# Patient Record
Sex: FEMALE | Race: WHITE
[De-identification: names, ages, dates, MRNs, and addresses within clinical notes are randomized per-mention and may not be internally consistent; named-entity substitution may affect disease eponyms.]

---

## 2020-01-14 ENCOUNTER — HOSPITAL ENCOUNTER (OUTPATIENT)
Dept: HOSPITAL 62 - OD | Age: 65
End: 2020-01-14
Attending: FAMILY MEDICINE
Payer: COMMERCIAL

## 2020-01-14 DIAGNOSIS — M79.673: Primary | ICD-10-CM

## 2020-01-14 DIAGNOSIS — Z79.899: ICD-10-CM

## 2020-01-14 LAB
ANION GAP SERPL CALC-SCNC: 9 MMOL/L (ref 5–19)
BUN SERPL-MCNC: 27 MG/DL (ref 7–20)
CALCIUM: 10.1 MG/DL (ref 8.4–10.2)
CHLORIDE SERPL-SCNC: 97 MMOL/L (ref 98–107)
CO2 SERPL-SCNC: 29 MMOL/L (ref 22–30)
GLUCOSE SERPL-MCNC: 86 MG/DL (ref 75–110)
POTASSIUM SERPL-SCNC: 4.5 MMOL/L (ref 3.6–5)

## 2020-01-14 PROCEDURE — 36415 COLL VENOUS BLD VENIPUNCTURE: CPT

## 2020-01-14 PROCEDURE — 80048 BASIC METABOLIC PNL TOTAL CA: CPT

## 2020-01-14 PROCEDURE — 84550 ASSAY OF BLOOD/URIC ACID: CPT

## 2020-05-19 ENCOUNTER — HOSPITAL ENCOUNTER (OUTPATIENT)
Dept: HOSPITAL 62 - OD | Age: 65
End: 2020-05-19
Attending: FAMILY MEDICINE
Payer: COMMERCIAL

## 2020-05-19 DIAGNOSIS — J02.9: Primary | ICD-10-CM

## 2020-05-19 DIAGNOSIS — Z79.899: ICD-10-CM

## 2020-05-19 PROCEDURE — 87880 STREP A ASSAY W/OPTIC: CPT

## 2020-05-19 PROCEDURE — 87070 CULTURE OTHR SPECIMN AEROBIC: CPT

## 2020-06-19 ENCOUNTER — HOSPITAL ENCOUNTER (OUTPATIENT)
Dept: HOSPITAL 62 - WI | Age: 65
End: 2020-06-19
Attending: FAMILY MEDICINE
Payer: COMMERCIAL

## 2020-06-19 DIAGNOSIS — Z12.31: Primary | ICD-10-CM

## 2020-06-19 PROCEDURE — 77067 SCR MAMMO BI INCL CAD: CPT

## 2020-06-19 NOTE — WOMENS IMAGING REPORT
EXAM DESCRIPTION:  BILAT SCREENING MAMMO W/CAD



IMAGES COMPLETED DATE/TIME:  6/19/2020 10:47 am



REASON FOR STUDY:  Z12.31 SCREENING MAMMO Z12.31  ENCNTR SCREEN MAMMOGRAM FOR MALIGNANT NEOPLASM OF B
RE



COMPARISON:  None.



EXAM PARAMETERS:  Standard craniocaudal and mediolateral oblique views of each breast recorded using 
digital acquisition.

Read with the assistance of CAD.

.Formerly [facility]  Version 9.2



LIMITATIONS:  None.



FINDINGS:  No suspicious masses, suspicious calcifications or architectural distortion. No areas of c
oncern.



IMPRESSION:  NEGATIVE MAMMOGRAM.  BIRADS 1



BREAST DENSITY:  d. The breasts are extremely dense, which lowers the sensitivity of mammography.



BIRAD:  ASSESSMENT:  1 NEGATIVE



RECOMMENDATION:  ROUTINE SCREENING



COMMENT:  The patient has been notified of the results by letter per MQSA requirements. Additional no
tification policies are in place for contacting patient with suspicious or incomplete findings.

Quality ID #225: The American College of Radiology recommends an annual screening mammogram for women
 aged 40 years or over. This facility utilizes a reminder system to ensure that all patients receive 
reminder letters, and/or direct phone calls for appointments. This includes reminders for routine scr
eening mammograms, diagnostic mammograms, or other Breast Imaging Interventions when appropriate.  Th
is patient will be placed in the appropriate reminder system.



TECHNICAL DOCUMENTATION:  FINDING NUMBER: (1)

ASSESSMENT: (1)

JOB ID:  [id number]

 2011 [employer]- All Rights Reserved



Reading location - IP/workstation name: [email or web address]

## 2020-07-27 ENCOUNTER — HOSPITAL ENCOUNTER (INPATIENT)
Dept: HOSPITAL 62 - ER | Age: 65
LOS: 8 days | Discharge: HOME | DRG: 178 | End: 2020-08-04
Attending: INTERNAL MEDICINE | Admitting: INTERNAL MEDICINE
Payer: COMMERCIAL

## 2020-07-27 DIAGNOSIS — E78.5: ICD-10-CM

## 2020-07-27 DIAGNOSIS — R05: ICD-10-CM

## 2020-07-27 DIAGNOSIS — E10.22: ICD-10-CM

## 2020-07-27 DIAGNOSIS — R74.0: ICD-10-CM

## 2020-07-27 DIAGNOSIS — N18.9: ICD-10-CM

## 2020-07-27 DIAGNOSIS — I48.0: ICD-10-CM

## 2020-07-27 DIAGNOSIS — Z87.891: ICD-10-CM

## 2020-07-27 DIAGNOSIS — Z79.82: ICD-10-CM

## 2020-07-27 DIAGNOSIS — Z82.49: ICD-10-CM

## 2020-07-27 DIAGNOSIS — Z79.4: ICD-10-CM

## 2020-07-27 DIAGNOSIS — R79.89: ICD-10-CM

## 2020-07-27 DIAGNOSIS — R50.9: ICD-10-CM

## 2020-07-27 DIAGNOSIS — Z83.3: ICD-10-CM

## 2020-07-27 DIAGNOSIS — Z88.2: ICD-10-CM

## 2020-07-27 DIAGNOSIS — U07.1: Primary | ICD-10-CM

## 2020-07-27 DIAGNOSIS — R06.02: ICD-10-CM

## 2020-07-27 DIAGNOSIS — Z90.49: ICD-10-CM

## 2020-07-27 DIAGNOSIS — F32.9: ICD-10-CM

## 2020-07-27 DIAGNOSIS — Z79.51: ICD-10-CM

## 2020-07-27 DIAGNOSIS — E87.1: ICD-10-CM

## 2020-07-27 DIAGNOSIS — R79.1: ICD-10-CM

## 2020-07-27 DIAGNOSIS — J44.9: ICD-10-CM

## 2020-07-27 DIAGNOSIS — Z79.890: ICD-10-CM

## 2020-07-27 DIAGNOSIS — Z95.5: ICD-10-CM

## 2020-07-27 DIAGNOSIS — I25.10: ICD-10-CM

## 2020-07-27 DIAGNOSIS — I12.9: ICD-10-CM

## 2020-07-27 DIAGNOSIS — E89.0: ICD-10-CM

## 2020-07-27 LAB
ABSOLUTE LYMPHOCYTES# (MANUAL): 0.5 10^3/UL (ref 0.5–4.7)
ABSOLUTE MONOCYTES # (MANUAL): 0.3 10^3/UL (ref 0.1–1.4)
ADD MANUAL DIFF: NO
ALBUMIN SERPL-MCNC: 4.4 G/DL (ref 3.5–5)
ALP SERPL-CCNC: 69 U/L (ref 38–126)
ANION GAP SERPL CALC-SCNC: 7 MMOL/L (ref 5–19)
ANISOCYTOSIS BLD QL SMEAR: SLIGHT
AST SERPL-CCNC: 84 U/L (ref 14–36)
BASOPHILS # BLD AUTO: 0 10^3/UL (ref 0–0.2)
BASOPHILS NFR BLD AUTO: 0.5 % (ref 0–2)
BASOPHILS NFR BLD MANUAL: 0 % (ref 0–2)
BILIRUB DIRECT SERPL-MCNC: 0 MG/DL (ref 0–0.4)
BILIRUB SERPL-MCNC: 0.6 MG/DL (ref 0.2–1.3)
BUN SERPL-MCNC: 14 MG/DL (ref 7–20)
BURR CELLS BLD QL SMEAR: SLIGHT
CALCIUM: 8.9 MG/DL (ref 8.4–10.2)
CHLORIDE SERPL-SCNC: 90 MMOL/L (ref 98–107)
CO2 SERPL-SCNC: 28 MMOL/L (ref 22–30)
CRP SERPL-MCNC: 87.1 MG/L (ref ?–10)
EOSINOPHIL # BLD AUTO: 0 10^3/UL (ref 0–0.6)
EOSINOPHIL NFR BLD AUTO: 0.9 % (ref 0–6)
EOSINOPHIL NFR BLD MANUAL: 0 % (ref 0–6)
ERYTHROCYTE [DISTWIDTH] IN BLOOD BY AUTOMATED COUNT: 14.5 % (ref 11.5–14)
ERYTHROCYTE [DISTWIDTH] IN BLOOD BY AUTOMATED COUNT: 14.9 % (ref 11.5–14)
FERRITIN SERPL-MCNC: 377 NG/ML (ref 11.1–264)
GLUCOSE SERPL-MCNC: 72 MG/DL (ref 75–110)
HCT VFR BLD CALC: 32.2 % (ref 36–47)
HCT VFR BLD CALC: 33.3 % (ref 36–47)
HGB BLD-MCNC: 11 G/DL (ref 12–15.5)
HGB BLD-MCNC: 11.2 G/DL (ref 12–15.5)
LYMPHOCYTES # BLD AUTO: 0.5 10^3/UL (ref 0.5–4.7)
LYMPHOCYTES NFR BLD AUTO: 9.4 % (ref 13–45)
MCH RBC QN AUTO: 28 PG (ref 27–33.4)
MCH RBC QN AUTO: 28.4 PG (ref 27–33.4)
MCHC RBC AUTO-ENTMCNC: 33.6 G/DL (ref 32–36)
MCHC RBC AUTO-ENTMCNC: 34.1 G/DL (ref 32–36)
MCV RBC AUTO: 83 FL (ref 80–97)
MCV RBC AUTO: 83 FL (ref 80–97)
MONOCYTES # BLD AUTO: 0.3 10^3/UL (ref 0.1–1.4)
MONOCYTES % (MANUAL): 5 % (ref 3–13)
MONOCYTES NFR BLD AUTO: 5 % (ref 3–13)
NEUTROPHILS # BLD AUTO: 4.6 10^3/UL (ref 1.7–8.2)
NEUTS SEG NFR BLD AUTO: 84.2 % (ref 42–78)
PLATELET # BLD: 141 10^3/UL (ref 150–450)
PLATELET # BLD: 146 10^3/UL (ref 150–450)
PLATELET COMMENT: (no result)
POTASSIUM SERPL-SCNC: 4.2 MMOL/L (ref 3.6–5)
PROT SERPL-MCNC: 7.9 G/DL (ref 6.3–8.2)
RBC # BLD AUTO: 3.88 10^6/UL (ref 3.72–5.28)
RBC # BLD AUTO: 3.99 10^6/UL (ref 3.72–5.28)
SEGMENTED NEUTROPHILS % (MAN): 86 % (ref 42–78)
TOTAL CELLS COUNTED % (AUTO): 100 %
TOTAL CELLS COUNTED BLD: 100
VARIANT LYMPHS NFR BLD MANUAL: 9 % (ref 13–45)
WBC # BLD AUTO: 5.4 10^3/UL (ref 4–10.5)
WBC # BLD AUTO: 5.8 10^3/UL (ref 4–10.5)
WBC TOXIC VACUOLES BLD QL SMEAR: PRESENT

## 2020-07-27 PROCEDURE — 87205 SMEAR GRAM STAIN: CPT

## 2020-07-27 PROCEDURE — 82962 GLUCOSE BLOOD TEST: CPT

## 2020-07-27 PROCEDURE — 96365 THER/PROPH/DIAG IV INF INIT: CPT

## 2020-07-27 PROCEDURE — 93306 TTE W/DOPPLER COMPLETE: CPT

## 2020-07-27 PROCEDURE — 84300 ASSAY OF URINE SODIUM: CPT

## 2020-07-27 PROCEDURE — 93010 ELECTROCARDIOGRAM REPORT: CPT

## 2020-07-27 PROCEDURE — 93312 ECHO TRANSESOPHAGEAL: CPT

## 2020-07-27 PROCEDURE — 94667 MNPJ CHEST WALL 1ST: CPT

## 2020-07-27 PROCEDURE — 80048 BASIC METABOLIC PNL TOTAL CA: CPT

## 2020-07-27 PROCEDURE — 83735 ASSAY OF MAGNESIUM: CPT

## 2020-07-27 PROCEDURE — 85027 COMPLETE CBC AUTOMATED: CPT

## 2020-07-27 PROCEDURE — 85025 COMPLETE CBC W/AUTO DIFF WBC: CPT

## 2020-07-27 PROCEDURE — 99285 EMERGENCY DEPT VISIT HI MDM: CPT

## 2020-07-27 PROCEDURE — 81001 URINALYSIS AUTO W/SCOPE: CPT

## 2020-07-27 PROCEDURE — 87077 CULTURE AEROBIC IDENTIFY: CPT

## 2020-07-27 PROCEDURE — 84100 ASSAY OF PHOSPHORUS: CPT

## 2020-07-27 PROCEDURE — 71260 CT THORAX DX C+: CPT

## 2020-07-27 PROCEDURE — 94799 UNLISTED PULMONARY SVC/PX: CPT

## 2020-07-27 PROCEDURE — 83036 HEMOGLOBIN GLYCOSYLATED A1C: CPT

## 2020-07-27 PROCEDURE — 87186 SC STD MICRODIL/AGAR DIL: CPT

## 2020-07-27 PROCEDURE — 93005 ELECTROCARDIOGRAM TRACING: CPT

## 2020-07-27 PROCEDURE — 93325 DOPPLER ECHO COLOR FLOW MAPG: CPT

## 2020-07-27 PROCEDURE — 71275 CT ANGIOGRAPHY CHEST: CPT

## 2020-07-27 PROCEDURE — 80061 LIPID PANEL: CPT

## 2020-07-27 PROCEDURE — 36415 COLL VENOUS BLD VENIPUNCTURE: CPT

## 2020-07-27 PROCEDURE — 87635 SARS-COV-2 COVID-19 AMP PRB: CPT

## 2020-07-27 PROCEDURE — 85379 FIBRIN DEGRADATION QUANT: CPT

## 2020-07-27 PROCEDURE — 71045 X-RAY EXAM CHEST 1 VIEW: CPT

## 2020-07-27 PROCEDURE — 87040 BLOOD CULTURE FOR BACTERIA: CPT

## 2020-07-27 PROCEDURE — C9803 HOPD COVID-19 SPEC COLLECT: HCPCS

## 2020-07-27 PROCEDURE — 86140 C-REACTIVE PROTEIN: CPT

## 2020-07-27 PROCEDURE — 84443 ASSAY THYROID STIM HORMONE: CPT

## 2020-07-27 PROCEDURE — 82728 ASSAY OF FERRITIN: CPT

## 2020-07-27 PROCEDURE — 87070 CULTURE OTHR SPECIMN AEROBIC: CPT

## 2020-07-27 PROCEDURE — 87150 DNA/RNA AMPLIFIED PROBE: CPT

## 2020-07-27 PROCEDURE — 83615 LACTATE (LD) (LDH) ENZYME: CPT

## 2020-07-27 PROCEDURE — 84481 FREE ASSAY (FT-3): CPT

## 2020-07-27 PROCEDURE — 84439 ASSAY OF FREE THYROXINE: CPT

## 2020-07-27 RX ADMIN — INSULIN HUMAN SCH: 100 INJECTION, SOLUTION PARENTERAL at 22:22

## 2020-07-27 RX ADMIN — Medication SCH ML: at 22:41

## 2020-07-27 RX ADMIN — MELATONIN SCH MG: 3 TAB ORAL at 22:16

## 2020-07-27 RX ADMIN — ENOXAPARIN SODIUM SCH MG: 80 INJECTION SUBCUTANEOUS at 22:28

## 2020-07-27 RX ADMIN — DEXAMETHASONE SODIUM PHOSPHATE SCH MG: 4 INJECTION, SOLUTION INTRAMUSCULAR; INTRAVENOUS at 22:15

## 2020-07-27 NOTE — RADIOLOGY REPORT (SQ)
EXAM DESCRIPTION:  CHEST SINGLE VIEW



IMAGES COMPLETED DATE/TIME:  7/27/2020 3:56 pm



REASON FOR STUDY:  SOB



COMPARISON:  11/25/2014



NUMBER OF VIEWS:  One view.



TECHNIQUE:  Single frontal radiographic view of the chest acquired.



LIMITATIONS:  None.



FINDINGS:  LUNGS AND PLEURA: No opacities, masses or pneumothorax. No pleural effusion. Attenuated bl
ood vessels and flattened kam-diaphragms.

MEDIASTINUM AND HILAR STRUCTURES: No masses.  Contour normal.

HEART AND VASCULAR STRUCTURES: Heart normal in size.  Normal vasculature.

BONES: No acute findings.

HARDWARE: None in the chest.

OTHER: No other significant finding.



IMPRESSION:  COPD.  NO ACUTE RADIOGRAPHIC FINDING IN THE CHEST.



TECHNICAL DOCUMENTATION:  JOB ID:  [id number]

 2011 [employer]- All Rights Reserved



Reading location - IP/workstation name: [email or web address]

## 2020-07-27 NOTE — RADIOLOGY REPORT (SQ)
EXAM DESCRIPTION: 



CTA chest with contrast



CLINICAL HISTORY: 



64 years Female, sob/cough



COMPARISON: 



None.



TECHNIQUE: 



Axial images of the chest were performed utilizing intravenous

contrast, with sagittal and coronal MIP images and sagittal and

coronal reformatted images.  



This exam was performed according to our departmental

dose-optimization program which includes use of Automated

Exposure Control, adjustment of the mA and/or kV according to

patient size and/or use of iterative reconstruction technique.





FINDINGS: 



There is mild emphysema.



No evidence of pulmonary embolus.



No evidence of aortic dissection.



No evidence of pulmonary infiltrate or pleural effusion.



No evidence of mediastinal or hilar adenopathy.





IMPRESSION: 



Mild emphysema.

## 2020-07-27 NOTE — ER DOCUMENT REPORT
ED General





- General


Chief Complaint: Shortness Of Breath


Stated Complaint: SHORTNESS OF BREATH


Time Seen by Provider: 07/27/20 14:59


Primary Care Provider: 


HALLEY HOLT MD [Primary Care Provider] - Follow up as needed


Mode of Arrival: Ambulatory


Information source: Patient


TRAVEL OUTSIDE OF THE [place] IN LAST 30 DAYS: No





- HPI


Notes: 





Patient comes in states she has had cough congestion for several days.  She 

states she is had fever just 1 time this morning.  It was subjective.  She has 

had some minor chest discomfort over the last 2 to 3 days.  It is intermittent 

and crampy.  It radiates across her chest.  It is worse with cough or movement 

and better with rest.  She has had some mild shortness of breath.  She has had 

mainly a dry cough.  She denies any known covert virus exposures.  She states 

she did have a negative covered test in an outlying facility several days ago.  

No vomiting or diarrhea.  She states she is a former smoker and does have a 

history of COPD.  She states she has not received any significant relief from 

using her inhalers at home.





- Related Data


Allergies/Adverse Reactions: 


                                        





Sulfa (Sulfonamide Antibiotics) Allergy (Verified 07/27/20 15:22)


   











Past Medical History





- General


Information source: Patient





- Social History


Smoking Status: Former Smoker


Frequency of alcohol use: Rare


Drug Abuse: None


Family History: Reviewed & Not Pertinent


Patient has homicidal ideation: No





Review of Systems





- Review of Systems


Constitutional: Chills, Fever, Malaise, Weakness


Cardiovascular: Chest pain.  denies: Palpitations


Respiratory: Cough, Short of breath


-: Yes All other systems reviewed and negative





Physical Exam





- Vital signs


Vitals: 


                                        











Temp Pulse Resp BP Pulse Ox


 


 98.8 F   63   16   119/67   99 


 


 07/27/20 14:06  07/27/20 14:06  07/27/20 14:06  07/27/20 14:06  07/27/20 14:06











Interpretation: Normal





- General


General appearance: Appears well, Alert





- HEENT


Head: Normocephalic, Atraumatic


Eyes: Normal


Pupils: PERRL





- Respiratory


Respiratory status: No respiratory distress


Chest status: Nontender


Breath sounds: Rhonchi - Bilateral


Chest palpation: Normal





- Cardiovascular


Rhythm: Regular


Heart sounds: Normal auscultation


Murmur: No





- Abdominal


Inspection: Normal


Distension: No distension


Bowel sounds: Normal


Tenderness: Nontender


Organomegaly: No organomegaly





- Back


Back: Normal, Nontender





- Extremities


General upper extremity: Normal inspection, Nontender, Normal color, Normal ROM,

Normal temperature


General lower extremity: Normal inspection, Nontender, Normal color, Normal ROM,

Normal temperature, Normal weight bearing.  No: Emily's sign





- Neurological


Neuro grossly intact: Yes


Cognition: Normal


Orientation: AAOx4


Conor Coma Scale Eye Opening: Spontaneous


Crucible Coma Scale Verbal: Oriented


Conor Coma Scale Motor: Obeys Commands


Crucible Coma Scale Total: 15


Speech: Normal


Motor strength normal: LUE, RUE, LLE, RLE


Sensory: Normal





- Psychological


Associated symptoms: Normal affect, Normal mood





- Skin


Skin Temperature: Warm


Skin Moisture: Dry


Skin Color: Normal





Course





- Re-evaluation


Re-evalutation: 





07/27/20 18:10


Patient presented with shortness of breath and worsening cough and subjective 

fevers.  Initial vital signs were not significantly remarkable laboratories were

only remarkable for a low sodium however patient was tell me that her sodium is 

always low.  When I did a set of repeat vitals for discharge patient's fever is 

now 103.  Patient is slightly tachypneic approximately 22 to 24 breaths/min.  

She is not tachycardic but she is on a beta-blocker.  Although she tested 

negative for COVID 2 days ago her symptoms seem somewhat suspicious of COVID.  

She has no known exposures.  In addition I am going to give her some fluids to 

help with her hyponatremia.  On review the last sodium here was in January of 

this year and it was normal.  A CT scan is also pending.  Patient's pneumonia 

scores, curb 65 and port are both borderline for admission.  Given the entire 

clinical picture of suspected COVID, increasing fever, saturations going from 

98% on room air to 94% on room air now and slightly increasing tachypnea as well

as with hyponatremia it seems prudent to have patient placed in the hospital for

further evaluation and treatment.





- Vital Signs


Vital signs: 


                                        











Temp Pulse Resp BP Pulse Ox


 


 98.8 F   63   16   119/67   99 


 


 07/27/20 14:06  07/27/20 14:06  07/27/20 14:06  07/27/20 14:06  07/27/20 14:06














- Laboratory


Result Diagrams: 


                                 07/27/20 16:25





                                 07/27/20 16:25


Laboratory results interpreted by me: 


                                        











  07/27/20 07/27/20





  16:25 16:25


 


Hgb   11.2 L


 


Hct   33.3 L


 


RDW   14.5 H


 


Plt Count   141 L


 


Sodium  125.3 L 


 


Chloride  90 L 


 


Glucose  72 L 


 


AST  84 H 


 


ALT  45 H 














- Diagnostic Test


Radiology reviewed: Image reviewed, Reports reviewed





Discharge





- Discharge


Clinical Impression: 


 Suspected COVID-19 virus infection, Hyponatremia





Fever


Qualifiers:


 Fever type: unspecified Qualified Code(s): R50.9 - Fever, unspecified





Condition: Serious


Disposition: ADMITTED AS INPATIENT


Admitting Provider: Millie (Hospitalist)


Unit Admitted: Medical Floor


Referrals: 


HALLEY HOLT MD [Primary Care Provider] - Follow up as needed

## 2020-07-27 NOTE — PDOC H&P
History of Present Illness


Admission Date/PCP: 


  07/27/20 19:30





  HALLEY HOLT MD





Patient complains of: Shortness of breath


History of Present Illness: 


NADEEN BARRIOS is a 64 year old female with a history of coronary artery 

disease, COPD, hyperlipidemia, depression, diabetes mellitus and hyponatremia 

presents with several weeks of increasing shortness of breath.  She states that 

she has been having a cough.  Lately it is productive of white sputum.  Until 

today she has been afebrile.  She spiked a fever 103.1 F.  In the emergency 

department her white blood cell count is normal at 5.8 but her differential is 

pending.  Her d-dimer is elevated at 2.84 but her ferritin, LDH and C-reactive 

protein are all pending.  Her serum sodium is 125.  Her glucose is only 72.  

With the increasing shortness of breath and fever as well as increased d-dimer 

the patient has a CT angiogram of the chest pending.  She states that she tested

negative for Covid-19 at a walk-in clinic several days ago.  She did state the 

result was negative however it has such a low sensitivity she has been tested 

again.  Her chest x-ray is fairly unremarkable but this can be part of an 

initial presentation of Covid-19.  She will be admitted to the hospitalist 

service.  Covid-19 serology is pending.  She will be placed on full-strength 

anticoagulation with her d-dimer above 1.0.  LDH, ferritin and C-reactive 

protein are pending.  She will be on a fluid restriction as her sodium is only 

125.








Past Medical History


Cardiac Medical History: Reports: Coronary Artery Disease - 1 stent, 

Hyperlipidema, Hypertension


Pulmonary Medical History: Reports: Chronic Obstructive Pulmonary Disease (COPD)


EENT Medical History: Reports: Cataracts


Neurological Medical History: 


   Denies: Ischemic CVA, Multiple Sclerosis


Endocrine Medical History: Reports: Diabetes Mellitus Type 1 - Per the patient.,

Hypothyroidism


Renal/ Medical History: Reports: Chronic Kidney Disease, Other - Patient 

reports chronic kidney disease despite normal renal function


Malignancy Medical History: 


   Denies: Breast Cancer, Colorectal Cancer, Liver Cancer, Lung Cancer


GI Medical History: 


   Denies: Cirrhosis, Gastroesophageal Reflux Disease, Hiatal Hernia


Musculoskeltal Medical History: 


   Denies: Fibromyalgia


Skin Medical History: 


   Denies: Eczema, Psoriasis


Psychiatric Medical History: Reports: Depression


Traumatic Medical History: Reports: None


Hematology: Reports: Anemia - Possible myelodysplasia





Past Surgical History


Past Surgical History: Reports: Appendectomy, Cardiac Catheterization, Coronary 

Stent, Thyroidectomy, Tubal Ligation





Social History


Information Source: Patient


Lives with: Spouse/Significant other


Smoking Status: Former Smoker


Electronic Cigarette use?: No


Frequency of Alcohol Use: Rare


Hx Recreational Drug Use: No


Hx Prescription Drug Abuse: No





- Advance Directive


Resuscitation Status: Full Code


Surrogate healthcare decision maker:: 











Family History


Family History: CAD, DM, Hypertension


Parental Family History Reviewed: Yes


Children Family History Reviewed: Yes


Sibling(s) Family History Reviewed.: Yes





Medication/Allergy


Allergies/Adverse Reactions: 


                                        





Sulfa (Sulfonamide Antibiotics) Allergy (Verified 07/27/20 15:22)


   











Review of Systems


All systems: reviewed and no additional remarkable complaints except as stated


Constitutional: PRESENT: fever(s)


Respiratory: PRESENT: cough, dyspnea, sputum


Psychiatric: PRESENT: depression


Allergic/Immunologic: PRESENT: seasonal rhinorrhea





Physical Exam


Vital Signs: 


                                        











Temp Pulse Resp BP Pulse Ox


 


 103.1 F H  82   22 H  132/96 H  94 


 


 07/27/20 17:58  07/27/20 17:58  07/27/20 17:58  07/27/20 17:58  07/27/20 17:58








                                 Intake & Output











 07/26/20 07/27/20 07/28/20





 06:59 06:59 06:59


 


Intake Total   50


 


Balance   50


 


Weight   79.832 kg











General appearance: PRESENT: cooperative, mild distress, well-developed.  

ABSENT: disheveled


Head exam: PRESENT: atraumatic, normocephalic


Eye exam: PRESENT: conjunctiva pink, EOMI.  ABSENT: scleral icterus


Ear exam: PRESENT: normal external ear exam.  ABSENT: bleeding, drainage


Mouth exam: PRESENT: moist, tongue midline


Neck exam: PRESENT: other - Incision scar from thyroidectomy.  ABSENT: carotid 

bruit, JVD, lymphadenopathy


Respiratory exam: PRESENT: clear to auscultation steven, symmetrical, tachypnea.  

ABSENT: accessory muscle use, chest wall tenderness, rales, rhonchi, wheezes


Cardiovascular exam: PRESENT: RRR, +S1, +S2.  ABSENT: bradycardia, diastolic 

murmur, irregular rhythm, systolic murmur, tachycardia


Pulses: PRESENT: normal radial pulses, normal dorsalis pedis pul


GI/Abdominal exam: PRESENT: normal bowel sounds, soft.  ABSENT: distended, 

guarding, tenderness


Rectal exam: PRESENT: deferred


Gentrourinary exam: ABSENT: indwelling catheter


Extremities exam: PRESENT: full ROM.  ABSENT: joint swelling, pedal edema


Musculoskeletal exam: PRESENT: ambulatory, normal inspection.  ABSENT: 

deformity, dislocation


Neurological exam: PRESENT: alert, awake, oriented to person, oriented to place,

oriented to time, oriented to situation, CN II-XII grossly intact.  ABSENT: 

altered, motor sensory deficit


Psychiatric exam: PRESENT: appropriate affect.  ABSENT: agitated, anxious


Focused psych exam: ABSENT: delusional, paranoid, restlessness


Skin exam: PRESENT: dry, normal color, warm.  ABSENT: rash, urticaria





Results


Laboratory Results: 


                                        





                                 07/27/20 16:25 





                                 07/27/20 16:25 





                                        











  07/27/20 07/27/20 07/27/20





  15:01 15:01 16:25


 


WBC  Cancelled  


 


RBC  Cancelled  


 


Hgb  Cancelled  


 


Hct  Cancelled  


 


MCV  Cancelled  


 


MCH  Cancelled  


 


MCHC  Cancelled  


 


RDW  Cancelled  


 


Plt Count  Cancelled  


 


Seg Neutrophils %  Cancelled  


 


Sodium   Cancelled  125.3 L


 


Potassium   Cancelled  4.2


 


Chloride   Cancelled  90 L


 


Carbon Dioxide   Cancelled  28


 


Anion Gap   Cancelled  7


 


BUN   Cancelled  14


 


Creatinine   Cancelled  0.86


 


Est GFR ( Amer)   Cancelled  > 60


 


Est GFR (Non-Af Amer)   Cancelled 


 


Glucose   Cancelled  72 L


 


Calcium   Cancelled  8.9


 


Total Bilirubin   Cancelled  0.6


 


AST   Cancelled  84 H


 


Alkaline Phosphatase   Cancelled  69


 


Total Protein   Cancelled  7.9


 


Albumin   Cancelled  4.4














  07/27/20





  16:25


 


WBC  5.8


 


RBC  3.99


 


Hgb  11.2 L


 


Hct  33.3 L


 


MCV  83


 


MCH  28.0


 


MCHC  33.6


 


RDW  14.5 H


 


Plt Count  141 L


 


Seg Neutrophils % 


 


Sodium 


 


Potassium 


 


Chloride 


 


Carbon Dioxide 


 


Anion Gap 


 


BUN 


 


Creatinine 


 


Est GFR ( Amer) 


 


Est GFR (Non-Af Amer) 


 


Glucose 


 


Calcium 


 


Total Bilirubin 


 


AST 


 


Alkaline Phosphatase 


 


Total Protein 


 


Albumin 











Impressions: 


                                        





Chest X-Ray  07/27/20 15:20


IMPRESSION:  COPD.  NO ACUTE RADIOGRAPHIC FINDING IN THE CHEST.


 














Assessment and Plan





- Diagnosis


(1) Suspected COVID-19 virus infection


Is this a current diagnosis for this admission?: Yes   


Plan: 


There are several reasons to suspect Covid-19 including her fever, shortness of 

breath with cough, elevated d-dimer and elevated AST and ALT.  CT angiogram of 

the chest is still pending.  C-reactive protein, ferritin and LDH are also 

pending.  We will treat her as a suspected positive patient with antibiotics, 

Decadron and supplements.








(2) Fever


Qualifiers: 


   Fever type: unspecified   Qualified Code(s): R50.9 - Fever, unspecified   


Is this a current diagnosis for this admission?: Yes   


Plan: 


Fever along with some of her symptoms and laboratory studies certainly create a 

high index of suspicion for Covid-19.  Acetaminophen for elevated temperature 

and antibiotics as well as other recommended COVID treatments until the serology

returns.  We will utilize an albuterol inhaler as opposed to nebulizers.  Oxygen

by nasal cannula if required.








(3) Elevated liver function tests


Is this a current diagnosis for this admission?: Yes   


Plan: 


She reports rare to occasional glasses of wine.  No other indication for 

elevated transaminases.  Transaminitis is 1 of the abnormalities seen in COVID. 

Continue to monitor.








(4) Elevated d-dimer


Is this a current diagnosis for this admission?: Yes   


Plan: 


Await results of the CT angiogram of the chest and abdomen.  Because there is a 

suspicion of Covid-19 and her d-dimer is greater than 1.0 she will be placed on 

full anticoagulant therapy with Lovenox 1 mg/kg every 12 hours.








(5) COPD (chronic obstructive pulmonary disease) with chronic bronchitis


Is this a current diagnosis for this admission?: Yes   


Plan: 


The patient reports COPD.  She states that she has not had an exacerbation in a 

long time.  She does have an albuterol rescue inhaler at home but has been not 

on any chronic daily inhaler therapy.  She does not use oxygen at home.








(6) Coronary artery disease


Qualifiers: 


   Coronary Disease-Associated Artery/Lesion type: native artery   Native vs. 

transplanted heart: native heart   Associated angina: without angina   Qualified

Code(s): I25.10 - Atherosclerotic heart disease of native coronary artery 

without angina pectoris   


Is this a current diagnosis for this admission?: Yes   


Plan: 


The patient states that she had a stent placed approximately 10 years ago or 

more.  She was following with cardiology but then decided to change 

practitioners.  She states she would be seeing Dr. Uribe at [facility] 

to establish with new cardiology.  We will continue aspirin, statin and her 

other cardiac medications once reconciled.  She will be on a cardiac diet.








(7) Hyponatremia


Is this a current diagnosis for this admission?: Yes   


Plan: 


The patient states that she has chronic kidney disease.  Her renal function is 

normal at this time.  I will place her on a fluid restriction and monitor the 

sodium.








(8) Hypertension


Qualifiers: 


   Hypertension type: essential hypertension   Qualified Code(s): I10 - 

Essential (primary) hypertension   


Is this a current diagnosis for this admission?: Yes   


Plan: 


The patient cannot remember all of her medications nor the doses.  He thinks she

is on lisinopril and bisoprolol.  Continue antihypertensive medications once 

they are reconciled.








(9) Hypothyroidism, postsurgical


Is this a current diagnosis for this admission?: Yes   


Plan: 


The patient states that there were lesions on her thyroid that were "premal

ignant".  She underwent thyroidectomy.  Continue levothyroxine.








(10) Depression


Qualifiers: 


   Depression Type: unspecified   Qualified Code(s): F32.9 - Major depressive 

disorder, single episode, unspecified   


Is this a current diagnosis for this admission?: Yes   


Plan: 


Continue antidepressant.  I believe it was citalopram.  The patient cannot 

remember.  Await medication reconciliation.








(11) Diabetes mellitus


Qualifiers: 


   Diabetes mellitus type: type 1   Diabetes mellitus complication status: 

without complication   Qualified Code(s): E10.9 - Type 1 diabetes mellitus 

without complications   


Is this a current diagnosis for this admission?: Yes   


Plan: 


The patient reports type 1 diabetes mellitus however she mentions no insulin on 

her medication list.  She could not remember all of her medications.  Await 

medication reconciliation from pharmacy.  She will be on a controlled 

carbohydrate diet with Accu-Cheks before meals and bedtime as well as a regular 

insulin sliding scale.








- Time


Time Spent with patient: 35 or more minutes


Medications reviewed and adjusted accordingly: Yes


Anticipated Discharge Disposition: Home, Self Care


Anticipated Discharge: Other - Unknown at this time.  Pending results of Prim Laundryi

gations.





- Inpatient Certification


Based on my medical assessment, after consideration of the patient's 

comorbidities, presenting symptoms, or acuity I expect that the services needed 

warrant INPATIENT care.: Yes


I certify that my determination is in accordance with my understanding of 

Medicare's requirements for reasonable and necessary INPATIENT services [42 CFR 

412.3e].: Yes


Medical Necessity: Failure to Improve With Outpatient Therapy, Significant 

Comorbidiites Make Outpatient Treatment Too Risky, Need Close Monitoring Due to 

Risk of Patient Decompensation, Need For Continuous Telemetry Monitoring, Need 

for IV Antibiotics


Post Hospital Care: D/C Planner Documentation

## 2020-07-28 LAB
ADD MANUAL DIFF: NO
ANION GAP SERPL CALC-SCNC: 10 MMOL/L (ref 5–19)
APPEARANCE UR: CLEAR
APTT PPP: YELLOW S
BASOPHILS # BLD AUTO: 0 10^3/UL (ref 0–0.2)
BASOPHILS NFR BLD AUTO: 0.4 % (ref 0–2)
BILIRUB UR QL STRIP: NEGATIVE
BUN SERPL-MCNC: 14 MG/DL (ref 7–20)
CALCIUM: 8 MG/DL (ref 8.4–10.2)
CHLORIDE SERPL-SCNC: 91 MMOL/L (ref 98–107)
CHOLEST SERPL-MCNC: 122.99 MG/DL (ref 0–200)
CO2 SERPL-SCNC: 22 MMOL/L (ref 22–30)
CRP SERPL-MCNC: 150.4 MG/L (ref ?–10)
EOSINOPHIL # BLD AUTO: 0 10^3/UL (ref 0–0.6)
EOSINOPHIL NFR BLD AUTO: 0.2 % (ref 0–6)
ERYTHROCYTE [DISTWIDTH] IN BLOOD BY AUTOMATED COUNT: 14.6 % (ref 11.5–14)
FREE T4 (FREE THYROXINE): 1.45 NG/DL (ref 0.78–2.19)
GLUCOSE SERPL-MCNC: 328 MG/DL (ref 75–110)
GLUCOSE UR STRIP-MCNC: >=500 MG/DL
HCT VFR BLD CALC: 27.4 % (ref 36–47)
HGB BLD-MCNC: 9.7 G/DL (ref 12–15.5)
KETONES UR STRIP-MCNC: NEGATIVE MG/DL
LDLC SERPL DIRECT ASSAY-MCNC: 46 MG/DL (ref ?–100)
LYMPHOCYTES # BLD AUTO: 0.4 10^3/UL (ref 0.5–4.7)
LYMPHOCYTES NFR BLD AUTO: 8.5 % (ref 13–45)
MCH RBC QN AUTO: 28.9 PG (ref 27–33.4)
MCHC RBC AUTO-ENTMCNC: 35.3 G/DL (ref 32–36)
MCV RBC AUTO: 82 FL (ref 80–97)
MONOCYTES # BLD AUTO: 0.2 10^3/UL (ref 0.1–1.4)
MONOCYTES NFR BLD AUTO: 4.9 % (ref 3–13)
NEUTROPHILS # BLD AUTO: 3.9 10^3/UL (ref 1.7–8.2)
NEUTS SEG NFR BLD AUTO: 86 % (ref 42–78)
PH UR STRIP: 6 [PH] (ref 5–9)
PHOSPHATE SERPL-MCNC: 3.2 MG/DL (ref 2.5–4.5)
PLATELET # BLD: 112 10^3/UL (ref 150–450)
POTASSIUM SERPL-SCNC: 4 MMOL/L (ref 3.6–5)
PROT UR STRIP-MCNC: 100 MG/DL
RBC # BLD AUTO: 3.35 10^6/UL (ref 3.72–5.28)
SP GR UR STRIP: 1.02
T3FREE SERPL-MCNC: 1.81 PG/ML (ref 2.77–5.27)
TOTAL CELLS COUNTED % (AUTO): 100 %
TRIGL SERPL-MCNC: 105 MG/DL (ref ?–150)
UROBILINOGEN UR-MCNC: NEGATIVE MG/DL (ref ?–2)
VLDLC SERPL CALC-MCNC: 21 MG/DL (ref 10–31)
WBC # BLD AUTO: 4.5 10^3/UL (ref 4–10.5)

## 2020-07-28 RX ADMIN — Medication SCH TAB: at 22:26

## 2020-07-28 RX ADMIN — Medication SCH ML: at 13:46

## 2020-07-28 RX ADMIN — DEXAMETHASONE SODIUM PHOSPHATE SCH MG: 4 INJECTION, SOLUTION INTRAMUSCULAR; INTRAVENOUS at 13:46

## 2020-07-28 RX ADMIN — OXYCODONE HYDROCHLORIDE AND ACETAMINOPHEN SCH MG: 500 TABLET ORAL at 09:14

## 2020-07-28 RX ADMIN — ATORVASTATIN CALCIUM SCH MG: 20 TABLET, FILM COATED ORAL at 22:26

## 2020-07-28 RX ADMIN — VITAMIN D, TAB 1000IU (100/BT) SCH UNIT: 25 TAB at 09:16

## 2020-07-28 RX ADMIN — Medication SCH: at 22:36

## 2020-07-28 RX ADMIN — DEXAMETHASONE SODIUM PHOSPHATE SCH MG: 4 INJECTION, SOLUTION INTRAMUSCULAR; INTRAVENOUS at 22:26

## 2020-07-28 RX ADMIN — INSULIN HUMAN SCH UNIT: 100 INJECTION, SOLUTION PARENTERAL at 09:12

## 2020-07-28 RX ADMIN — OXYCODONE HYDROCHLORIDE AND ACETAMINOPHEN SCH MG: 500 TABLET ORAL at 17:08

## 2020-07-28 RX ADMIN — INSULIN HUMAN SCH: 100 INJECTION, SOLUTION PARENTERAL at 11:37

## 2020-07-28 RX ADMIN — AZITHROMYCIN SCH MG: 250 TABLET, FILM COATED ORAL at 17:08

## 2020-07-28 RX ADMIN — ENOXAPARIN SODIUM SCH: 80 INJECTION SUBCUTANEOUS at 22:26

## 2020-07-28 RX ADMIN — INSULIN LISPRO SCH UNIT: 100 INJECTION, SOLUTION INTRAVENOUS; SUBCUTANEOUS at 17:07

## 2020-07-28 RX ADMIN — INSULIN LISPRO SCH: 100 INJECTION, SOLUTION INTRAVENOUS; SUBCUTANEOUS at 22:21

## 2020-07-28 RX ADMIN — CEFTRIAXONE SCH MLS/HR: 1 INJECTION, SOLUTION INTRAVENOUS at 10:08

## 2020-07-28 RX ADMIN — MELATONIN SCH MG: 3 TAB ORAL at 22:26

## 2020-07-28 RX ADMIN — DEXAMETHASONE SODIUM PHOSPHATE SCH MG: 4 INJECTION, SOLUTION INTRAMUSCULAR; INTRAVENOUS at 06:10

## 2020-07-28 RX ADMIN — Medication SCH MG: at 09:14

## 2020-07-28 RX ADMIN — Medication SCH ML: at 06:16

## 2020-07-28 RX ADMIN — ENOXAPARIN SODIUM SCH: 80 INJECTION SUBCUTANEOUS at 09:06

## 2020-07-28 NOTE — PDOC PROGRESS REPORT
Subjective


Progress Note for:: 07/28/20


Subjective:: 


Patient was seen on afternoon rounds.  She is found sitting up to the recliner, 

comfortably, on room air.  She is speaking full sentences without increased work

of breathing.  She reports that her dyspnea is resolved.  She does continue to 

have a productive cough and generalized fatigue.


Otherwise, she reports that she is feeling well and has no new questions or con

cerns.


She denies fever, chills, chest pain, palpitations, Abdominal pain, nausea, 

vomiting, and diarrhea.


No concerns per nursing.


Reason For Visit: 


COPD EXACERBATION,CORONARY ARTERY DISEASE,HYPERTEN








Physical Exam


Vital Signs: 


                                        











Temp Pulse Resp BP Pulse Ox


 


 98.1 F   70   20   137/42 H  98 


 


 07/28/20 16:02  07/28/20 16:02  07/28/20 16:02  07/28/20 16:02  07/28/20 16:02








                                 Intake & Output











 07/27/20 07/28/20 07/29/20





 06:59 06:59 06:59


 


Intake Total  1050 530


 


Output Total   200


 


Balance  1050 330


 


Weight  79.832 kg 











General appearance: PRESENT: no acute distress, cooperative, well-developed, 

well-nourished - overweight


Head exam: PRESENT: atraumatic, normocephalic


Eye exam: PRESENT: conjunctiva pink, EOMI, PERRLA.  ABSENT: scleral icterus


Mouth exam: PRESENT: moist, tongue midline


Respiratory exam: PRESENT: clear to auscultation steven, rhonchi, symmetrical, 

unlabored.  ABSENT: rales, wheezes


Cardiovascular exam: PRESENT: RRR.  ABSENT: diastolic murmur, rubs, systolic 

murmur


Vascular exam: PRESENT: normal capillary refill


Extremities exam: PRESENT: full ROM.  ABSENT: calf tenderness, clubbing, pedal 

edema


Musculoskeletal exam: PRESENT: ambulatory


Neurological exam: PRESENT: alert, awake, oriented to person, oriented to place,

oriented to time, oriented to situation, CN II-XII grossly intact.  ABSENT: 

motor sensory deficit


Psychiatric exam: PRESENT: appropriate affect, normal mood.  ABSENT: homicidal 

ideation, suicidal ideation


Skin exam: PRESENT: dry, intact, warm.  ABSENT: cyanosis, rash





Results


Laboratory Results: 


                                        





                                 07/28/20 05:06 





                                 07/28/20 05:06 





                                        











  07/27/20 07/27/20 07/28/20





  15:01 16:25 05:06


 


WBC  5.4  5.8  4.5


 


RBC  3.88  3.99  3.35 L


 


Hgb  11.0 L  11.2 L  9.7 L


 


Hct  32.2 L  33.3 L  27.4 L


 


MCV  83  83  82


 


MCH  28.4  28.0  28.9


 


MCHC  34.1  33.6  35.3


 


RDW  14.9 H  14.5 H  14.6 H


 


Plt Count  146 L  141 L  112 L


 


Seg Neutrophils %  84.2 H   86.0 H


 


Sodium   


 


Potassium   


 


Chloride   


 


Carbon Dioxide   


 


Anion Gap   


 


BUN   


 


Creatinine   


 


Est GFR (African Amer)   


 


Glucose   


 


Calcium   


 


Phosphorus   


 


Magnesium   


 


C-Reactive Protein   


 


Triglycerides   


 


Cholesterol   


 


LDL Cholesterol Direct   


 


VLDL Cholesterol   


 


HDL Cholesterol   


 


TSH   


 


Free T4   


 


Free T3 pg/mL   














  07/28/20 07/28/20 07/28/20





  05:06 05:06 05:06


 


WBC   


 


RBC   


 


Hgb   


 


Hct   


 


MCV   


 


MCH   


 


MCHC   


 


RDW   


 


Plt Count   


 


Seg Neutrophils %   


 


Sodium  122.5 L  


 


Potassium  4.0  


 


Chloride  91 L  


 


Carbon Dioxide  22  


 


Anion Gap  10  


 


BUN  14  


 


Creatinine  0.74  


 


Est GFR ( Amer)  > 60  


 


Glucose  328 H  


 


Calcium  8.0 L  


 


Phosphorus  3.2  


 


Magnesium  1.5 L  


 


C-Reactive Protein  150.4 H  


 


Triglycerides  105  


 


Cholesterol  122.99  


 


LDL Cholesterol Direct  46  


 


VLDL Cholesterol  21.0  


 


HDL Cholesterol  52  


 


TSH   0.13 L 


 


Free T4    1.45


 


Free T3 pg/mL    1.81 L











Impressions: 


                                        





Chest X-Ray  07/27/20 15:20


IMPRESSION:  COPD.  NO ACUTE RADIOGRAPHIC FINDING IN THE CHEST.


 








Chest/Abdomen CTA  07/27/20 18:00


IMPRESSION: 


 


Mild emphysema.


 














Assessment and Plan





- Diagnosis


(1) Suspected COVID-19 virus infection


Is this a current diagnosis for this admission?: Yes   


Plan: 


Rapid COVID-19 test yesterday is negative.


Continue to have a high suspicion for COVID-19 virus infection  due to the 

patient's fever and symptoms of dyspnea/cough without clear source of COPD 

exacerbation or pneumonia to attribute the symptoms to.


She also has numerous laboratory results supportive of COVID-19; elevated d-

dimer, transaminases, CRP, LDH, ferritin.


CTA negative for pulmonary embolus and pneumonia.


COPD exacerbation is not suspected (no wheezing on exam) and certainly would not

be because of the above-mentioned laboratory changes.





Patient is admitted to medical floor and continued cardiac telemetry.


Due to elevated d-dimer, she is placed on full dose Lovenox.


She is empirically placed on IV azithromycin and ceftriaxone.


Start Decadron 4 mg IV twice daily.


Continue vitamin C, vitamin D, zinc, and melatonin supplementation.


Provide supplemental oxygen as needed to maintain saturations greater than 89%.


Encourage pulmonary toilet.


Contact and droplet precautions.


Send out testing pending.  Discussed this with both nursing supervisor and 

infectious control today; patient meets clinical criteria for COVID-19 despite 

her prior negative testing.








(2) COPD (chronic obstructive pulmonary disease) with chronic bronchitis


Is this a current diagnosis for this admission?: Yes   


Plan: 


The patient reports COPD.  She states that she has not had an exacerbation in a 

long time.  She does have an albuterol rescue inhaler at home but has been not 

on any chronic daily inhaler therapy.  She does not use oxygen at home.





Currently on azithromycin and dexamethasone secondary to suspected COVID.


No wheezing noted on exam today.


Encourage pulmonary toilet with incentive spirometer, flutter valve.











(3) Coronary artery disease


Qualifiers: 


   Coronary Disease-Associated Artery/Lesion type: native artery   Native vs. 

transplanted heart: native heart   Associated angina: without angina   Qualified

Code(s): I25.10 - Atherosclerotic heart disease of native coronary artery 

without angina pectoris   


Is this a current diagnosis for this admission?: Yes   


Plan: 


The patient states that she had a stent placed approximately 10 years ago or 

more.  She was following with cardiology but then decided to change 

practitioners.  She states she would be seeing Dr. Uribe at [facility] 

to establish with new cardiology. 





Resume aspirin and statin therapy.


Resume a home antihypertensive regiment of amlodipine, lisinopril/HCTZ, 

bisprolol, and hydralazine.


Cardiac diet.








(4) Depression


Qualifiers: 


   Depression Type: unspecified   Qualified Code(s): F32.9 - Major depressive 

disorder, single episode, unspecified   


Is this a current diagnosis for this admission?: Yes   


Plan: 


Home dose Celexa.


Monitor QTC while given concurrently with azithromycin.








(5) Diabetes mellitus


Qualifiers: 


   Diabetes mellitus type: type 1   Diabetes mellitus complication status: 

without complication   Qualified Code(s): E10.9 - Type 1 diabetes mellitus 

without complications   


Is this a current diagnosis for this admission?: Yes   


Plan: 


A1c 6.4%.


Resume home dose NPH.


Accu-Cheks before meals and at bedtime with Humalog per sliding scale coverage.


Hypoglycemia protocol.


Cardiac/consistent carb diet.








(6) Elevated d-dimer


Is this a current diagnosis for this admission?: Yes   


Plan: 


CTA chest negative for pulmonary embolus.





Because there is a suspicion of Covid-19 and her d-dimer is greater than 1.0 she

will be placed on full anticoagulant therapy with Lovenox 1 mg/kg every 12 ho

urs.


Follow-up d-dimer.








(7) Elevated liver function tests


Is this a current diagnosis for this admission?: Yes   


Plan: 


Resolved.


She reports rare to occasional glasses of wine.  No other indication for 

elevated transaminases.  





Transaminitis is 1 of the abnormalities seen in COVID.  


Continue to monitor.








(8) Fever


Qualifiers: 


   Fever type: unspecified   Qualified Code(s): R50.9 - Fever, unspecified   


Is this a current diagnosis for this admission?: Yes   


Plan: 


Fever along with some of her symptoms and laboratory studies certainly create a 

high index of suspicion for Covid-19. 


T-max 103.1/24 hours.





Acetaminophen for elevated temperature and antibiotics as well as other 

recommended COVID treatments until the serology returns


Blood cultures (1/4 bottles) shows gram-positive cocci.


Continue IV azithromycin and ceftriaxone.


Repeat blood cultures in the morning.








(9) Hypertension


Qualifiers: 


   Hypertension type: essential hypertension   Qualified Code(s): I10 - 

Essential (primary) hypertension   


Is this a current diagnosis for this admission?: Yes   


Plan: 


Resume a home antihypertensive regiment of amlodipine, lisinopril/HCTZ, 

bisprolol, and hydralazine.


Cardiac diet.








(10) Hyponatremia


Is this a current diagnosis for this admission?: Yes   


Plan: 


Chronic per patient, however, labs from January 2020 show sodium 134.8; 

therefore, her hyponatremia today is significantly lower than what is presumed 

to be her baseline.


Sodium trending down.





Liberalize dietary sodium.


Fluid restrict 1.2 L/daily


Follow up chemistry.








(11) Hypothyroidism, postsurgical


Is this a current diagnosis for this admission?: Yes   


Plan: 


Continue home dose levothyroxine.








- Time


Time Spent with patient: 35 or more minutes


Medications reviewed and adjusted accordingly: Yes


Anticipated Discharge Disposition: Home, Self Care


Anticipated Discharge: within 48 hours

## 2020-07-29 LAB
ANION GAP SERPL CALC-SCNC: 9 MMOL/L (ref 5–19)
BUN SERPL-MCNC: 29 MG/DL (ref 7–20)
CALCIUM: 9 MG/DL (ref 8.4–10.2)
CHLORIDE SERPL-SCNC: 93 MMOL/L (ref 98–107)
CO2 SERPL-SCNC: 23 MMOL/L (ref 22–30)
CRP SERPL-MCNC: 132.7 MG/L (ref ?–10)
ERYTHROCYTE [DISTWIDTH] IN BLOOD BY AUTOMATED COUNT: 14.7 % (ref 11.5–14)
GLUCOSE SERPL-MCNC: 325 MG/DL (ref 75–110)
HCT VFR BLD CALC: 26 % (ref 36–47)
HGB BLD-MCNC: 9.2 G/DL (ref 12–15.5)
MCH RBC QN AUTO: 29 PG (ref 27–33.4)
MCHC RBC AUTO-ENTMCNC: 35.3 G/DL (ref 32–36)
MCV RBC AUTO: 82 FL (ref 80–97)
PLATELET # BLD: 126 10^3/UL (ref 150–450)
POTASSIUM SERPL-SCNC: 4.7 MMOL/L (ref 3.6–5)
RBC # BLD AUTO: 3.16 10^6/UL (ref 3.72–5.28)
WBC # BLD AUTO: 5.7 10^3/UL (ref 4–10.5)

## 2020-07-29 RX ADMIN — INSULIN LISPRO SCH UNIT: 100 INJECTION, SOLUTION INTRAVENOUS; SUBCUTANEOUS at 21:42

## 2020-07-29 RX ADMIN — AMLODIPINE BESYLATE SCH MG: 10 TABLET ORAL at 10:15

## 2020-07-29 RX ADMIN — INSULIN HUMAN SCH UNIT: 100 INJECTION, SUSPENSION SUBCUTANEOUS at 21:41

## 2020-07-29 RX ADMIN — HYDROCHLOROTHIAZIDE SCH MG: 12.5 CAPSULE ORAL at 10:17

## 2020-07-29 RX ADMIN — HYDROCHLOROTHIAZIDE SCH: 12.5 CAPSULE ORAL at 10:33

## 2020-07-29 RX ADMIN — HYDRALAZINE HYDROCHLORIDE SCH MG: 25 TABLET, FILM COATED ORAL at 21:39

## 2020-07-29 RX ADMIN — OXYCODONE HYDROCHLORIDE AND ACETAMINOPHEN SCH MG: 500 TABLET ORAL at 10:17

## 2020-07-29 RX ADMIN — ATORVASTATIN CALCIUM SCH MG: 20 TABLET, FILM COATED ORAL at 21:39

## 2020-07-29 RX ADMIN — LEVOTHYROXINE SODIUM SCH MG: 75 TABLET ORAL at 08:33

## 2020-07-29 RX ADMIN — LISINOPRIL SCH MG: 10 TABLET ORAL at 10:15

## 2020-07-29 RX ADMIN — INSULIN LISPRO SCH: 100 INJECTION, SOLUTION INTRAVENOUS; SUBCUTANEOUS at 13:34

## 2020-07-29 RX ADMIN — DEXAMETHASONE SODIUM PHOSPHATE SCH MG: 4 INJECTION, SOLUTION INTRAMUSCULAR; INTRAVENOUS at 13:35

## 2020-07-29 RX ADMIN — PANTOPRAZOLE SODIUM SCH MG: 40 TABLET, DELAYED RELEASE ORAL at 17:26

## 2020-07-29 RX ADMIN — ENOXAPARIN SODIUM SCH MG: 80 INJECTION SUBCUTANEOUS at 10:18

## 2020-07-29 RX ADMIN — OXYCODONE HYDROCHLORIDE AND ACETAMINOPHEN SCH MG: 500 TABLET ORAL at 17:27

## 2020-07-29 RX ADMIN — ENOXAPARIN SODIUM SCH: 80 INJECTION SUBCUTANEOUS at 10:32

## 2020-07-29 RX ADMIN — DEXAMETHASONE SODIUM PHOSPHATE SCH MG: 4 INJECTION, SOLUTION INTRAMUSCULAR; INTRAVENOUS at 05:47

## 2020-07-29 RX ADMIN — Medication SCH: at 21:43

## 2020-07-29 RX ADMIN — FOLIC ACID SCH MG: 1 TABLET ORAL at 10:17

## 2020-07-29 RX ADMIN — ENOXAPARIN SODIUM SCH MG: 80 INJECTION SUBCUTANEOUS at 21:40

## 2020-07-29 RX ADMIN — DEXAMETHASONE SODIUM PHOSPHATE SCH MG: 4 INJECTION, SOLUTION INTRAMUSCULAR; INTRAVENOUS at 21:43

## 2020-07-29 RX ADMIN — CEFTRIAXONE SCH MLS/HR: 1 INJECTION, SOLUTION INTRAVENOUS at 10:19

## 2020-07-29 RX ADMIN — VITAMIN D, TAB 1000IU (100/BT) SCH UNIT: 25 TAB at 10:16

## 2020-07-29 RX ADMIN — Medication SCH ML: at 13:42

## 2020-07-29 RX ADMIN — INSULIN HUMAN SCH UNIT: 100 INJECTION, SUSPENSION SUBCUTANEOUS at 10:20

## 2020-07-29 RX ADMIN — CITALOPRAM HYDROBROMIDE SCH MG: 20 TABLET ORAL at 10:18

## 2020-07-29 RX ADMIN — INSULIN LISPRO SCH UNIT: 100 INJECTION, SOLUTION INTRAVENOUS; SUBCUTANEOUS at 17:29

## 2020-07-29 RX ADMIN — Medication SCH TAB: at 21:39

## 2020-07-29 RX ADMIN — MULTIVITAMIN TABLET SCH TAB: TABLET at 10:17

## 2020-07-29 RX ADMIN — Medication SCH MG: at 10:16

## 2020-07-29 RX ADMIN — ASPIRIN SCH MG: 81 TABLET, COATED ORAL at 10:15

## 2020-07-29 RX ADMIN — Medication SCH ML: at 05:47

## 2020-07-29 RX ADMIN — HYDRALAZINE HYDROCHLORIDE SCH MG: 25 TABLET, FILM COATED ORAL at 10:17

## 2020-07-29 RX ADMIN — AZITHROMYCIN SCH MG: 250 TABLET, FILM COATED ORAL at 17:27

## 2020-07-29 RX ADMIN — MELATONIN SCH MG: 3 TAB ORAL at 21:39

## 2020-07-29 RX ADMIN — ATENOLOL SCH: 50 TABLET ORAL at 10:32

## 2020-07-29 RX ADMIN — INSULIN LISPRO SCH UNIT: 100 INJECTION, SOLUTION INTRAVENOUS; SUBCUTANEOUS at 08:47

## 2020-07-29 NOTE — EKG REPORT
SEVERITY:- ABNORMAL ECG -

ATRIAL FIBRILLATION

LOW VOLTAGE IN FRONTAL LEADS

BORDERLINE R WAVE PROGRESSION, ANTERIOR LEADS

BORDERLINE PROLONGED QT INTERVAL

:

Confirmed by: Mimi Wright MD 29-Jul-2020 08:38:51

## 2020-07-29 NOTE — PDOC PROGRESS REPORT
Subjective


Progress Note for:: 07/29/20


Subjective:: 


Patient was seen on afternoon rounds.  She is found ambulating in her room, 

comfortably, on room air.  She is speaking full sentences without increased work

of breathing.  She reports that her dyspnea is resolved.  She does continue to 

have a productive cough and generalized fatigue.  Notes that her dyspnea and 

wheezing is worse at night; frequently wakes her up.  Denies a prior diagnosis 

of reflux.


Otherwise, she reports that she is feeling well and has no new questions or 

concerns.


Frustrated by steroids; feels that this caused her to be quite emotional.  She 

is also frustrated that this has caused her glucose to be elevated.  Would like 

to discharge home soon as possible.


She denies fever, chills, chest pain, palpitations, Abdominal pain, nausea, 

vomiting, and diarrhea.


No concerns per nursing.


Reason For Visit: 


COPD EXACERBATION,CORONARY ARTERY DISEASE,HYPERTEN








Physical Exam


Vital Signs: 


                                        











Temp Pulse Resp BP Pulse Ox


 


 97.3 F   77   24 H  119/82   97 


 


 07/29/20 12:13  07/29/20 12:13  07/29/20 12:13  07/29/20 12:13  07/29/20 12:13








                                 Intake & Output











 07/28/20 07/29/20 07/30/20





 06:59 06:59 06:59


 


Intake Total 1050 530 240


 


Output Total  200 


 


Balance 1050 330 240


 


Weight 79.832 kg 91.4 kg 











General appearance: PRESENT: no acute distress, cooperative, well-developed, 

well-nourished - Overweight


Head exam: PRESENT: atraumatic, normocephalic


Eye exam: PRESENT: conjunctiva pink, EOMI, PERRLA.  ABSENT: scleral icterus


Mouth exam: PRESENT: moist, tongue midline


Respiratory exam: PRESENT: clear to auscultation steven, symmetrical, unlabored.  

ABSENT: rales, rhonchi, wheezes


Cardiovascular exam: PRESENT: RRR.  ABSENT: diastolic murmur, rubs, systolic 

murmur


Vascular exam: PRESENT: normal capillary refill


Extremities exam: PRESENT: full ROM.  ABSENT: calf tenderness, clubbing, pedal 

edema


Musculoskeletal exam: PRESENT: ambulatory


Neurological exam: PRESENT: alert, awake, oriented to person, oriented to place,

oriented to time, oriented to situation, CN II-XII grossly intact.  ABSENT: 

motor sensory deficit


Psychiatric exam: PRESENT: appropriate affect.  ABSENT: homicidal ideation, 

normal mood - Labile emotions, suicidal ideation


Skin exam: PRESENT: dry, intact, warm.  ABSENT: cyanosis, rash





Results


Laboratory Results: 


                                        





                                 07/29/20 05:14 





                                 07/29/20 05:14 





                                        











  07/28/20 07/29/20 07/29/20





  20:55 05:14 05:14


 


WBC    5.7


 


RBC    3.16 L


 


Hgb    9.2 L


 


Hct    26.0 L


 


MCV    82


 


MCH    29.0


 


MCHC    35.3


 


RDW    14.7 H


 


Plt Count    126 L


 


Sodium   125.1 L 


 


Potassium   4.7 


 


Chloride   93 L 


 


Carbon Dioxide   23 


 


Anion Gap   9 


 


BUN   29 H 


 


Creatinine   1.00 


 


Est GFR ( Amer)   > 60 


 


Glucose   325 H 


 


Calcium   9.0 


 


Ferritin   581.00 H 


 


C-Reactive Protein   132.7 H 


 


Urine Color  YELLOW  


 


Urine Appearance  CLEAR  


 


Urine pH  6.0  


 


Ur Specific Gravity  1.024  


 


Urine Protein  100 H  


 


Urine Glucose (UA)  >=500 H  


 


Urine Ketones  NEGATIVE  


 


Urine Blood  NEGATIVE  


 


Urine RBC (Auto)  1  








                                        





07/27/20 18:25   Blood   Blood Culture (PCR) - Final


                            Staphylococcus Species


07/28/20 20:55   Sputum   Gram Stain - Final


07/28/20 20:55   Sputum   Sputum Culture - Final








Impressions: 


                                        





Chest X-Ray  07/27/20 15:20


IMPRESSION:  COPD.  NO ACUTE RADIOGRAPHIC FINDING IN THE CHEST.


 








Chest/Abdomen CTA  07/27/20 18:00


IMPRESSION: 


 


Mild emphysema.


 














Assessment and Plan





- Diagnosis


(1) Suspected COVID-19 virus infection


Is this a current diagnosis for this admission?: Yes   


Plan: 


Rapid COVID-19 test yesterday is negative.


Continue to have a high suspicion for COVID-19 virus infection  due to the 

patient's fever and symptoms of dyspnea/cough without clear source of COPD 

exacerbation or pneumonia to attribute the symptoms to.


She also has numerous laboratory results supportive of COVID-19; elevated d-

dimer, transaminases, CRP, LDH, ferritin.


CTA negative for pulmonary embolus and pneumonia.


COPD exacerbation is not suspected (no wheezing on exam) and certainly would not

be because of the above-mentioned laboratory changes.





D-dimer improved (1.47)


CRP equivocal (132.7


Ferritin increased (581)


LDH increased (289)





Patient is admitted to medical floor and continued cardiac telemetry.


Due to elevated d-dimer, she is placed on full dose Lovenox.  Nursing tells me 

she has declined multiple doses.





She was empirically placed on IV azithromycin and ceftriaxone.  


Will discontinue ceftriaxone; received 3 doses.


Transition to oral azithromycin.





Decreased to Decadron 2 mg IV twice daily.


Continue vitamin C, vitamin D, zinc, and melatonin supplementation.


Provide supplemental oxygen as needed to maintain saturations greater than 89%.


Encourage pulmonary toilet.


Contact and droplet precautions.


Send out testing pending.  Discussed this with both nursing supervisor and 

infectious control today; patient meets clinical criteria for COVID-19 despite 

her prior negative testing.








(2) COPD (chronic obstructive pulmonary disease) with chronic bronchitis


Is this a current diagnosis for this admission?: Yes   


Plan: 


The patient reports COPD.  She states that she has not had an exacerbation in a 

long time.  She does have an albuterol rescue inhaler at home but has been not 

on any chronic daily inhaler therapy.  She does not use oxygen at home.





Currently on azithromycin and dexamethasone secondary to suspected COVID.


No wheezing noted on exam today.


Encourage pulmonary toilet with incentive spirometer, flutter valve.








(3) Coronary artery disease


Qualifiers: 


   Coronary Disease-Associated Artery/Lesion type: native artery   Native vs. 

transplanted heart: native heart   Associated angina: without angina   Qualified

Code(s): I25.10 - Atherosclerotic heart disease of native coronary artery 

without angina pectoris   


Is this a current diagnosis for this admission?: Yes   


Plan: 


The patient states that she had a stent placed approximately 10 years ago or 

more.  She was following with cardiology but then decided to change 

practitioners.  She states she would be seeing Dr. Uribe at [facility] 

to establish with new cardiology. 





Resume aspirin and statin therapy.


Resume a home antihypertensive regiment of amlodipine, lisinopril/HCTZ, 

bisprolol, and hydralazine.


Cardiac diet.








(4) Depression


Qualifiers: 


   Depression Type: unspecified   Qualified Code(s): F32.9 - Major depressive 

disorder, single episode, unspecified   


Is this a current diagnosis for this admission?: Yes   


Plan: 


Home dose Celexa.


Monitor QTC while given concurrently with azithromycin.


Vistaril as needed anxiety








(5) Diabetes mellitus


Qualifiers: 


   Diabetes mellitus type: type 1   Diabetes mellitus complication status: 

without complication   Qualified Code(s): E10.9 - Type 1 diabetes mellitus 

without complications   


Is this a current diagnosis for this admission?: Yes   


Plan: 


A1c 6.4%.


Resume home dose NPH.


Accu-Cheks before meals and at bedtime with Humalog per sliding scale coverage.


Hypoglycemia protocol.


Cardiac/consistent carb diet.








(6) Elevated d-dimer


Is this a current diagnosis for this admission?: Yes   


Plan: 


CTA chest negative for pulmonary embolus.


Improving; d-dimer down to 1.47





Because there is a suspicion of Covid-19 and her d-dimer is greater than 1.0 she

will be placed on full anticoagulant therapy with Lovenox 1 mg/kg every 12 

hours.








(7) Elevated liver function tests


Is this a current diagnosis for this admission?: Yes   


Plan: 


Resolved.


She reports rare to occasional glasses of wine.  No other indication for 

elevated transaminases.  





Transaminitis is 1 of the abnormalities seen in COVID.  


Continue to monitor.








(8) Fever


Qualifiers: 


   Fever type: unspecified   Qualified Code(s): R50.9 - Fever, unspecified   


Is this a current diagnosis for this admission?: Yes   


Plan: 


Fever along with some of her symptoms and laboratory studies certainly create a 

high index of suspicion for Covid-19. 


T-max 103.1/48 hours.  Afebrile x24 hrs.





Acetaminophen for elevated temperature and antibiotics as well as other 

recommended COVID treatments until the serology returns


Blood cultures (1/4 bottles) shows coag negative staph; contaminant.


Antibiotics as above.








(9) Hypertension


Qualifiers: 


   Hypertension type: essential hypertension   Qualified Code(s): I10 - 

Essential (primary) hypertension   


Is this a current diagnosis for this admission?: Yes   


Plan: 


Resume a home antihypertensive regiment of lisinopril/HCTZ, bisprolol, and 

hydralazine.


Increased amlodipine to 10 mg daily.


Cardiac diet.








(10) Hyponatremia


Is this a current diagnosis for this admission?: Yes   


Plan: 


Chronic per patient, however, labs from January 2020 show sodium 134.8; therefor

e, her hyponatremia today is significantly lower than what is presumed to be her

baseline.


Sodium trending overall stable; 125.3-> 122.5-> 125.1





Liberalize dietary sodium.


Fluid restrict 1.2 L/daily


Follow up chemistry.








(11) Hypothyroidism, postsurgical


Is this a current diagnosis for this admission?: Yes   


Plan: 


Continue home dose levothyroxine.








- Time


Time Spent with patient: 35 or more minutes


Medications reviewed and adjusted accordingly: Yes


Anticipated Discharge Disposition: Home, Self Care


Anticipated Discharge Timeframe: within 48 hours

## 2020-07-30 LAB
ANION GAP SERPL CALC-SCNC: 6 MMOL/L (ref 5–19)
BUN SERPL-MCNC: 26 MG/DL (ref 7–20)
CALCIUM: 9.3 MG/DL (ref 8.4–10.2)
CHLORIDE SERPL-SCNC: 96 MMOL/L (ref 98–107)
CO2 SERPL-SCNC: 25 MMOL/L (ref 22–30)
ERYTHROCYTE [DISTWIDTH] IN BLOOD BY AUTOMATED COUNT: 14.6 % (ref 11.5–14)
GLUCOSE SERPL-MCNC: 245 MG/DL (ref 75–110)
HCT VFR BLD CALC: 27.2 % (ref 36–47)
HGB BLD-MCNC: 9.5 G/DL (ref 12–15.5)
MCH RBC QN AUTO: 28.7 PG (ref 27–33.4)
MCHC RBC AUTO-ENTMCNC: 35.1 G/DL (ref 32–36)
MCV RBC AUTO: 82 FL (ref 80–97)
PLATELET # BLD: 152 10^3/UL (ref 150–450)
POTASSIUM SERPL-SCNC: 4.5 MMOL/L (ref 3.6–5)
RBC # BLD AUTO: 3.32 10^6/UL (ref 3.72–5.28)
WBC # BLD AUTO: 7.1 10^3/UL (ref 4–10.5)

## 2020-07-30 PROCEDURE — B24BZZZ ULTRASONOGRAPHY OF HEART WITH AORTA: ICD-10-PCS

## 2020-07-30 RX ADMIN — INSULIN HUMAN SCH UNIT: 100 INJECTION, SUSPENSION SUBCUTANEOUS at 22:13

## 2020-07-30 RX ADMIN — LEVOTHYROXINE SODIUM SCH MG: 75 TABLET ORAL at 06:16

## 2020-07-30 RX ADMIN — ENOXAPARIN SODIUM SCH MG: 80 INJECTION SUBCUTANEOUS at 09:44

## 2020-07-30 RX ADMIN — HYDRALAZINE HYDROCHLORIDE SCH MG: 25 TABLET, FILM COATED ORAL at 22:11

## 2020-07-30 RX ADMIN — MELATONIN SCH MG: 3 TAB ORAL at 22:11

## 2020-07-30 RX ADMIN — PANTOPRAZOLE SODIUM SCH MG: 40 TABLET, DELAYED RELEASE ORAL at 17:33

## 2020-07-30 RX ADMIN — CITALOPRAM HYDROBROMIDE SCH MG: 20 TABLET ORAL at 09:41

## 2020-07-30 RX ADMIN — METOPROLOL TARTRATE SCH MG: 25 TABLET, FILM COATED ORAL at 17:33

## 2020-07-30 RX ADMIN — PANTOPRAZOLE SODIUM SCH MG: 40 TABLET, DELAYED RELEASE ORAL at 06:16

## 2020-07-30 RX ADMIN — METOPROLOL TARTRATE SCH MG: 25 TABLET, FILM COATED ORAL at 12:27

## 2020-07-30 RX ADMIN — OXYCODONE HYDROCHLORIDE AND ACETAMINOPHEN SCH MG: 500 TABLET ORAL at 09:41

## 2020-07-30 RX ADMIN — DEXAMETHASONE SODIUM PHOSPHATE SCH MG: 4 INJECTION, SOLUTION INTRAMUSCULAR; INTRAVENOUS at 09:44

## 2020-07-30 RX ADMIN — Medication SCH TAB: at 22:11

## 2020-07-30 RX ADMIN — FOLIC ACID SCH MG: 1 TABLET ORAL at 09:41

## 2020-07-30 RX ADMIN — INSULIN LISPRO SCH UNIT: 100 INJECTION, SOLUTION INTRAVENOUS; SUBCUTANEOUS at 17:34

## 2020-07-30 RX ADMIN — AZITHROMYCIN SCH MG: 250 TABLET, FILM COATED ORAL at 17:33

## 2020-07-30 RX ADMIN — Medication SCH: at 14:15

## 2020-07-30 RX ADMIN — ASPIRIN SCH MG: 81 TABLET, COATED ORAL at 09:41

## 2020-07-30 RX ADMIN — Medication SCH MG: at 09:41

## 2020-07-30 RX ADMIN — ATORVASTATIN CALCIUM SCH MG: 20 TABLET, FILM COATED ORAL at 22:12

## 2020-07-30 RX ADMIN — ATENOLOL SCH MG: 50 TABLET ORAL at 09:42

## 2020-07-30 RX ADMIN — Medication SCH: at 22:13

## 2020-07-30 RX ADMIN — OXYCODONE HYDROCHLORIDE AND ACETAMINOPHEN SCH MG: 500 TABLET ORAL at 17:33

## 2020-07-30 RX ADMIN — Medication SCH: at 06:16

## 2020-07-30 RX ADMIN — INSULIN LISPRO SCH UNIT: 100 INJECTION, SOLUTION INTRAVENOUS; SUBCUTANEOUS at 12:32

## 2020-07-30 RX ADMIN — AMLODIPINE BESYLATE SCH MG: 10 TABLET ORAL at 09:43

## 2020-07-30 RX ADMIN — VITAMIN D, TAB 1000IU (100/BT) SCH UNIT: 25 TAB at 09:40

## 2020-07-30 RX ADMIN — MULTIVITAMIN TABLET SCH TAB: TABLET at 09:41

## 2020-07-30 RX ADMIN — LISINOPRIL SCH MG: 10 TABLET ORAL at 09:43

## 2020-07-30 RX ADMIN — HYDRALAZINE HYDROCHLORIDE SCH MG: 25 TABLET, FILM COATED ORAL at 09:43

## 2020-07-30 RX ADMIN — ENOXAPARIN SODIUM SCH MG: 80 INJECTION SUBCUTANEOUS at 22:12

## 2020-07-30 RX ADMIN — INSULIN LISPRO SCH UNIT: 100 INJECTION, SOLUTION INTRAVENOUS; SUBCUTANEOUS at 22:12

## 2020-07-30 RX ADMIN — INSULIN HUMAN SCH UNIT: 100 INJECTION, SUSPENSION SUBCUTANEOUS at 08:44

## 2020-07-30 RX ADMIN — HYDROCHLOROTHIAZIDE SCH: 12.5 CAPSULE ORAL at 09:42

## 2020-07-30 RX ADMIN — INSULIN LISPRO SCH UNIT: 100 INJECTION, SOLUTION INTRAVENOUS; SUBCUTANEOUS at 08:43

## 2020-07-30 NOTE — PDOC PROGRESS REPORT
Subjective


Progress Note for:: 07/30/20


Subjective:: 


Patient was seen on afternoon rounds.  She is found ambulating in her room, 

comfortably, on room air.  More calm; less anxious today.  She reports that her 

dyspnea is resolved.  Cough is much improved.  Slept well last night; feels that

the IS and Flutter valve are helping greatly.


She denies fever, chills, chest pain, palpitations, abdominal pain, nausea, 

vomiting, and diarrhea.


Nursing reports recurrent a. fib with RVR today.


Reason For Visit: 


COPD EXACERBATION,CORONARY ARTERY DISEASE,HYPERTEN








Physical Exam


Vital Signs: 


                                        











Temp Pulse Resp BP Pulse Ox


 


 97.9 F   128 H  22 H  122/78   99 


 


 07/30/20 07:37  07/30/20 14:00  07/30/20 07:37  07/30/20 09:39  07/30/20 08:46








                                 Intake & Output











 07/29/20 07/30/20 07/31/20





 06:59 06:59 06:59


 


Intake Total 530 1470 240


 


Output Total 200  


 


Balance 330 1470 240


 


Weight 91.4 kg  











General appearance: PRESENT: no acute distress, cooperative, well-developed, 

well-nourished, other - overweight


Head exam: PRESENT: atraumatic, normocephalic


Eye exam: PRESENT: conjunctiva pink, EOMI, PERRLA.  ABSENT: scleral icterus


Mouth exam: PRESENT: moist, tongue midline


Respiratory exam: PRESENT: clear to auscultation steven, symmetrical, unlabored.  

ABSENT: rales, rhonchi, wheezes


Cardiovascular exam: PRESENT: irregular rhythm, tachycardia.  ABSENT: diastolic 

murmur, rubs, systolic murmur


Vascular exam: PRESENT: normal capillary refill


Extremities exam: PRESENT: full ROM.  ABSENT: calf tenderness, clubbing, pedal 

edema


Musculoskeletal exam: PRESENT: ambulatory


Neurological exam: PRESENT: alert, awake, oriented to person, oriented to place,

oriented to time, oriented to situation, CN II-XII grossly intact.  ABSENT: 

motor sensory deficit


Psychiatric exam: PRESENT: appropriate affect, normal mood.  ABSENT: homicidal 

ideation, suicidal ideation


Skin exam: PRESENT: dry, intact, warm.  ABSENT: cyanosis, rash





Results


Laboratory Results: 


                                        





                                 07/30/20 04:25 





                                 07/30/20 04:25 





                                        











  07/30/20 07/30/20





  04:25 04:25


 


WBC  7.1 


 


RBC  3.32 L 


 


Hgb  9.5 L 


 


Hct  27.2 L 


 


MCV  82 


 


MCH  28.7 


 


MCHC  35.1 


 


RDW  14.6 H 


 


Plt Count  152 


 


Sodium   127.3 L


 


Potassium   4.5


 


Chloride   96 L


 


Carbon Dioxide   25


 


Anion Gap   6


 


BUN   26 H


 


Creatinine   0.91


 


Est GFR (African Amer)   > 60


 


Glucose   245 H


 


Calcium   9.3








                                        





07/27/20 18:25   Blood   Blood Culture (PCR) - Final


                            Staphylococcus Species


07/27/20 18:25   Blood   Blood Culture - Final


                            Staphylococcus Hominis








Impressions: 


                                        





Chest X-Ray  07/27/20 15:20


IMPRESSION:  COPD.  NO ACUTE RADIOGRAPHIC FINDING IN THE CHEST.


 








Chest/Abdomen CTA  07/27/20 18:00


IMPRESSION: 


 


Mild emphysema.


 














Assessment and Plan





- Diagnosis


(1) Atrial fibrillation with RVR


Is this a current diagnosis for this admission?: Yes   


Plan: 


Atrial fibrillation with RVR.


Occurred earlier this admission; resolved following diltiazem 20 mg IV push.


Reoccurred today.





Cardiology was consulted; discussed with Dr. Haro.


He advises to discontinue atenolol.


Start Toprol 25 mg every 6 hours.


Obtain stat echocardiogram.


Continue anticoagulation; plan for discharge on Eliquis.








(2) COVID-19 determined by clinical diagnostic criteria


Is this a current diagnosis for this admission?: Yes   


Plan: 


Continue to have a high suspicion for COVID-19 virus infection 


Rapid and send out COVID-19 tests are negative.


However, she has numerous laboratory results supportive of COVID-19 (elevated d-

dimer, transaminases, CRP, LDH, ferritin) that can not be explained otherwise.


CTA negative for pulmonary embolus and pneumonia.


COPD exacerbation is not suspected (no wheezing on exam) and certainly would not

be the cause of the above-mentioned laboratory changes.





D-dimer improved (1.47)


CRP equivocal (132.7


Ferritin increased (581)


LDH increased (289)





Patient is admitted to medical floor and continued cardiac telemetry.


Due to elevated d-dimer, she is placed on full dose Lovenox.  


Received 3 days of Rocephin


Transition to oral azithromycin; last dose tomorrow.


Initially placed on Decadron; will transition to p.o. prednisone.


Continue vitamin C, vitamin D, zinc, and melatonin supplementation.


Encourage pulmonary toilet.


Contact and droplet precautions.








(3) COPD (chronic obstructive pulmonary disease) with chronic bronchitis


Is this a current diagnosis for this admission?: Yes   


Plan: 


Stable and without exacerbation at this time.


The patient reports COPD.  She states that she has not had an exacerbation in a 

long time.  She does have an albuterol rescue inhaler at home but has been not 

on any chronic daily inhaler therapy.  She does not use oxygen at home.








(4) Coronary artery disease


Qualifiers: 


   Coronary Disease-Associated Artery/Lesion type: native artery   Native vs. 

transplanted heart: native heart   Associated angina: without angina   Qualified

Code(s): I25.10 - Atherosclerotic heart disease of native coronary artery 

without angina pectoris   


Is this a current diagnosis for this admission?: Yes   


Plan: 


The patient states that she had a stent placed approximately 10 years ago or 

more.  She was following with cardiology but then decided to change 

practitioners.  She states she would be seeing Dr. Uribe at [facility] 

to establish with new cardiology. 





Resume aspirin and statin therapy.


Resume a home antihypertensive regiment of amlodipine, lisinopril/HCTZ, 

bisprolol, and hydralazine.


Cardiac diet.








(5) Depression


Qualifiers: 


   Depression Type: unspecified   Qualified Code(s): F32.9 - Major depressive 

disorder, single episode, unspecified   


Is this a current diagnosis for this admission?: Yes   


Plan: 


Home dose Celexa.


Monitor QTC while given concurrently with azithromycin.


Vistaril as needed anxiety








(6) Diabetes mellitus


Qualifiers: 


   Diabetes mellitus type: type 1   Diabetes mellitus complication status: 

without complication   Qualified Code(s): E10.9 - Type 1 diabetes mellitus 

without complications   


Is this a current diagnosis for this admission?: Yes   


Plan: 


A1c 6.4%.


Resume home dose NPH.


Accu-Cheks before meals and at bedtime with Humalog per sliding scale coverage.


Hypoglycemia protocol.


Cardiac/consistent carb diet.








(7) Elevated d-dimer


Is this a current diagnosis for this admission?: Yes   


Plan: 


CTA chest negative for pulmonary embolus.


Improving; d-dimer down to 1.47





Because there is a suspicion of Covid-19 and her d-dimer is greater than 1.0 she

will be placed on full anticoagulant therapy with Lovenox 1 mg/kg every 12 

hours.








(8) Elevated liver function tests


Is this a current diagnosis for this admission?: Yes   


Plan: 


Resolved.


She reports rare to occasional glasses of wine.  No other indication for 

elevated transaminases.  





Transaminitis is 1 of the abnormalities seen in COVID.  


Continue to monitor.








(9) Fever


Qualifiers: 


   Fever type: unspecified   Qualified Code(s): R50.9 - Fever, unspecified   


Is this a current diagnosis for this admission?: Yes   


Plan: 


Fever along with some of her symptoms and laboratory studies certainly create a 

high index of suspicion for Covid-19. 


Afebrile x48 hrs.





Acetaminophen for elevated temperature and antibiotics as well as other 

recommended COVID treatments until the serology returns


Blood cultures (1/4 bottles) shows coag negative staph; contaminant.


Antibiotics as above.








(10) Hypertension


Qualifiers: 


   Hypertension type: essential hypertension   Qualified Code(s): I10 - [place]

tial (primary) hypertension   


Is this a current diagnosis for this admission?: Yes   


Plan: 


Resume a home antihypertensive regiment of lisinopril/HCTZ, bisprolol, and 

hydralazine.


Increased amlodipine to 10 mg daily.


Cardiac diet.








(11) Hyponatremia


Is this a current diagnosis for this admission?: Yes   


Plan: 


Chronic per patient, however, labs from January 2020 show sodium 134.8; 

therefore, her hyponatremia today is significantly lower than what is presumed 

to be her baseline.


Sodium trending overall stable; 125.3-> 122.5-> 125.1





Liberalize dietary sodium.


Fluid restrict 1.2 L/daily


Follow up chemistry.








(12) Hypothyroidism, postsurgical


Is this a current diagnosis for this admission?: Yes   


Plan: 


Continue home dose levothyroxine.








(13) Suspected COVID-19 virus infection


Is this a current diagnosis for this admission?: Yes   


Plan: 


As above.








- Time


Time Spent with patient: 25-34 minutes


Medications reviewed and adjusted accordingly: Yes


Anticipated Discharge Disposition: Home, Self Care


Anticipated Discharge Timeframe: pending cardiology evaluation/recommendations

## 2020-07-31 LAB
ANION GAP SERPL CALC-SCNC: 10 MMOL/L (ref 5–19)
BUN SERPL-MCNC: 23 MG/DL (ref 7–20)
CALCIUM: 9.1 MG/DL (ref 8.4–10.2)
CHLORIDE SERPL-SCNC: 96 MMOL/L (ref 98–107)
CO2 SERPL-SCNC: 25 MMOL/L (ref 22–30)
ERYTHROCYTE [DISTWIDTH] IN BLOOD BY AUTOMATED COUNT: 15 % (ref 11.5–14)
GLUCOSE SERPL-MCNC: 110 MG/DL (ref 75–110)
HCT VFR BLD CALC: 32.6 % (ref 36–47)
HGB BLD-MCNC: 11.2 G/DL (ref 12–15.5)
MCH RBC QN AUTO: 28.5 PG (ref 27–33.4)
MCHC RBC AUTO-ENTMCNC: 34.5 G/DL (ref 32–36)
MCV RBC AUTO: 83 FL (ref 80–97)
PLATELET # BLD: 202 10^3/UL (ref 150–450)
POTASSIUM SERPL-SCNC: 4.3 MMOL/L (ref 3.6–5)
RBC # BLD AUTO: 3.94 10^6/UL (ref 3.72–5.28)
WBC # BLD AUTO: 8.2 10^3/UL (ref 4–10.5)

## 2020-07-31 RX ADMIN — METOPROLOL TARTRATE SCH: 50 TABLET, FILM COATED ORAL at 18:58

## 2020-07-31 RX ADMIN — METOPROLOL TARTRATE SCH MG: 25 TABLET, FILM COATED ORAL at 05:45

## 2020-07-31 RX ADMIN — LISINOPRIL SCH: 10 TABLET ORAL at 10:01

## 2020-07-31 RX ADMIN — ATORVASTATIN CALCIUM SCH MG: 20 TABLET, FILM COATED ORAL at 22:28

## 2020-07-31 RX ADMIN — PREDNISONE SCH MG: 20 TABLET ORAL at 09:57

## 2020-07-31 RX ADMIN — MELATONIN SCH MG: 3 TAB ORAL at 22:28

## 2020-07-31 RX ADMIN — ENOXAPARIN SODIUM SCH MG: 80 INJECTION SUBCUTANEOUS at 22:26

## 2020-07-31 RX ADMIN — OXYCODONE HYDROCHLORIDE AND ACETAMINOPHEN SCH MG: 500 TABLET ORAL at 17:03

## 2020-07-31 RX ADMIN — INSULIN HUMAN SCH UNIT: 100 INJECTION, SUSPENSION SUBCUTANEOUS at 22:27

## 2020-07-31 RX ADMIN — ASPIRIN SCH MG: 81 TABLET, COATED ORAL at 09:57

## 2020-07-31 RX ADMIN — Medication SCH MG: at 09:57

## 2020-07-31 RX ADMIN — Medication SCH TAB: at 22:28

## 2020-07-31 RX ADMIN — INSULIN HUMAN SCH UNIT: 100 INJECTION, SUSPENSION SUBCUTANEOUS at 09:58

## 2020-07-31 RX ADMIN — OXYCODONE HYDROCHLORIDE AND ACETAMINOPHEN SCH MG: 500 TABLET ORAL at 09:57

## 2020-07-31 RX ADMIN — INSULIN LISPRO SCH: 100 INJECTION, SOLUTION INTRAVENOUS; SUBCUTANEOUS at 12:36

## 2020-07-31 RX ADMIN — METOPROLOL TARTRATE SCH MG: 25 TABLET, FILM COATED ORAL at 12:32

## 2020-07-31 RX ADMIN — MULTIVITAMIN TABLET SCH TAB: TABLET at 09:57

## 2020-07-31 RX ADMIN — VITAMIN D, TAB 1000IU (100/BT) SCH UNIT: 25 TAB at 09:57

## 2020-07-31 RX ADMIN — METOPROLOL TARTRATE SCH MG: 25 TABLET, FILM COATED ORAL at 17:03

## 2020-07-31 RX ADMIN — PANTOPRAZOLE SODIUM SCH MG: 40 TABLET, DELAYED RELEASE ORAL at 05:45

## 2020-07-31 RX ADMIN — HYDROCHLOROTHIAZIDE SCH: 12.5 CAPSULE ORAL at 10:00

## 2020-07-31 RX ADMIN — PANTOPRAZOLE SODIUM SCH MG: 40 TABLET, DELAYED RELEASE ORAL at 16:54

## 2020-07-31 RX ADMIN — INSULIN LISPRO SCH: 100 INJECTION, SOLUTION INTRAVENOUS; SUBCUTANEOUS at 09:32

## 2020-07-31 RX ADMIN — Medication SCH: at 13:32

## 2020-07-31 RX ADMIN — METOPROLOL TARTRATE SCH MG: 50 TABLET, FILM COATED ORAL at 23:17

## 2020-07-31 RX ADMIN — CITALOPRAM HYDROBROMIDE SCH MG: 20 TABLET ORAL at 09:57

## 2020-07-31 RX ADMIN — HYDRALAZINE HYDROCHLORIDE SCH: 25 TABLET, FILM COATED ORAL at 10:00

## 2020-07-31 RX ADMIN — LEVOTHYROXINE SODIUM SCH MG: 75 TABLET ORAL at 05:45

## 2020-07-31 RX ADMIN — Medication SCH: at 05:58

## 2020-07-31 RX ADMIN — FOLIC ACID SCH MG: 1 TABLET ORAL at 09:57

## 2020-07-31 RX ADMIN — ENOXAPARIN SODIUM SCH MG: 80 INJECTION SUBCUTANEOUS at 09:58

## 2020-07-31 RX ADMIN — INSULIN LISPRO SCH UNIT: 100 INJECTION, SOLUTION INTRAVENOUS; SUBCUTANEOUS at 22:26

## 2020-07-31 RX ADMIN — AMLODIPINE BESYLATE SCH: 10 TABLET ORAL at 10:00

## 2020-07-31 RX ADMIN — INSULIN LISPRO SCH UNIT: 100 INJECTION, SOLUTION INTRAVENOUS; SUBCUTANEOUS at 16:54

## 2020-07-31 RX ADMIN — Medication SCH: at 22:29

## 2020-07-31 RX ADMIN — METOPROLOL TARTRATE SCH MG: 25 TABLET, FILM COATED ORAL at 00:48

## 2020-07-31 NOTE — XCELERA REPORT
[address] [address]

                               Tel: [phone]

                               Fax: [phone]



                      Transthoracic Echocardiogram Report

_______________________________________________________________________________



Name: NADEEN BARRIOS

MRN: [id number]                            Age: 64 yrs

Gender: Female                             : 1955

Patient Status: Inpatient                  Patient Location: [address] [place] [address]

Account #: [id number]

Study Date: 2020 02:49 PM

Accession #: [id number]

_______________________________________________________________________________



Height: 62 in        Weight: 201 lb        BSA: 1.9 m2

_______________________________________________________________________________

Procedure: A complete two-dimensional transthoracic echocardiogram was

performed (2D, M-mode, spectral and color flow Doppler). The study was

technically adequate with some images being suboptimal in quality.

Reason For Study: a. annie, CHF





Ordering Physician: WILIAM WILLIS

Performed By: Sandra Jenkins



_______________________________________________________________________________



Interpretation Summary

The left ventricle is grossly normal size.

LV systolic function is difficult to assess due to rapid atrial fibrillation

however it is grossly preserved.

LV diastolic function could not be adequately assessed due to atrial

fibrilation.

The left ventricular wall motion is normal.

There is no thrombus.

LV diastolic function could not be adequately assessed due to atrial

fibrilation.

PFO is suspected, recommend saline contrast study in the outpatient setting.

Mild MR, mild to moderate TR, trace PI.

No prior studies for comparison.



MMode/2D Measurements & Calculations

RVDd: 3.5 cm   LVIDd: 4.2 cm   FS: 30.0 %             Ao root diam: 2.4 cm

IVSd: 0.83 cm  LVIDs: 2.9 cm   EDV(Teich): 79.1 ml

                                                      Ao root area: 4.6 cm2

               LVPWd: 0.84 cm  ESV(Teich): 33.5 ml    LA dimension: 3.2 cm

                               EF(Teich): 57.6 %



Doppler Measurements & Calculations

MV E max sydnee:      MV P1/2t max sydnee:     Ao V2 max:         LV V1 max P.9 cm/sec       127.8 cm/sec          150.5 cm/sec       4.4 mmHg

                   MV P1/2t: 49.7 msec   Ao max P.1 mmHgLV V1 max:

                   MVA(P1/2t): 4.4 cm2                      105.1 cm/sec

                   MV dec slope:



                   753.5 cm/sec2

                   MV dec time: 0.17 sec

        _______________________________________________________________

PA V2 max:         TR max sydnee:           MV P1/2t-pr_phl:

85.4 cm/sec        285.0 cm/sec          49.7 msec

PA max P.9 mmHgTR max P.5 mmHg





Left Ventricle

The left ventricle is grossly normal size. LV systolic function is difficult

to assess due to rapid atrial fibrillation however it is grossly preserved. LV

diastolic function could not be adequately assessed due to atrial fibrilation.

The left ventricular wall motion is normal. There is no thrombus.



Right Ventricle

The right ventricle is normal in size and function. There is normal right

ventricular wall thickness. The right ventricular systolic function is normal.



Atria

The right atrium is normal. The left atrial size is normal. A patent foramen

ovale is suspected.



Mitral Valve

The mitral valve is normal in structure and function. There is no mitral valve

stenosis. There is a mild amount of mitral regurgitation.



Aortic Valve

The aortic valve is normal in structure and functions normally. There is no

aortic valve stenosis. No aortic regurgitation is present.





Tricuspid Valve

The tricuspid is normal in structure and function. There is a mild to moderate

amount of tricuspid regurgitation.



Pulmonic Valve

The pulmonic valve is normal in structure and function. There is a trace or

physiologic amount of pulmonic regurgitation.



Great Vessels

The inferior vena cava appeared normal and decreased < 50% with respiration

(RAP 10-15 mmHg).



Effusions

Normal pericardial fluid noted. There is no pleural effusion.





_______________________________________________________________________________

_______________________________________________________________________________



Electronically signed by:      Dawood Haro      on 2020 09:32 AM



CC: WILIAM WILLIS Antonio

## 2020-07-31 NOTE — RADIOLOGY REPORT (SQ)
EXAM DESCRIPTION: 



CT CHEST WITH IV CONTRAST



COMPLETED DATE/TME:  07/31/2020 00:00



CLINICAL HISTORY: 



64 years, Female, dyspnea



COMPARISON:

Prior CT chest dated 7/27/2020



TECHNIQUE:

Contrast enhanced CT of the chest was acquired. Images were

obtained after the administration of 73 mL of Omnipaque 350

intravenous contrast.  Images stored on PACS.

 

All CT scanners at this facility use dose modulation, iterative

reconstruction, and/or weight based dosing when appropriate to

reduce radiation dose to as low as reasonably achievable (ALARA).





[facility]: Dose Right [facility]: CareDose   [facility]: Dose Right    [facility]:

Teradose 4D    [facility]: Smart Technologies



LIMITATIONS:

None.



FINDINGS:



Central airways are patent. Lung windows show biapical scarring.



Visualized is a 3 mm solid nodule within the left upper lobe on

image 13 of series 4. An additional 4 mm subpleural nodule is

noted within the right lower lobe on image 31 of series 4.



Lungs are otherwise clear.



Mediastinal windows show no significant hilar or mediastinal

lymph node enlargement. Minimal calcifications are noted about

the coronary vessels and thoracic aorta. Visualized pulmonary

vasculature appears to opacify with contrast normally.



Limited evaluation of the upper abdomen reveals calcifications

about the abdominal aorta. Splenic varices are evident along with

a suspected splenorenal shunt.



Bone windows show a subacute fracture involving the left anterior

fifth rib. No destructive osseous lesions.





IMPRESSION:



No acute abnormality within the chest.



Subacute fracture involving the left anterior fifth rib.



3.0 mm solid pulmonary nodule within the upper lobe. If patient

is low risk for malignancy, no routine follow-up imaging is

recommended; if patient is high risk for malignancy, a

non-contrast Chest CT at 12 months is optional. If performed and

the nodule is stable at 12 months, no further follow-up is

recommended.

These guidelines do not apply to immunocompromised patients and

patients with cancer. Follow up in patients with significant

comorbidities as clinically warranted. For lung cancer screening,

adhere to Lung-RADS guidelines. Reference: Radiology. 2017;

284(1):228-43.



Splenic varices with splenorenal shunt.

 

TECHNICAL DOCUMENTATION:



Quality ID # [id number]: Final reports with documentation of one or more

dose reduction techniques (e.g., Automated exposure control,

adjustment of the mA and/or kV according to patient size, use of

iterative reconstruction technique)



copyright 2011 [employer]- All Rights Reserved

## 2020-07-31 NOTE — RADIOLOGY REPORT (SQ)
EXAM DESCRIPTION:  CHEST SINGLE VIEW



IMAGES COMPLETED DATE/TIME:  7/31/2020 7:50 am



REASON FOR STUDY:  SOB



COMPARISON:  7/27/2020



EXAM PARAMETERS:  NUMBER OF VIEWS: One view.

TECHNIQUE: Single frontal radiographic view of the chest acquired.

RADIATION DOSE: NA

LIMITATIONS: None.



FINDINGS:  LUNGS AND PLEURA: No opacities, masses or pneumothorax. No pleural effusion.  Mild hyperex
pansion.  Recent CT demonstrated mild emphysematous change.

MEDIASTINUM AND HILAR STRUCTURES: No masses.  Contour normal.

HEART AND VASCULAR STRUCTURES: Stable in appearance.  No failure.

BONES: Degenerative changes in the 1st anterior rib.

HARDWARE: None in the chest.

OTHER: No other significant finding.



IMPRESSION:  No interval change in the chest.



TECHNICAL DOCUMENTATION:  JOB ID:  [id number]

 2011 [employer]- All Rights Reserved



Reading location - IP/workstation name: [email or web address]

## 2020-07-31 NOTE — PDOC CONSULTATION
Consultation


Consult Date: 07/31/20


Attending physician:: WILIAM WILLIS


Provider Consulted: MADAY HILARIO


Consult reason:: PAF





History of Present Illness


Admission Date/PCP: 


  07/27/20 19:30





  HALLEY HOLT MD





History of Present Illness: 


NADEEN BARRIOS is a 64 year old female with a history of coronary artery 

disease status post PCI to the proximal RCA with a 4.0 mm x 18 mm Elite stent in

May 2002, palpitations, PACs in the past, COPD, hyperlipidemia, hypertension, 

depression, diabetes mellitus and hyponatremia who was admitted to [facility] on 

07/27/2020 with several weeks of increasing shortness of breath and cough as 

well as fevers and who is consulted to our service for rapid atrial 

fibrillation.  The hospitalist service has been treating her for COVID-19 as she

clinically is having all symptoms and signs of COVID infection despite negative 

testing.  The patient had been responding appropriately to treatment for her 

pulmonary condition however began to have episodes of paroxysmal atrial 

fibrillation recently and, as of yesterday, had remained in rapid atrial 

fibrillation.  She was begun on Lopressor 25 p.o. every 6 with some improvement 

in her pulse.  This morning she is found in bed, resting comfortably although 

she complains of shortness of breath and racing heart.  She feels hot to the 

touch.





Physical exam on 07/31/2020:


GENERAL:  Pleasant and conversational.  Oriented x3 with normal mood.  Not in 

acute distress however she is speaking in short sentences and appears to be 

short of breath.


HEENT:  Normocephalic, atraumatic.  Pupils equal.   Sclerae anicteric.  

Oropharynx moist. 


NECK:  No JVD.  No carotid bruits. 


LUNGS:  Clear to auscultation bilaterally, decreased breath sounds bilaterally. 

Normal respiratory effort without the use of accessory muscles or intercostal 

retractions.  


CARDIOVASCULAR:  Regular rate and rhythm, normal S1 and S2 without murmurs, 

rubs, or gallops.  PMI not displaced.


ABDOMEN: No masses or tenderness to palpation.  No bruit.  No splenomegaly or 

hepatomegaly. No abdominal aorta bruit noted.


EXTREMITIES: 1-2+ pitting edema bilaterally, no cyanosis, no clubbing.  +2 

pulses femoral and pedal pulses bilaterally.  


SKIN: No lesions or rashes.


MUSCULOSKELETAL:  No chest tenderness to palpation. 


NEUROLOGIC: Nonfocal.  No gross sensory or motor deficits bilateral upper or 

lower extremities.








Past Medical History


Cardiac Medical History: Reports: Coronary Artery Disease - 1 stent, 

Hyperlipidema, Hypertension


Pulmonary Medical History: Reports: Chronic Obstructive Pulmonary Disease (COPD)


EENT Medical History: Reports: Cataracts


Neurological Medical History: 


   Denies: Ischemic CVA, Multiple Sclerosis


Endocrine Medical History: Reports: Diabetes Mellitus Type 1 - Per the patient.,

Hypothyroidism


Renal/ Medical History: Reports: Chronic Kidney Disease, Other - Patient 

reports chronic kidney disease despite normal renal function


Malignancy Medical History: 


   Denies: Breast Cancer, Colorectal Cancer, Liver Cancer, Lung Cancer


GI Medical History: 


   Denies: Cirrhosis, Gastroesophageal Reflux Disease, Hiatal Hernia


Musculoskeltal Medical History: 


   Denies: Fibromyalgia


Skin Medical History: 


   Denies: Eczema, Psoriasis


Psychiatric Medical History: Reports: Depression


Traumatic Medical History: Reports: None


Hematology: Reports: Anemia - Possible myelodysplasia





Past Surgical History


Past Surgical History: Reports: Appendectomy, Cardiac Catheterization, Coronary 

Stent, Thyroidectomy, Tubal Ligation





Social History


Lives with: Spouse/Significant other


Smoking Status: Former Smoker


Electronic Cigarette use?: No


Frequency of Alcohol Use: Rare


Hx Recreational Drug Use: No


Hx Prescription Drug Abuse: No





- Advance Directive


Resuscitation Status: Full Code





Family History


Family History: CAD, DM, Hypertension


Parental Family History Reviewed: Yes


Children Family History Reviewed: Yes


Sibling(s) Family History Reviewed.: Yes





Medication/Allergy


Home Medications: 








Amlodipine Besylate [Norvasc 5 mg Tablet] 5 mg PO DAILY 07/27/20 


Atorvastatin Calcium [Lipitor 20 mg Tablet] 20 mg PO QHS 07/27/20 


Citalopram Hydrobromide [Celexa] 10 mg PO DAILY 07/27/20 


Folic Acid [Folvite 1 mg Tablet] 1 mg PO DAILY 07/27/20 


Hydralazine HCl [Apresoline 25 mg Tablet] 25 mg PO BID 07/27/20 


Insulin Lispro [Humalog Kwikpen U-100] 15 unit SQ TID 07/27/20 


Insulin NPH Human Isophane [Novolin N Flexpen] 20 unit SQ QPM 07/27/20 


Insulin NPH Human Isophane [Novolin N Flexpen] 25 unit SQ QAM 07/27/20 


Levothyroxine Sodium 88 mcg PO SUSA@1000 07/27/20 


Albuterol Sulfate [Proair HFA Inhalation Aerosol 8.5 gm MDI] 2 puff IH Q6HP PRN 

07/28/20 


Aspirin [Aspir-Low] 81 mg PO DAILY 07/28/20 


Bisoprolol Fumarate 10 mg PO DAILY 07/28/20 


Calcium Carbonate/Vitamin D3 [Calcium 600-D3 20Mcg(800 Unit)] 1 tab PO QHS 

07/28/20 


Cetirizine HCl [Zyrtec] 10 mg PO HSP PRN 07/28/20 


Levothyroxine Sodium [Levo-T] 75 mcg PO MOTUWETHFR@1000 07/28/20 


Lisinopril/Hydrochlorothiazide [Lisinopril-Hctz 20-12.5 mg Tab] 1 each PO DAILY 

07/28/20 


Multivit with Calcium,Iron,Min [Multiple Vitamins For Women] 1 each PO DAILY 

07/28/20 


Acetaminophen [Tylenol 325 mg Tablet] 650 mg PO Q4HP PRN  tablet 07/30/20 


Albuterol Sulfate [Proair HFA Inhalation Aerosol 8.5 gm MDI] 2 puff IH Q4HP PRN 

#1 hfa.aer.ad 07/30/20 


Azithromycin [Zithromax 250 mg Tablet] 250 mg PO QPM #2 tablet 07/30/20 


Pantoprazole Sodium [Protonix 40 mg Dr Tablet] 40 mg PO QAM #30 tablet.dr 

07/30/20 


Prednisone [Deltasone 20 mg Tablet] 60 mg PO DAILY #9 tablet 07/30/20 








Allergies/Adverse Reactions: 


                                        





Sulfa (Sulfonamide Antibiotics) Allergy (Verified 07/27/20 15:22)


   











Physical Exam


Vital Signs: 


                                        











Temp Pulse Resp BP Pulse Ox


 


 98.1 F   89   22 H  121/45 L  96 


 


 07/31/20 04:06  07/31/20 04:06  07/31/20 04:06  07/31/20 04:06  07/31/20 04:06








                                 Intake & Output











 07/30/20 07/31/20 08/01/20





 06:59 06:59 06:59


 


Intake Total 1470 2001 


 


Balance 1470 2001 


 


Weight  74.2 kg 














Results


Laboratory Results: 


                                        





                                 07/31/20 05:47 





                                 07/31/20 05:47 





                                        











  07/31/20 07/31/20





  05:47 05:47


 


WBC   8.2


 


RBC   3.94


 


Hgb   11.2 L


 


Hct   32.6 L


 


MCV   83


 


MCH   28.5


 


MCHC   34.5


 


RDW   15.0 H


 


Plt Count   202


 


Sodium  130.8 L 


 


Potassium  4.3 


 


Chloride  96 L 


 


Carbon Dioxide  25 


 


Anion Gap  10 


 


BUN  23 H 


 


Creatinine  0.90 


 


Est GFR (African Amer)  > 60 


 


Glucose  110 


 


Calcium  9.1 








                                        





07/27/20 18:25   Blood   Blood Culture (PCR) - Final


                            Staphylococcus Species


07/27/20 18:25   Blood   Blood Culture - Final


                            Staphylococcus Hominis








Impressions: 


                                        





Chest/Abdomen CTA  07/27/20 18:00


IMPRESSION: 


 


Mild emphysema.


 








                                        





                                 07/31/20 05:47 





                                 07/31/20 05:47 





                                        











MCV  83 fl (80-97)   07/31/20  05:47    


 


MCH  28.5 pg (27.0-33.4)   07/31/20  05:47    


 


MCHC  34.5 g/dL (32.0-36.0)   07/31/20  05:47    


 


RDW  15.0 % (11.5-14.0)  H  07/31/20  05:47    


 


Seg Neutrophils %  86.0 % (42-78)  H  07/28/20  05:06    


 


Chloride  96 mmol/L ()  L  07/31/20  05:47    


 


Carbon Dioxide  25 mmol/L (22-30)   07/31/20  05:47    


 


Anion Gap  10  (5-19)   07/31/20  05:47    


 


Est GFR ( Amer)  > 60  (>60)   07/31/20  05:47    


 


Est GFR (Non-Af Amer)  Cancelled   07/27/20  15:01    


 


Glucose  110 mg/dL ()   07/31/20  05:47    


 


Calcium  9.1 mg/dL (8.4-10.2)   07/31/20  05:47    


 


Phosphorus  3.2 mg/dL (2.5-4.5)   07/28/20  05:06    


 


Magnesium  1.5 mg/dL (1.6-2.3)  L  07/28/20  05:06    


 


Ferritin  581.00 ng/mL (11.1-264.0)  H  07/29/20  05:14    


 


Total Bilirubin  0.6 mg/dL (0.2-1.3)   07/27/20  16:25    


 


AST  84 U/L (14-36)  H  07/27/20  16:25    


 


Alkaline Phosphatase  69 U/L ()   07/27/20  16:25    


 


C-Reactive Protein  132.7 mg/L (<10.0)  H  07/29/20  05:14    


 


Total Protein  7.9 g/dL (6.3-8.2)   07/27/20  16:25    


 


Albumin  4.4 g/dL (3.5-5.0)   07/27/20  16:25    


 


Triglycerides  105 mg/dL (<150)   07/28/20  05:06    


 


Cholesterol  122.99 mg/dL (0-200)   07/28/20  05:06    


 


LDL Cholesterol Direct  46 mg/dL (<100)   07/28/20  05:06    


 


VLDL Cholesterol  21.0 mg/dL (10-31)   07/28/20  05:06    


 


HDL Cholesterol  52 mg/dL (>40)   07/28/20  05:06    


 


TSH  0.13 uIU/mL (0.47-4.68)  L  07/28/20  05:06    


 


Free T4  1.45 ng/dL (0.78-2.19)   07/28/20  05:06    


 


Free T3 pg/mL  1.81 pg/mL (2.77-5.27)  L  07/28/20  05:06    


 


Urine Color  YELLOW   07/28/20  20:55    


 


Urine Appearance  CLEAR   07/28/20  20:55    


 


Urine pH  6.0  (5.0-9.0)   07/28/20  20:55    


 


Ur Specific Gravity  1.024   07/28/20  20:55    


 


Urine Protein  100 mg/dL (NEGATIVE)  H  07/28/20  20:55    


 


Urine Glucose (UA)  >=500 mg/dL (NEGATIVE)  H  07/28/20  20:55    


 


Urine Ketones  NEGATIVE mg/dL (NEGATIVE)   07/28/20  20:55    


 


Urine Blood  NEGATIVE  (NEGATIVE)   07/28/20  20:55    


 


Urine RBC (Auto)  1 /HPF  07/28/20  20:55    








                                        





07/27/20 18:25   Blood   Blood Culture (PCR) - Final


                            Staphylococcus Species


07/27/20 18:25   Blood   Blood Culture - Final


                            Staphylococcus Hominis





                             Current Medication List











Generic Name Dose Route Start Last Admin





  Trade Name Freq  PRN Reason Stop Dose Admin


 


Acetaminophen  650 mg  07/27/20 19:19 





  Tylenol 325 Mg Tablet  PO  08/26/20 19:18 





  Q4HP PRN  





  FOR PAIN OR TEMP  


 


Al Hydrox/Mg Hydrox/Simethicone  15 ml  07/27/20 19:19 





  Maalox Plus Susp 30 Udcup  PO  08/26/20 19:18 





  Q6HP PRN  





  HEARTBURN  


 


Albuterol  2 puff  07/27/20 19:48 





  Proair Hfa Inhalation Aerosol 8.5 Gm Mdi  IH  08/26/20 19:47 





  Q4HP PRN  





  WHEEZING  


 


Amlodipine Besylate  10 mg  07/29/20 10:00  07/30/20 09:43





  Norvasc 10 Mg Tablet  PO  08/28/20 09:59  10 mg





  DAILY MISAEL   Administration


 


Ascorbic Acid  500 mg  07/28/20 10:00  07/30/20 17:33





  Vitamin C 500 Mg Tablet  PO  08/27/20 09:59  500 mg





  BID MISAEL   Administration


 


Aspirin  81 mg  07/29/20 10:00  07/30/20 09:41





  Ecotrin 81 Mg Ec Tablet  PO  08/28/20 09:59  81 mg





  DAILY MISAEL   Administration


 


Atorvastatin Calcium  20 mg  07/28/20 22:00  07/30/20 22:12





  Lipitor 20 Mg Tablet  PO  08/27/20 21:59  20 mg





  QHS MISAEL   Administration


 


Azithromycin  250 mg  07/28/20 18:00  07/30/20 17:33





  Zithromax 250 Mg Tablet  PO  08/04/20 17:59  250 mg





  QPM MISAEL   Administration


 


Calcium Carbonate  1 tab  07/28/20 22:00  07/30/20 22:11





  Caltrate 600-Vit D3 400 Tablet  PO  08/27/20 21:59  1 tab





  QHS MISAEL   Administration


 


Cetirizine HCl  10 mg  07/28/20 20:25 





  Zyrtec 10 Mg Tablet  PO  08/27/20 20:24 





  HSP PRN  





  FOR ALLERGIES  


 


Cholecalciferol  2,000 unit  07/28/20 10:00  07/30/20 09:40





  Vitamin D3 1000 Unit Tablet  PO  08/27/20 09:59  2,000 unit





  DAILY MISAEL   Administration


 


Citalopram Hydrobromide  10 mg  07/29/20 10:00  07/30/20 09:41





  Celexa 20 Mg Tablet  PO  08/28/20 09:59  10 mg





  DAILY MISAEL   Administration


 


Dextrose  12.5 gm  07/27/20 19:39 





  Dextrose Inj 50% Syringe (25 Gm/50 Ml)  IV  08/26/20 19:38 





  PRN PRN  





  FOR BG 50-69 IN ALERT PATIENT  





  Protocol  


 


Dextrose  25 gm  07/27/20 19:39 





  Dextrose Inj 50% Syringe (25 Gm/50 Ml)  IV  08/26/20 19:38 





  PRN PRN  





  PER PROTOCOL  





  Protocol  


 


Enoxaparin Sodium  80 mg  07/27/20 22:00  07/30/20 22:12





  Lovenox Inj 80 Mg/0.8 Ml Disp.Syrin  SUBCUT  08/26/20 21:59  80 mg





  Q12 MISAEL   Administration


 


Folic Acid  1 mg  07/29/20 10:00  07/30/20 09:41





  Folvite 1 Mg Tablet  PO  08/28/20 09:59  1 mg





  DAILY MISAEL   Administration


 


Glucagon  1 mg  07/27/20 19:39 





  Glucagen Inj 1 Mg Vial  IM  08/26/20 19:38 





  PRN PRN  





  Evaluate for BG < 70  





  Protocol  


 


Glucose  15 gm  07/27/20 19:39 





  Glutose 40% Gel 15 Gm Tube  PO  08/26/20 19:38 





  PRN PRN  





  FOR BG 50-69 IN ALERT PATIENT  





  Protocol  


 


Glucose  30 gm  07/27/20 19:39 





  Glutose 40% Gel 15 Gm Tube  PO  08/26/20 19:38 





  PRN PRN  





  FOR BG < 50 IN ALERT PATIENT   





  Protocol  


 


Hydralazine HCl  25 mg  07/29/20 10:00  07/30/20 22:11





  Apresoline 25 Mg Tablet  PO  08/28/20 09:59  25 mg





  Q12 MISAEL   Administration


 


Hydrochlorothiazide  12.5 mg  07/29/20 10:00  07/30/20 09:42





  Hydrodiuril 12.5 Mg Tablet  PO  08/28/20 09:59  Not Given





  DAILY MISAEL  


 


Hydroxyzine Pamoate  25 mg  07/29/20 16:16 





  Vistaril 25 Mg Capsule  PO  08/28/20 16:15 





  Q8HP PRN  





  ANXIETY  


 


Insulin Human Lispro  0 - 12 unit  07/28/20 16:00  07/30/20 22:12





  Humalog Insulin 100 Unit/1 Ml 3 Ml Vial  SUBCUT  08/27/20 15:59  6 unit





  ACHS MISAEL   Administration





  Protocol  


 


Insulin Human NPH  25 unit  07/29/20 09:00  07/30/20 08:44





  Humulin N (Nph) Insulin 100 Unit/1 Ml 3 Ml  SUBCUT  08/28/20 08:59  25 unit





  QAM MISAEL   Administration


 


Insulin Human NPH  20 unit  07/29/20 22:00  07/30/20 22:13





  Humulin N (Nph) Insulin 100 Unit/1 Ml 3 Ml  SUBCUT  08/28/20 21:59  20 unit





  QHS MISAEL   Administration


 


Levothyroxine Sodium  0.075 mg  07/29/20 08:30  07/31/20 05:45





  Synthroid 0.075 Mg Tablet  PO  08/28/20 08:29  0.075 mg





  MoTuWeThFr@0600 MISAEL   Administration


 


Levothyroxine Sodium  0.088 mg  08/01/20 06:00 





  Synthroid 0.088 Mg Tablet  PO  08/31/20 05:59 





  SuSa@0600 [facility]  


 


Lisinopril  20 mg  07/29/20 10:00  07/30/20 09:43





  Prinivil 10 Mg Tablet  PO  08/28/20 09:59  20 mg





  DAILY MISAEL   Administration


 


Melatonin  6 mg  07/27/20 22:00  07/30/20 22:11





  Melatonin 3 Mg Tablet  PO  08/26/20 21:59  6 mg





  QHS [facility]   Administration


 


Metoprolol Tartrate  25 mg  07/30/20 12:00  07/31/20 05:45





  Lopressor 25 Mg Tablet  PO  08/29/20 11:59  25 mg





  Q6 MISAEL   Administration


 


Multivitamins  1 tab  07/29/20 10:00  07/30/20 09:41





  Tab-A-Minna (Multiple Vitamin) Tablet  PO  08/28/20 09:59  1 tab





  DAILY [facility]   Administration


 


Pantoprazole Sodium  40 mg  07/29/20 17:00  07/31/20 05:45





  Protonix 40 Mg Dr Tablet  PO  08/28/20 16:59  40 mg





  BID@0600,1700 [facility]   Administration


 


Prednisone  60 mg  07/31/20 10:00 





  Deltasone 20 Mg Tablet  PO  08/30/20 09:59 





  DAILY [facility]  


 


Promethazine HCl  12.5 mg  07/27/20 19:19  07/31/20 05:55





  Phenergan Inj 25 Mg/1 Ml Vial  IV  08/26/20 19:18  12.5 mg





  Q4HP PRN   Administration





  FOR NAUSEA/VOMITING  


 


Sodium Chloride  2.5 ml  07/27/20 22:00  07/31/20 05:58





  Saline Flush 2.5 Ml Monoject Prefil Syrin  IV  08/26/20 21:59  Not Given





  Q8 MISAEL  


 


Zinc Sulfate  220 mg  07/28/20 10:00  07/30/20 09:41





  Zinc-220 Capsule  PO  08/27/20 09:59  220 mg





  DAILY MISAEL   Administration


 


Zolpidem Tartrate  5 mg  07/29/20 16:16 





  Ambien 5 Mg Tablet  PO  08/05/20 16:15 





  HSP PRN  





  SLEEP OR INSOMNIA  














Discontinued Medications














Generic Name Dose Route Start Last Admin





  Trade Name Kristopher  PRN Reason Stop Dose Admin


 


Albuterol  2.5 mg  07/27/20 19:19 





  Ventolin 0.083% Neb 2.5 Mg/3 Ml Ampul  NEB  08/26/20 19:18 





  RTQ4HP PRN  





  SHORTNESS OF BREATH  


 


Amlodipine Besylate  5 mg  07/29/20 10:00 





  Norvasc 5 Mg Tablet  PO  08/28/20 09:59 





  DAILY MISAEL  


 


Aspirin  81 mg  07/27/20 22:00  07/27/20 22:16





  Ecotrin 81 Mg Ec Tablet  PO  07/27/20 23:00  81 mg





  QHS MISAEL   Administration


 


Atenolol  100 mg  07/29/20 10:00  07/30/20 09:42





  Tenormin 50 Mg Tablet  PO  08/28/20 09:59  100 mg





  DAILY MISAEL   Administration


 


Atorvastatin Calcium  40 mg  07/27/20 22:00  07/27/20 22:28





  Lipitor 40 Mg Tablet  PO  07/27/20 23:00  40 mg





  QHS MISAEL   Administration


 


Azithromycin  500 mg  07/27/20 18:01  07/27/20 18:35





  Zithromax 250 Mg Tablet  PO  07/27/20 18:02  500 mg





  NOW ONE   Administration


 


Dexamethasone Sodium Phosphate  2 mg  07/27/20 22:00  07/29/20 13:35





  Decadron Inj 4 Mg/Ml Vial  IV  08/26/20 21:59  2 mg





  Q8 MISAEL   Administration


 


Dexamethasone Sodium Phosphate  2 mg  07/29/20 22:00  07/30/20 09:44





  Decadron Inj 4 Mg/Ml Vial  IV  08/28/20 21:59  2 mg





  Q12 MISAEL   Administration


 


Diltiazem HCl  20 mg  07/28/20 11:21  07/28/20 11:44





  Cardizem Inj 25 Mg/5 Ml Vial  IV  07/28/20 11:22  20 mg





  NOW ONE   Administration


 


Diltiazem HCl  20 mg  07/30/20 11:30  07/30/20 12:08





  Cardizem Inj 25 Mg/5 Ml Vial  IV  07/30/20 11:31  Not Given





  NOW ONE  


 


Diltiazem HCl  25 mg  07/31/20 07:00  07/31/20 06:59





  Cardizem Inj 25 Mg/5 Ml Vial  IV  07/31/20 07:01  25 mg





  NOW ONE   Administration


 


Diltiazem HCl  Confirm  07/31/20 06:53 





  Cardizem Inj 25 Mg/5 Ml Vial  Administered  07/31/20 06:54 





  Dose  





  25 mg  





  .ROUTE  





  .STK-MED ONE  


 


Sodium Chloride  1,000 mls @ 0 mls/hr  07/27/20 18:01  07/27/20 19:36





  Nacl 0.9% 1000 Ml Iv Soln  IV  07/27/20 18:02  Infused





  BOLUS ONE   Infusion





  [facility]  


 


Ceftriaxone Sodium/Dextrose  2 gm in 50 mls @ 100 mls/hr  07/27/20 18:01  

07/27/20 19:07





  Rocephin Rtu 2 Gm/D5w 50 Ml Premix Bag  IV  07/27/20 18:30  Infused





  NOW ONE   Infusion


 


Ceftriaxone Sodium/Dextrose  1 gm in 50 mls @ 100 mls/hr  07/28/20 10:00  

07/29/20 10:49





  Rocephin Rtu 1 Gm/D5w 50 Ml Premix  IV  08/04/20 09:59  Infused





  DAILY MISAEL   Infusion


 


Insulin Human Lispro  16 unit  07/28/20 12:30  07/28/20 12:24





  Humalog Insulin 100 Unit/1 Ml 3 Ml Vial  SUBCUT  07/28/20 12:31  16 unit





  NOW ONE   Administration


 


Insulin Human Lispro  Confirm  07/28/20 22:21  07/28/20 22:27





  Humalog Insulin 100 Unit/1 Ml 3 Ml Vial  Administered  07/28/20 22:22  Not 

Given





  Dose  





  1 unit  





  .ROUTE  





  .STK-MED ONE  


 


Insulin Human Lispro  15 unit  07/28/20 22:30  07/28/20 22:38





  Humalog Insulin 100 Unit/1 Ml 3 Ml Vial  SUBCUT  07/28/20 22:31  15 unit





  NOW ONE   Administration


 


Insulin Human Lispro  20 unit  07/29/20 13:30  07/29/20 13:34





  Humalog Insulin 100 Unit/1 Ml 3 Ml Vial  SUBCUT  07/29/20 13:31  20 unit





  NOW ONE   Administration


 


Insulin Human Lispro  20 unit  07/29/20 14:00  07/29/20 13:41





  Humalog Insulin 100 Unit/1 Ml 3 Ml Vial  SUBCUT  07/29/20 14:01  20 unit





  NOW ONE   Administration


 


Insulin Human NPH  20 unit  07/29/20 18:00 





  Humulin N (Nph) Insulin 100 Unit/1 Ml 3 Ml  SUBCUT  08/28/20 17:59 





  QPM [facility]  


 


Insulin Human Regular  0 - 12 unit  07/27/20 22:00  07/28/20 11:37





  Humulin R (Pyxis) Insulin 100 Unit/Ml 3ml  SUBCUT  08/26/20 21:59  Not Given





  ACHS [facility]  





  Protocol  


 


Insulin Human Regular  4 unit  07/28/20 10:30  07/28/20 09:45





  Humulin R (Pyxis) Insulin 100 Unit/Ml 3ml  SUBCUT  07/28/20 10:31  4 unit





  NOW ONE   Administration


 


Levothyroxine Sodium  0.075 mg  07/29/20 10:00 





  Synthroid 0.075 Mg Tablet  PO  08/28/20 09:59 





  MOTUWETHFR@1000 [facility]  


 


Levothyroxine Sodium  0.088 mg  08/01/20 10:00 





  Synthroid 0.088 Mg Tablet  PO  08/31/20 09:59 





  SUSA@1000 [facility]  


 


Lisinopril  10 mg  07/27/20 22:00  07/27/20 22:31





  Prinivil 10 Mg Tablet  PO  07/27/20 23:00  10 mg





  DAILY MISAEL   Administration


 


Metoprolol Tartrate  Confirm  07/31/20 06:13  07/31/20 06:18





  Lopressor Inj/Pf 5 Mg/5 Ml Sdv  Administered  07/31/20 06:14  5 mg





  Dose   Administration





  5 mg  





  IV  





  .STK-MED ONE  


 


Metoprolol Tartrate  5 mg  07/31/20 06:45 





  Lopressor Inj/Pf 5 Mg/5 Ml Sdv  IV  07/31/20 06:46 





  NOW ONE  














Assessment & Plan





- Diagnosis


(1) Atrial fibrillation with RVR


Is this a current diagnosis for this admission?: Yes   


Plan: 


Likely secondary to her history of coronary artery disease now complicated by 

her suspected COVID-19 infection.  She has only received 4 doses of Lopressor 

and, although her pulse not at goal, it is much improved.





Recommendations:


-Continue with current medical management for now and uptitrate Lopressor as 

needed to keep heart rate less than 120 bpm with physical activities.


-Continue with anticoagulation.


-Echocardiogram to assess for structural heart disease.


-We will continue to follow with you.








(2) Coronary artery disease


Qualifiers: 


   Coronary Disease-Associated Artery/Lesion type: native artery   Native vs. 

transplanted heart: native heart   Associated angina: without angina   Qualified

Code(s): I25.10 - Atherosclerotic heart disease of native coronary artery 

without angina pectoris   


Is this a current diagnosis for this admission?: Yes   


Plan: 


The patient underwent stenting of the proximal right coronary artery in May 

2002.  She is currently free of ischemic symptoms however she does have lower 

extremity edema which she attributes to increased doses of Norvasc however I am 

concerned about the possibility of mild fluid overload as her fluid balance is 

almost 5 L positive although her urinary output has not been consistently 

measured.  Her chest x-ray this morning does not show overt fluid overload.  For

unclear reasons to me she is on hydralazine which is not the best 

antihypertensive for patient with coronary artery disease.  Her statin therapy 

is also low intensity.





Recommendations:


-Echocardiogram to assess for structural heart disease.


-Discontinue hydralazine.


-Uptitrate lisinopril as indicated to keep systolic blood pressure to 130/80 or 

below.


-Strict intake and output.


-Minimize IV fluids.


-Low sodium/low cholesterol diet.


-Increase Lipitor to 80 mg daily.


-We will continue to follow-up with you.














(3) Hypertension


Qualifiers: 


   Hypertension type: essential hypertension   Qualified Code(s): I10 - 

Essential (primary) hypertension   


Is this a current diagnosis for this admission?: Yes   


Plan: 


Please see #2 above for recommendations.

## 2020-07-31 NOTE — PROGRESS NOTE
Provider Note


Provider Note: 





I was asked by the hospitalist service to perform ultrasound of the lung to 

evaluate patient's pulmonary status.  She has been having intermittent atrial 

fibrillation.  Some of her labs including d-dimer were elevated and there was 

concern for possible COVID despite negative testing x2.  Patient had had an 

episode of shortness of breath during atrial fibrillation.


On bedside ultrasound patient appeared comfortable.  She was breathing 

comfortably not requiring oxygen.  She is able to walk around the room.  While 

patient was sitting ultrasound using high-frequency and low-frequency probes was

done.  There was an occasional "B" line noticeably on the right but there were 

strong and significant A-line's.  There certainly was no light be scientifically

seen in COVID cases.  There was an occasional undulation in the pleural line 

with deep breath but none significant. 


Lung ultrasound is sensitive for interstitial changes and distinct findings for 

COVID were not seen.  Although sensitivity is high findings are neither specific

or directly correlated with SARS COVID-19.


Chest x-ray does not show any groundglass appearance and CT scan done on 

presentation did not show the typical subpleural basilar changes we have been 

seen in COVID.


I have recommended a follow-up CT scan.  I would expect to see possible 

interstitial changes related to pulmonary edema in these at times may appear to 

be groundglass.  Given how well the patient looks and positive findings on 

ultrasound I do not feel that this is SARSCOVID 19 related process.


Her hyponatremia may be related to her antidepressant medicine and her 

hydrochlorothiazide.  That being said her urine sodium is indicative of a non-SI

ADH state.  A low urine sodium with someone with atrial fibrillation may be 

indicative of hypovolemia that would need to be considered.





Please let us know if we can be of any further help or service.

## 2020-07-31 NOTE — PDOC PROGRESS REPORT
Subjective


Progress Note for:: 07/31/20


Subjective:: 


Patient was seen on morning rounds and again this afternoon.  She is found 

ambulating in her room, comfortably, on room air.  She has dyspnea again, but is

maintaining oxygen saturations on room air.  Rhythm aware and currently feeling 

palpitations.  She also complains of fatigue and intermittent diaphoresis.


She denies fever, chills, chest pain, abdominal pain, nausea, vomiting.


She has no other questions or concerns at this time.


Nursing reports low-grade temp of 100.9 today.  Continues to have atrial 

fibrillation RVR with heart rate 120-140.


Reason For Visit: 


COPD EXACERBATION,CORONARY ARTERY DISEASE,HYPERTEN








Physical Exam


Vital Signs: 


                                        











Temp Pulse Resp BP Pulse Ox


 


 98.0 F   123 H  24 H  121/72   98 


 


 07/31/20 16:39  07/31/20 16:39  07/31/20 16:39  07/31/20 16:39  07/31/20 16:39








                                 Intake & Output











 07/30/20 07/31/20 08/01/20





 06:59 06:59 06:59


 


Intake Total 1470 2001 520


 


Balance 1470 2001 520


 


Weight  74.2 kg 











General appearance: PRESENT: no acute distress, cooperative, well-developed, 

well-nourished


Head exam: PRESENT: atraumatic, normocephalic


Eye exam: PRESENT: conjunctiva pink, EOMI, PERRLA.  ABSENT: scleral icterus


Mouth exam: PRESENT: moist, tongue midline


Teeth exam: PRESENT: poor dentation


Respiratory exam: PRESENT: clear to auscultation steven, symmetrical, unlabored.  

ABSENT: rales, rhonchi, wheezes


Cardiovascular exam: PRESENT: irregular rhythm, tachycardia.  ABSENT: diastolic 

murmur, rubs, systolic murmur


Pulses: PRESENT: normal dorsalis pedis pul


Vascular exam: PRESENT: normal capillary refill


GI/Abdominal exam: PRESENT: normal bowel sounds, soft.  ABSENT: distended, 

guarding, mass, organolmegaly, rebound, tenderness


Rectal exam: PRESENT: deferred


Extremities exam: PRESENT: full ROM.  ABSENT: calf tenderness, clubbing, pedal 

edema


Musculoskeletal exam: PRESENT: ambulatory


Neurological exam: PRESENT: alert, awake, oriented to person, oriented to place,

oriented to time, oriented to situation, CN II-XII grossly intact.  ABSENT: 

motor sensory deficit


Psychiatric exam: PRESENT: anxious, appropriate affect, normal mood.  ABSENT: 

homicidal ideation, suicidal ideation


Skin exam: PRESENT: dry, intact, warm.  ABSENT: cyanosis, rash





Results


Laboratory Results: 


                                        





                                 07/31/20 05:47 





                                 07/31/20 05:47 





                                        











  07/31/20 07/31/20





  05:47 05:47


 


WBC   8.2


 


RBC   3.94


 


Hgb   11.2 L


 


Hct   32.6 L


 


MCV   83


 


MCH   28.5


 


MCHC   34.5


 


RDW   15.0 H


 


Plt Count   202


 


Sodium  130.8 L 


 


Potassium  4.3 


 


Chloride  96 L 


 


Carbon Dioxide  25 


 


Anion Gap  10 


 


BUN  23 H 


 


Creatinine  0.90 


 


Est GFR (African Amer)  > 60 


 


Glucose  110 


 


Calcium  9.1 











Impressions: 


                                        





Chest/Abdomen CTA  07/27/20 18:00


IMPRESSION: 


 


Mild emphysema.


 








Chest X-Ray  07/31/20 00:00


IMPRESSION:  No interval change in the chest.


 














Assessment and Plan





- Diagnosis


(1) Atrial fibrillation with RVR


Is this a current diagnosis for this admission?: Yes   


Plan: 


Atrial fibrillation with RVR.





Cardiology was consulted; discussed with Dr. Haro.


He advises to discontinue atenolol.


Increase to Toprol 50 mg every 6 hours.


Continue anticoagulation; plan for discharge on Eliquis.








(2) COVID-19 determined by clinical diagnostic criteria


Is this a current diagnosis for this admission?: Yes   


Plan: 


Continue to have a suspicion for COVID-19 virus infection 


Rapid and send out COVID-19 tests are negative.


However, she has numerous laboratory results supportive of COVID-19 (elevated d-

dimer, transaminases, CRP, LDH, ferritin) that can not be explained otherwise.


CTA negative for pulmonary embolus and pneumonia.


COPD exacerbation is not suspected (no wheezing on exam) and certainly would not

be the cause of the above-mentioned laboratory changes.





D-dimer improved (1.47)


CRP equivocal (132.7


Ferritin increased (581)


LDH increased (289)





Patient is admitted to medical floor and continued cardiac telemetry.


Due to elevated d-dimer, she is placed on full dose Lovenox.  


Received 3 days of Rocephin


Completed 5 day course of Azithromycin.


Transitioned to p.o. prednisone.


Continue vitamin C, vitamin D, zinc, and melatonin supplementation.


Encourage pulmonary toilet.


Contact and droplet precautions.





Discussed with Dr. Farley today.  Patient and family with, understandable, high

level of frustration/anxiety regarding unclear dx.  Dr. Farley provided bedside

eval w/ US.  B lines noted; recommends follow up CT Chest to evaluate for ground

glass changes.  If negative, then in conjunction w/ US can effectively r/o 

COVID.


CT Chest pending.








(3) COPD (chronic obstructive pulmonary disease) with chronic bronchitis


Is this a current diagnosis for this admission?: Yes   


Plan: 


Stable and without exacerbation at this time.


The patient reports COPD.  She states that she has not had an exacerbation in a 

long time.  She does have an albuterol rescue inhaler at home but has been not 

on any chronic daily inhaler therapy.  She does not use oxygen at home.








(4) Coronary artery disease


Qualifiers: 


   Coronary Disease-Associated Artery/Lesion type: native artery   Native vs. 

transplanted heart: native heart   Associated angina: without angina   Qualified

Code(s): I25.10 - Atherosclerotic heart disease of native coronary artery 

without angina pectoris   


Is this a current diagnosis for this admission?: Yes   


Plan: 


The patient states that she had a stent placed approximately 10 years ago or 

more.  She was following with cardiology but then decided to change 

practitioners.  She states she would be seeing Dr. Uribe at [facility] 

to establish with new cardiology. 





Resume aspirin and statin therapy.


Resume a home antihypertensive regiment of amlodipine, lisinopril/HCTZ, 

bisprolol, and hydralazine.


Cardiac diet.








(5) Depression


Qualifiers: 


   Depression Type: unspecified   Qualified Code(s): F32.9 - Major depressive 

disorder, single episode, unspecified   


Is this a current diagnosis for this admission?: Yes   


Plan: 


Home dose Celexa.


Monitor QTC while given concurrently with azithromycin.


Vistaril as needed anxiety








(6) Diabetes mellitus


Qualifiers: 


   Diabetes mellitus type: type 1   Diabetes mellitus complication status: 

without complication   Qualified Code(s): E10.9 - Type 1 diabetes mellitus 

without complications   


Is this a current diagnosis for this admission?: Yes   


Plan: 


A1c 6.4%.


Resume home dose NPH.


Accu-Cheks before meals and at bedtime with Humalog per sliding scale coverage.


Hypoglycemia protocol.


Cardiac/consistent carb diet.








(7) Elevated d-dimer


Is this a current diagnosis for this admission?: Yes   


Plan: 


CTA chest negative for pulmonary embolus.


Improving; d-dimer down to 1.47





Because there is a suspicion of Covid-19 and her d-dimer is greater than 1.0 she

will be placed on full anticoagulant therapy with Lovenox 1 mg/kg every 12 

hours.








(8) Elevated liver function tests


Is this a current diagnosis for this admission?: Yes   


Plan: 


Resolved.


She reports rare to occasional glasses of wine.  No other indication for 

elevated transaminases.  





Transaminitis is 1 of the abnormalities seen in COVID.  


Continue to monitor.








(9) Fever


Qualifiers: 


   Fever type: unspecified   Qualified Code(s): R50.9 - Fever, unspecified   


Is this a current diagnosis for this admission?: Yes   


Plan: 


Fever along with some of her symptoms and laboratory studies certainly create a 

high index of suspicion for Covid-19. 


Afebrile x48 hrs.





Acetaminophen for elevated temperature and antibiotics as well as other 

recommended COVID treatments until the serology returns


Blood cultures (1/4 bottles) shows coag negative staph; contaminant.


Antibiotics as above.








(10) Hypertension


Qualifiers: 


   Hypertension type: essential hypertension   Qualified Code(s): I10 - 

Essential (primary) hypertension   


Is this a current diagnosis for this admission?: Yes   


Plan: 


Resume a home antihypertensive regiment of lisinopril/HCTZ, bisprolol, and 

hydralazine.


Increased amlodipine to 10 mg daily.


Cardiac diet.








(11) Hyponatremia


Is this a current diagnosis for this admission?: Yes   


Plan: 


Chronic per patient, however, labs from January 2020 show sodium 134.8; 

therefore, her hyponatremia today is significantly lower than what is presumed 

to be her baseline.


Sodium trending overall stable; 125.3-> 122.5-> 125.1





Liberalize dietary sodium.


Fluid restrict 1.2 L/daily


Follow up chemistry.








(12) Hypothyroidism, postsurgical


Is this a current diagnosis for this admission?: Yes   


Plan: 


Continue home dose levothyroxine.








(13) Suspected COVID-19 virus infection


Is this a current diagnosis for this admission?: Yes   


Plan: 


As above.








- Time


Time Spent with patient: 35 or more minutes


Medications reviewed and adjusted accordingly: Yes


Anticipated Discharge Disposition: Home, Self Care


Anticipated Discharge Timeframe: within 48 hours

## 2020-08-01 LAB
ANION GAP SERPL CALC-SCNC: 7 MMOL/L (ref 5–19)
APPEARANCE UR: CLEAR
APTT PPP: YELLOW S
BILIRUB UR QL STRIP: NEGATIVE
BUN SERPL-MCNC: 30 MG/DL (ref 7–20)
CALCIUM: 8.7 MG/DL (ref 8.4–10.2)
CHLORIDE SERPL-SCNC: 98 MMOL/L (ref 98–107)
CO2 SERPL-SCNC: 27 MMOL/L (ref 22–30)
GLUCOSE SERPL-MCNC: 95 MG/DL (ref 75–110)
GLUCOSE UR STRIP-MCNC: 50 MG/DL
KETONES UR STRIP-MCNC: NEGATIVE MG/DL
NITRITE UR QL STRIP: NEGATIVE
PH UR STRIP: 6 [PH] (ref 5–9)
POTASSIUM SERPL-SCNC: 4 MMOL/L (ref 3.6–5)
PROT UR STRIP-MCNC: 100 MG/DL
SP GR UR STRIP: 1.04
UROBILINOGEN UR-MCNC: NEGATIVE MG/DL (ref ?–2)

## 2020-08-01 RX ADMIN — INSULIN LISPRO SCH UNIT: 100 INJECTION, SOLUTION INTRAVENOUS; SUBCUTANEOUS at 22:09

## 2020-08-01 RX ADMIN — Medication SCH: at 13:05

## 2020-08-01 RX ADMIN — Medication SCH ML: at 22:12

## 2020-08-01 RX ADMIN — METOPROLOL TARTRATE SCH MG: 50 TABLET, FILM COATED ORAL at 17:09

## 2020-08-01 RX ADMIN — PANTOPRAZOLE SODIUM SCH MG: 40 TABLET, DELAYED RELEASE ORAL at 17:08

## 2020-08-01 RX ADMIN — VITAMIN D, TAB 1000IU (100/BT) SCH UNIT: 25 TAB at 09:15

## 2020-08-01 RX ADMIN — INSULIN LISPRO SCH: 100 INJECTION, SOLUTION INTRAVENOUS; SUBCUTANEOUS at 09:08

## 2020-08-01 RX ADMIN — INSULIN HUMAN SCH UNIT: 100 INJECTION, SUSPENSION SUBCUTANEOUS at 22:10

## 2020-08-01 RX ADMIN — OXYCODONE HYDROCHLORIDE AND ACETAMINOPHEN SCH MG: 500 TABLET ORAL at 09:15

## 2020-08-01 RX ADMIN — Medication SCH TAB: at 22:09

## 2020-08-01 RX ADMIN — INSULIN LISPRO SCH UNIT: 100 INJECTION, SOLUTION INTRAVENOUS; SUBCUTANEOUS at 12:11

## 2020-08-01 RX ADMIN — APIXABAN SCH MG: 5 TABLET, FILM COATED ORAL at 17:09

## 2020-08-01 RX ADMIN — ASPIRIN SCH MG: 81 TABLET, COATED ORAL at 09:15

## 2020-08-01 RX ADMIN — MULTIVITAMIN TABLET SCH TAB: TABLET at 09:15

## 2020-08-01 RX ADMIN — METOPROLOL TARTRATE SCH MG: 50 TABLET, FILM COATED ORAL at 10:59

## 2020-08-01 RX ADMIN — ATORVASTATIN CALCIUM SCH MG: 20 TABLET, FILM COATED ORAL at 22:08

## 2020-08-01 RX ADMIN — Medication SCH MG: at 09:15

## 2020-08-01 RX ADMIN — Medication SCH ML: at 05:39

## 2020-08-01 RX ADMIN — CITALOPRAM HYDROBROMIDE SCH MG: 20 TABLET ORAL at 09:16

## 2020-08-01 RX ADMIN — MELATONIN SCH MG: 3 TAB ORAL at 22:09

## 2020-08-01 RX ADMIN — LEVOTHYROXINE SODIUM SCH MG: 88 TABLET ORAL at 05:45

## 2020-08-01 RX ADMIN — INSULIN HUMAN SCH UNIT: 100 INJECTION, SUSPENSION SUBCUTANEOUS at 09:14

## 2020-08-01 RX ADMIN — ENOXAPARIN SODIUM SCH MG: 80 INJECTION SUBCUTANEOUS at 09:13

## 2020-08-01 RX ADMIN — PANTOPRAZOLE SODIUM SCH MG: 40 TABLET, DELAYED RELEASE ORAL at 05:37

## 2020-08-01 RX ADMIN — METOPROLOL TARTRATE SCH MG: 50 TABLET, FILM COATED ORAL at 05:37

## 2020-08-01 RX ADMIN — PREDNISONE SCH MG: 20 TABLET ORAL at 09:15

## 2020-08-01 RX ADMIN — AMLODIPINE BESYLATE SCH: 10 TABLET ORAL at 09:20

## 2020-08-01 RX ADMIN — FOLIC ACID SCH MG: 1 TABLET ORAL at 09:15

## 2020-08-01 RX ADMIN — OXYCODONE HYDROCHLORIDE AND ACETAMINOPHEN SCH MG: 500 TABLET ORAL at 17:09

## 2020-08-01 RX ADMIN — LISINOPRIL SCH MG: 10 TABLET ORAL at 09:15

## 2020-08-01 RX ADMIN — INSULIN LISPRO SCH: 100 INJECTION, SOLUTION INTRAVENOUS; SUBCUTANEOUS at 17:06

## 2020-08-01 RX ADMIN — METOPROLOL TARTRATE SCH MG: 50 TABLET, FILM COATED ORAL at 23:28

## 2020-08-01 RX ADMIN — HYDROCHLOROTHIAZIDE SCH MG: 12.5 CAPSULE ORAL at 09:14

## 2020-08-01 NOTE — PDOC PROGRESS REPORT
Subjective


Progress Note for:: 08/01/20


Subjective:: 





NADEEN BARRIOS is a 64 year old female with a history of coronary artery 

disease status post PCI to the proximal RCA with a 4.0 mm x 18 mm Elite stent in

May 2002, palpitations, PACs in the past, COPD, hyperlipidemia, hypertension, 

depression, diabetes mellitus and hyponatremia who was admitted to [facility] on 

07/27/2020 with several weeks of increasing shortness of breath and cough as 

well as fevers and who is consulted to our service for rapid atrial 

fibrillation.  The hospitalist service has been treating her for COVID-19 as she

clinically is having all symptoms and signs of COVID infection despite negative 

testing.  The patient had been responding appropriately to treatment for her 

pulmonary condition however began to have episodes of paroxysmal atrial fibri

llation recently and, as of yesterday, had remained in rapid atrial 

fibrillation.  She was begun on Lopressor 25 p.o. every 6 with some improvement 

in her pulse.  This morning she is found in bed, resting comfortably although 

she complains of shortness of breath and racing heart.  She feels hot to the 

touch.





08/01/2020:


The patient feels 100% better this morning when compared to yesterday and has no

cardiovascular complaints.  She underwent CT scan of the chest as well as lung 

ultrasound yesterday all of which found no evidence of COVID infection.  Her 

beta-blocker was increased to 50 mg p.o. every 6 hours with a significant 

improvement in her heart rate.





Physical exam on 08/01/2020:


GENERAL:  Pleasant and conversational.  Oriented x3 with normal mood.  Not in 

acute distress however she is speaking in short sentences and appears to be 

short of breath.


HEENT:  Normocephalic, atraumatic.  Pupils equal.   Sclerae anicteric.  

Oropharynx moist. 


NECK:  No JVD.  No carotid bruits. 


LUNGS:  Clear to auscultation bilaterally, decreased breath sounds bilaterally. 

Normal respiratory effort without the use of accessory muscles or intercostal 

retractions.  


CARDIOVASCULAR: Irregular rate and rhythm, normal S1 and S2 without murmurs, 

rubs, or gallops.  PMI not displaced.


ABDOMEN: No masses or tenderness to palpation.  No bruit.  No splenomegaly or 

hepatomegaly. No abdominal aorta bruit noted.


EXTREMITIES: 1+ pitting edema bilaterally, no cyanosis, no clubbing.  +2 pulses 

femoral and pedal pulses bilaterally.  


SKIN: No lesions or rashes.


MUSCULOSKELETAL:  No chest tenderness to palpation. 


NEUROLOGIC: Nonfocal.  No gross sensory or motor deficits bilateral upper or 

lower extremities.





Reason For Visit: 


COPD EXACERBATION,CORONARY ARTERY DISEASE,HYPERTEN








Physical Exam


Vital Signs: 


                                        











Temp Pulse Resp BP Pulse Ox


 


 97.8 F   81   19   116/76   97 


 


 08/01/20 04:24  08/01/20 04:24  08/01/20 04:24  08/01/20 04:24  08/01/20 04:24








                                 Intake & Output











 07/31/20 08/01/20 08/02/20





 06:59 06:59 06:59


 


Intake Total 2001 520 


 


Output Total  200 


 


Balance 2001 320 


 


Weight 74.2 kg  














Results


Laboratory Results: 


                                        





                                 07/31/20 05:47 





                                 08/01/20 05:32 





                                        











  08/01/20 08/01/20





  03:30 05:32


 


Sodium   132.2 L


 


Potassium   4.0


 


Chloride   98


 


Carbon Dioxide   27


 


Anion Gap   7


 


BUN   30 H


 


Creatinine   0.96


 


Est GFR (African Amer)   > 60


 


Glucose   95


 


Calcium   8.7


 


Urine Color  YELLOW 


 


Urine Appearance  CLEAR 


 


Urine pH  6.0 


 


Ur Specific Gravity  1.038 


 


Urine Protein  100 H 


 


Urine Glucose (UA)  50 H 


 


Urine Ketones  NEGATIVE 


 


Urine Blood  NEGATIVE 


 


Urine Nitrite  NEGATIVE 


 


Ur Leukocyte Esterase  NEGATIVE 


 


Urine WBC (Auto)  1 


 


Urine RBC (Auto)  1 











Impressions: 


                                        





Chest/Abdomen CTA  07/27/20 18:00


IMPRESSION: 


 


Mild emphysema.


 








Chest CT  07/31/20 00:00


IMPRESSION:


 


No acute abnormality within the chest.


 


Subacute fracture involving the left anterior fifth rib.


 


3.0 mm solid pulmonary nodule within the upper lobe. If patient


is low risk for malignancy, no routine follow-up imaging is


recommended; if patient is high risk for malignancy, a


non-contrast Chest CT at 12 months is optional. If performed and


the nodule is stable at 12 months, no further follow-up is


recommended.


These guidelines do not apply to immunocompromised patients and


patients with cancer. Follow up in patients with significant


comorbidities as clinically warranted. For lung cancer screening,


adhere to Lung-RADS guidelines. Reference: Radiology. 2017;


284(1):228-43.


 


Splenic varices with splenorenal shunt.


 


TECHNICAL DOCUMENTATION:


 


Quality ID # [id number]: Final reports with documentation of one or more


dose reduction techniques (e.g., Automated exposure control,


adjustment of the mA and/or kV according to patient size, use of


iterative reconstruction technique)


 


copyright 2011 [employer]- All Rights Reserved


 








Chest X-Ray  07/31/20 00:00


IMPRESSION:  No interval change in the chest.


 








                                        





                                 07/31/20 05:47 





                                 08/01/20 05:32 





                                        











MCV  83 fl (80-97)   07/31/20  05:47    


 


MCH  28.5 pg (27.0-33.4)   07/31/20  05:47    


 


MCHC  34.5 g/dL (32.0-36.0)   07/31/20  05:47    


 


RDW  15.0 % (11.5-14.0)  H  07/31/20  05:47    


 


Seg Neutrophils %  86.0 % (42-78)  H  07/28/20  05:06    


 


Chloride  98 mmol/L ()   08/01/20  05:32    


 


Carbon Dioxide  27 mmol/L (22-30)   08/01/20  05:32    


 


Anion Gap  7  (5-19)   08/01/20  05:32    


 


Est GFR ( Amer)  > 60  (>60)   08/01/20  05:32    


 


Est GFR (Non-Af Amer)  Cancelled   07/27/20  15:01    


 


Glucose  95 mg/dL ()   08/01/20  05:32    


 


Calcium  8.7 mg/dL (8.4-10.2)   08/01/20  05:32    


 


Phosphorus  3.2 mg/dL (2.5-4.5)   07/28/20  05:06    


 


Magnesium  1.5 mg/dL (1.6-2.3)  L  07/28/20  05:06    


 


Ferritin  581.00 ng/mL (11.1-264.0)  H  07/29/20  05:14    


 


Total Bilirubin  0.6 mg/dL (0.2-1.3)   07/27/20  16:25    


 


AST  84 U/L (14-36)  H  07/27/20  16:25    


 


Alkaline Phosphatase  69 U/L ()   07/27/20  16:25    


 


C-Reactive Protein  132.7 mg/L (<10.0)  H  07/29/20  05:14    


 


Total Protein  7.9 g/dL (6.3-8.2)   07/27/20  16:25    


 


Albumin  4.4 g/dL (3.5-5.0)   07/27/20  16:25    


 


Triglycerides  105 mg/dL (<150)   07/28/20  05:06    


 


Cholesterol  122.99 mg/dL (0-200)   07/28/20  05:06    


 


LDL Cholesterol Direct  46 mg/dL (<100)   07/28/20  05:06    


 


VLDL Cholesterol  21.0 mg/dL (10-31)   07/28/20  05:06    


 


HDL Cholesterol  52 mg/dL (>40)   07/28/20  05:06    


 


TSH  0.13 uIU/mL (0.47-4.68)  L  07/28/20  05:06    


 


Free T4  1.45 ng/dL (0.78-2.19)   07/28/20  05:06    


 


Free T3 pg/mL  1.81 pg/mL (2.77-5.27)  L  07/28/20  05:06    


 


Urine Color  YELLOW   08/01/20  03:30    


 


Urine Appearance  CLEAR   08/01/20  03:30    


 


Urine pH  6.0  (5.0-9.0)   08/01/20  03:30    


 


Ur Specific Gravity  1.038   08/01/20  03:30    


 


Urine Protein  100 mg/dL (NEGATIVE)  H  08/01/20  03:30    


 


Urine Glucose (UA)  50 mg/dL (NEGATIVE)  H  08/01/20  03:30    


 


Urine Ketones  NEGATIVE mg/dL (NEGATIVE)   08/01/20  03:30    


 


Urine Blood  NEGATIVE  (NEGATIVE)   08/01/20  03:30    


 


Urine Nitrite  NEGATIVE  (NEGATIVE)   08/01/20  03:30    


 


Ur Leukocyte Esterase  NEGATIVE  (NEGATIVE)   08/01/20  03:30    


 


Urine WBC (Auto)  1 /HPF  08/01/20  03:30    


 


Urine RBC (Auto)  1 /HPF  08/01/20  03:30    








                             Current Medication List











Generic Name Dose Route Start Last Admin





  Trade Name Freq  PRN Reason Stop Dose Admin


 


Acetaminophen  650 mg  07/27/20 19:19 





  Tylenol 325 Mg Tablet  PO  08/26/20 19:18 





  Q4HP PRN  





  FOR PAIN OR TEMP  


 


Al Hydrox/Mg Hydrox/Simethicone  15 ml  07/27/20 19:19 





  Maalox Plus Susp 30 Udcup  PO  08/26/20 19:18 





  Q6HP PRN  





  HEARTBURN  


 


Albuterol  2 puff  07/27/20 19:48 





  Proair Hfa Inhalation Aerosol 8.5 Gm Mdi  IH  08/26/20 19:47 





  Q4HP PRN  





  WHEEZING  


 


Amlodipine Besylate  10 mg  07/29/20 10:00  07/31/20 10:00





  Norvasc 10 Mg Tablet  PO  08/28/20 09:59  Not Given





  DAILY MISAEL  


 


Ascorbic Acid  500 mg  07/28/20 10:00  07/31/20 17:03





  Vitamin C 500 Mg Tablet  PO  08/27/20 09:59  500 mg





  BID MISAEL   Administration


 


Aspirin  81 mg  07/29/20 10:00  07/31/20 09:57





  Ecotrin 81 Mg Ec Tablet  PO  08/28/20 09:59  81 mg





  DAILY MISAEL   Administration


 


Atorvastatin Calcium  80 mg  07/31/20 22:00  07/31/20 22:28





  Lipitor 20 Mg Tablet  PO  08/30/20 21:59  80 mg





  QHS MISAEL   Administration


 


Calcium Carbonate  1 tab  07/28/20 22:00  07/31/20 22:28





  Caltrate 600-Vit D3 400 Tablet  PO  08/27/20 21:59  1 tab





  QHS MISAEL   Administration


 


Cetirizine HCl  10 mg  07/28/20 20:25 





  Zyrtec 10 Mg Tablet  PO  08/27/20 20:24 





  HSP PRN  





  FOR ALLERGIES  


 


Cholecalciferol  2,000 unit  07/28/20 10:00  07/31/20 09:57





  Vitamin D3 1000 Unit Tablet  PO  08/27/20 09:59  2,000 unit





  DAILY MISAEL   Administration


 


Citalopram Hydrobromide  10 mg  07/29/20 10:00  07/31/20 09:57





  Celexa 20 Mg Tablet  PO  08/28/20 09:59  10 mg





  DAILY MISAEL   Administration


 


Dextrose  12.5 gm  07/27/20 19:39 





  Dextrose Inj 50% Syringe (25 Gm/50 Ml)  IV  08/26/20 19:38 





  PRN PRN  





  FOR BG 50-69 IN ALERT PATIENT  





  Protocol  


 


Dextrose  25 gm  07/27/20 19:39 





  Dextrose Inj 50% Syringe (25 Gm/50 Ml)  IV  08/26/20 19:38 





  PRN PRN  





  PER PROTOCOL  





  Protocol  


 


Enoxaparin Sodium  80 mg  07/27/20 22:00  07/31/20 22:26





  Lovenox Inj 80 Mg/0.8 Ml Disp.Syrin  SUBCUT  08/26/20 21:59  80 mg





  Q12 MISAEL   Administration


 


Folic Acid  1 mg  07/29/20 10:00  07/31/20 09:57





  Folvite 1 Mg Tablet  PO  08/28/20 09:59  1 mg





  DAILY MISAEL   Administration


 


Glucagon  1 mg  07/27/20 19:39 





  Glucagen Inj 1 Mg Vial  IM  08/26/20 19:38 





  PRN PRN  





  Evaluate for BG < 70  





  Protocol  


 


Glucose  15 gm  07/27/20 19:39 





  Glutose 40% Gel 15 Gm Tube  PO  08/26/20 19:38 





  PRN PRN  





  FOR BG 50-69 IN ALERT PATIENT  





  Protocol  


 


Glucose  30 gm  07/27/20 19:39 





  Glutose 40% Gel 15 Gm Tube  PO  08/26/20 19:38 





  PRN PRN  





  FOR BG < 50 IN ALERT PATIENT   





  Protocol  


 


Hydrochlorothiazide  12.5 mg  07/29/20 10:00  07/31/20 10:00





  Hydrodiuril 12.5 Mg Tablet  PO  08/28/20 09:59  Not Given





  DAILY [facility]  


 


Hydroxyzine Pamoate  25 mg  07/29/20 16:16 





  Vistaril 25 Mg Capsule  PO  08/28/20 16:15 





  Q8HP PRN  





  ANXIETY  


 


Insulin Human Lispro  0 - 12 unit  07/28/20 16:00  07/31/20 22:26





  Humalog Insulin 100 Unit/1 Ml 3 Ml Vial  SUBCUT  08/27/20 15:59  10 unit





  ACHS MISAEL   Administration





  Protocol  


 


Insulin Human NPH  25 unit  07/29/20 09:00  07/31/20 09:58





  Humulin N (Nph) Insulin 100 Unit/1 Ml 3 Ml  SUBCUT  08/28/20 08:59  25 unit





  QAM MISAEL   Administration


 


Insulin Human NPH  20 unit  07/29/20 22:00  07/31/20 22:27





  Humulin N (Nph) Insulin 100 Unit/1 Ml 3 Ml  SUBCUT  08/28/20 21:59  20 unit





  QHS MISAEL   Administration


 


Levothyroxine Sodium  0.075 mg  07/29/20 08:30  07/31/20 05:45





  Synthroid 0.075 Mg Tablet  PO  08/28/20 08:29  0.075 mg





  MoTuWeThFr@0600 MISAEL   Administration


 


Levothyroxine Sodium  0.088 mg  08/01/20 06:00  08/01/20 05:45





  Synthroid 0.088 Mg Tablet  PO  08/31/20 05:59  0.088 mg





  SuSa@0600 MISAEL   Administration


 


Lisinopril  10 mg  08/01/20 10:00 





  Prinivil 10 Mg Tablet  PO  08/31/20 09:59 





  DAILY MISAEL  


 


Melatonin  6 mg  07/27/20 22:00  07/31/20 22:28





  Melatonin 3 Mg Tablet  PO  08/26/20 21:59  6 mg





  QHS MISAEL   Administration


 


Metoprolol Tartrate  50 mg  07/31/20 18:00  08/01/20 05:37





  Lopressor 50 Mg Tablet  PO  08/30/20 17:59  50 mg





  Q6 MISAEL   Administration


 


Multivitamins  1 tab  07/29/20 10:00  07/31/20 09:57





  Tab-A-Minna (Multiple Vitamin) Tablet  PO  08/28/20 09:59  1 tab





  DAILY MISAEL   Administration


 


Pantoprazole Sodium  40 mg  07/29/20 17:00  08/01/20 05:37





  Protonix 40 Mg Dr Tablet  PO  08/28/20 16:59  40 mg





  BID@0600,1700 MISAEL   Administration


 


Prednisone  60 mg  07/31/20 10:00  07/31/20 09:57





  Deltasone 20 Mg Tablet  PO  08/30/20 09:59  60 mg





  DAILY MISAEL   Administration


 


Promethazine HCl  12.5 mg  07/27/20 19:19  07/31/20 05:55





  Phenergan Inj 25 Mg/1 Ml Vial  IV  08/26/20 19:18  12.5 mg





  Q4HP PRN   Administration





  FOR NAUSEA/VOMITING  


 


Sodium Chloride  2.5 ml  07/27/20 22:00  08/01/20 05:39





  Saline Flush 2.5 Ml Monoject Prefil Syrin  IV  08/26/20 21:59  2.5 ml





  Q8 MISAEL   Administration


 


Zinc Sulfate  220 mg  07/28/20 10:00  07/31/20 09:57





  Zinc-220 Capsule  PO  08/27/20 09:59  220 mg





  DAILY MISAEL   Administration


 


Zolpidem Tartrate  5 mg  07/29/20 16:16 





  Ambien 5 Mg Tablet  PO  08/05/20 16:15 





  HSP PRN  





  SLEEP OR INSOMNIA  














Discontinued Medications














Generic Name Dose Route Start Last Admin





  Trade Name Freq  PRN Reason Stop Dose Admin


 


Albuterol  2.5 mg  07/27/20 19:19 





  Ventolin 0.083% Neb 2.5 Mg/3 Ml Ampul  NEB  08/26/20 19:18 





  RTQ4HP PRN  





  SHORTNESS OF BREATH  


 


Amlodipine Besylate  5 mg  07/29/20 10:00 





  Norvasc 5 Mg Tablet  PO  08/28/20 09:59 





  DAILY MISAEL  


 


Aspirin  81 mg  07/27/20 22:00  07/27/20 22:16





  Ecotrin 81 Mg Ec Tablet  PO  07/27/20 23:00  81 mg





  QHS MISAEL   Administration


 


Atenolol  100 mg  07/29/20 10:00  07/30/20 09:42





  Tenormin 50 Mg Tablet  PO  08/28/20 09:59  100 mg





  DAILY MISAEL   Administration


 


Atorvastatin Calcium  40 mg  07/27/20 22:00  07/27/20 22:28





  Lipitor 40 Mg Tablet  PO  07/27/20 23:00  40 mg





  QHS MISAEL   Administration


 


Atorvastatin Calcium  20 mg  07/28/20 22:00  07/30/20 22:12





  Lipitor 20 Mg Tablet  PO  08/27/20 21:59  20 mg





  QHS MISAEL   Administration


 


Azithromycin  500 mg  07/27/20 18:01  07/27/20 18:35





  Zithromax 250 Mg Tablet  PO  07/27/20 18:02  500 mg





  NOW ONE   Administration


 


Azithromycin  250 mg  07/28/20 18:00  07/30/20 17:33





  Zithromax 250 Mg Tablet  PO  08/04/20 17:59  250 mg





  QPM MISAEL   Administration


 


Dexamethasone Sodium Phosphate  2 mg  07/27/20 22:00  07/29/20 13:35





  Decadron Inj 4 Mg/Ml Vial  IV  08/26/20 21:59  2 mg





  Q8 MISAEL   Administration


 


Dexamethasone Sodium Phosphate  2 mg  07/29/20 22:00  07/30/20 09:44





  Decadron Inj 4 Mg/Ml Vial  IV  08/28/20 21:59  2 mg





  Q12 MISAEL   Administration


 


Diltiazem HCl  20 mg  07/28/20 11:21  07/28/20 11:44





  Cardizem Inj 25 Mg/5 Ml Vial  IV  07/28/20 11:22  20 mg





  NOW ONE   Administration


 


Diltiazem HCl  20 mg  07/30/20 11:30  07/30/20 12:08





  Cardizem Inj 25 Mg/5 Ml Vial  IV  07/30/20 11:31  Not Given





  NOW ONE  


 


Diltiazem HCl  25 mg  07/31/20 07:00  07/31/20 06:59





  Cardizem Inj 25 Mg/5 Ml Vial  IV  07/31/20 07:01  25 mg





  NOW ONE   Administration


 


Diltiazem HCl  Confirm  07/31/20 06:53  07/31/20 07:54





  Cardizem Inj 25 Mg/5 Ml Vial  Administered  07/31/20 06:54  Not Given





  Dose  





  25 mg  





  .ROUTE  





  .STK-MED ONE  


 


Hydralazine HCl  25 mg  07/29/20 10:00  07/31/20 10:00





  Apresoline 25 Mg Tablet  PO  08/28/20 09:59  Not Given





  Q12 MISAEL  


 


Sodium Chloride  1,000 mls @ 0 mls/hr  07/27/20 18:01  07/27/20 19:36





  Nacl 0.9% 1000 Ml Iv Soln  IV  07/27/20 18:02  Infused





  BOLUS ONE   Infusion





  Wide Open  


 


Ceftriaxone Sodium/Dextrose  2 gm in 50 mls @ 100 mls/hr  07/27/20 18:01  0

7/27/20 19:07





  Rocephin Rtu 2 Gm/D5w 50 Ml Premix Bag  IV  07/27/20 18:30  Infused





  NOW ONE   Infusion


 


Ceftriaxone Sodium/Dextrose  1 gm in 50 mls @ 100 mls/hr  07/28/20 10:00  

07/29/20 10:49





  Rocephin Rtu 1 Gm/D5w 50 Ml Premix  IV  08/04/20 09:59  Infused





  DAILY MISAEL   Infusion


 


Insulin Human Lispro  16 unit  07/28/20 12:30  07/28/20 12:24





  Humalog Insulin 100 Unit/1 Ml 3 Ml Vial  SUBCUT  07/28/20 12:31  16 unit





  NOW ONE   Administration


 


Insulin Human Lispro  Confirm  07/28/20 22:21  07/28/20 22:27





  Humalog Insulin 100 Unit/1 Ml 3 Ml Vial  Administered  07/28/20 22:22  Not G

iven





  Dose  





  1 unit  





  .ROUTE  





  .ST-MED ONE  


 


Insulin Human Lispro  15 unit  07/28/20 22:30  07/28/20 22:38





  Humalog Insulin 100 Unit/1 Ml 3 Ml Vial  SUBCUT  07/28/20 22:31  15 unit





  NOW ONE   Administration


 


Insulin Human Lispro  20 unit  07/29/20 13:30  07/29/20 13:34





  Humalog Insulin 100 Unit/1 Ml 3 Ml Vial  SUBCUT  07/29/20 13:31  20 unit





  NOW ONE   Administration


 


Insulin Human Lispro  20 unit  07/29/20 14:00  07/29/20 13:41





  Humalog Insulin 100 Unit/1 Ml 3 Ml Vial  SUBCUT  07/29/20 14:01  20 unit





  NOW ONE   Administration


 


Insulin Human NPH  20 unit  07/29/20 18:00 





  Humulin N (Nph) Insulin 100 Unit/1 Ml 3 Ml  SUBCUT  08/28/20 17:59 





  QPM [facility]  


 


Insulin Human Regular  0 - 12 unit  07/27/20 22:00  07/28/20 11:37





  Humulin R (Pyxis) Insulin 100 Unit/Ml 3ml  SUBCUT  08/26/20 21:59  Not Given





  ACHS [facility]  





  Protocol  


 


Insulin Human Regular  4 unit  07/28/20 10:30  07/28/20 09:45





  Humulin R (Pyxis) Insulin 100 Unit/Ml 3ml  SUBCUT  07/28/20 10:31  4 unit





  NOW ONE   Administration


 


Levothyroxine Sodium  0.075 mg  07/29/20 10:00 





  Synthroid 0.075 Mg Tablet  PO  08/28/20 09:59 





  [id number]@1000 [facility]  


 


Levothyroxine Sodium  0.088 mg  08/01/20 10:00 





  Synthroid 0.088 Mg Tablet  PO  08/31/20 09:59 





  SUSA@1000 [facility]  


 


Lisinopril  10 mg  07/27/20 22:00  07/27/20 22:31





  Prinivil 10 Mg Tablet  PO  07/27/20 23:00  10 mg





  DAILY [facility]   Administration


 


Lisinopril  20 mg  07/29/20 10:00  07/31/20 10:01





  Prinivil 10 Mg Tablet  PO  08/28/20 09:59  Not Given





  DAILY [facility]  


 


Metoprolol Tartrate  25 mg  07/30/20 12:00  07/31/20 17:03





  Lopressor 25 Mg Tablet  PO  08/29/20 11:59  25 mg





  Q6 MISAEL   Administration


 


Metoprolol Tartrate  Confirm  07/31/20 06:13  07/31/20 06:18





  Lopressor Inj/Pf 5 Mg/5 Ml Sdv  Administered  07/31/20 06:14  5 mg





  Dose   Administration





  5 mg  





  IV  





  .STK-MED ONE  


 


Metoprolol Tartrate  5 mg  07/31/20 06:45  07/31/20 07:54





  Lopressor Inj/Pf 5 Mg/5 Ml Sdv  IV  07/31/20 06:46  Not Given





  NOW ONE  














Assessment & Plan





- Diagnosis


(1) Atrial fibrillation with RVR


Is this a current diagnosis for this admission?: Yes   


Plan: 


Doing better after her Lopressor was increased to 50 mg every 6 hours.  She has 

no evidence of heart failure on exam or by radiographic assessment.





Recommendations:


-Continue with current medical management.


-May transition anticoagulation to Eliquis 5 mg p.o. twice daily.


-We will keep her on telemetry at least 24 more hours to ensure that her heart 

rate remains at goal.


-We will plan to see her in the office once discharged for further treatment of 

atrial fibrillation such as antiarrhythmics versus cardioversion versus 

antiarrhythmics plus cardioversion if her atrial fibrillation persist..














(2) Coronary artery disease


Qualifiers: 


   Coronary Disease-Associated Artery/Lesion type: native artery   Native vs. 

transplanted heart: native heart   Associated angina: without angina   Qualified

Code(s): I25.10 - Atherosclerotic heart disease of native coronary artery 

without angina pectoris   


Is this a current diagnosis for this admission?: Yes   


Plan: 


The patient underwent stenting of the proximal right coronary artery in May 

2002.  She is currently free of ischemic symptoms however she does have lower 

extremity edema which she attributes to increased doses of Norvasc.  She has 

remained free of ischemic and heart failure symptoms and without evidence of 

heart failure by exam or radiographic assessment.





Recommendations:


-Discontinue hydralazine.


-Uptitrate lisinopril as indicated to keep systolic blood pressure to 130/80 or 

below.


-Strict intake and output.


-Minimize IV fluids.


-Low sodium/low cholesterol diet.


-Increase Lipitor to 80 mg daily.


-We will continue to follow-up with you.








(3) Hypertension


Qualifiers: 


   Hypertension type: essential hypertension   Qualified Code(s): I10 - 

Essential (primary) hypertension   


Is this a current diagnosis for this admission?: Yes   


Plan: 


Her blood pressure is at goal.  I will defer further management to the primary 

team.

## 2020-08-01 NOTE — PDOC PROGRESS REPORT
Subjective


Progress Note for:: 08/01/20


Subjective:: 


Patient was seen on morning rounds and again this afternoon.  She is found 

resting in bed, comfortably, on room air.  She states she "feels wonderful."  

She is very relieved to have nml Chest CT and reassuring U/S.  Expresses 

appreciation that we were diligent at diagnosing/treating potential COVID.  "Its

good to know the doctors and nurses care so much to get to the bottom of it!"


She denies fever, chills, chest pain, palpitations, dyspnea, orthopnea, cough, 

dyspepsia, abdominal pain, nausea, vomiting.


She has no other questions or concerns at this time.


No concerns per nursing; improved HR control today.


Reason For Visit: 


COPD EXACERBATION,CORONARY ARTERY DISEASE,HYPERTEN








Physical Exam


Vital Signs: 


                                        











Temp Pulse Resp BP Pulse Ox


 


 98.6 F   89   18   133/68 H  98 


 


 08/01/20 15:09  08/01/20 15:09  08/01/20 15:09  08/01/20 15:09  08/01/20 15:09








                                 Intake & Output











 07/31/20 08/01/20 08/02/20





 06:59 06:59 06:59


 


Intake Total 2001 520 225


 


Output Total  200 


 


Balance 2001 320 225


 


Weight 74.2 kg 74.4 kg 











General appearance: PRESENT: no acute distress, cooperative, well-developed, 

well-nourished


Head exam: PRESENT: atraumatic, normocephalic


Eye exam: PRESENT: conjunctiva pink, EOMI, PERRLA.  ABSENT: scleral icterus


Mouth exam: PRESENT: moist, tongue midline


Teeth exam: PRESENT: poor dentation


Respiratory exam: PRESENT: clear to auscultation steven, symmetrical, unlabored.  

ABSENT: rales, rhonchi, wheezes


Cardiovascular exam: PRESENT: irregular rhythm, +S1, +S2, tachycardia - HR 90-

112 (rest); ~120 w/ minimal activity.  ABSENT: diastolic murmur, rubs, systolic 

murmur


Vascular exam: PRESENT: normal capillary refill


Extremities exam: PRESENT: full ROM.  ABSENT: calf tenderness, clubbing, pedal 

edema


Musculoskeletal exam: PRESENT: ambulatory


Neurological exam: PRESENT: alert, awake, oriented to person, oriented to place,

oriented to time, oriented to situation, CN II-XII grossly intact.  ABSENT: 

motor sensory deficit


Psychiatric exam: PRESENT: appropriate affect, normal mood.  ABSENT: homicidal 

ideation, suicidal ideation


Skin exam: PRESENT: dry, intact, warm.  ABSENT: cyanosis, rash





Results


Laboratory Results: 


                                        





                                 07/31/20 05:47 





                                 08/01/20 05:32 





                                        











  08/01/20 08/01/20





  03:30 05:32


 


Sodium   132.2 L


 


Potassium   4.0


 


Chloride   98


 


Carbon Dioxide   27


 


Anion Gap   7


 


BUN   30 H


 


Creatinine   0.96


 


Est GFR (African Amer)   > 60


 


Glucose   95


 


Calcium   8.7


 


Urine Color  YELLOW 


 


Urine Appearance  CLEAR 


 


Urine pH  6.0 


 


Ur Specific Gravity  1.038 


 


Urine Protein  100 H 


 


Urine Glucose (UA)  50 H 


 


Urine Ketones  NEGATIVE 


 


Urine Blood  NEGATIVE 


 


Urine Nitrite  NEGATIVE 


 


Ur Leukocyte Esterase  NEGATIVE 


 


Urine WBC (Auto)  1 


 


Urine RBC (Auto)  1 











Impressions: 


                                        





Chest/Abdomen CTA  07/27/20 18:00


IMPRESSION: 


 


Mild emphysema.


 








Chest CT  07/31/20 00:00


IMPRESSION:


 


No acute abnormality within the chest.


 


Subacute fracture involving the left anterior fifth rib.


 


3.0 mm solid pulmonary nodule within the upper lobe. If patient


is low risk for malignancy, no routine follow-up imaging is


recommended; if patient is high risk for malignancy, a


non-contrast Chest CT at 12 months is optional. If performed and


the nodule is stable at 12 months, no further follow-up is


recommended.


These guidelines do not apply to immunocompromised patients and


patients with cancer. Follow up in patients with significant


comorbidities as clinically warranted. For lung cancer screening,


adhere to Lung-RADS guidelines. Reference: Radiology. 2017;


284(1):228-43.


 


Splenic varices with splenorenal shunt.


 


TECHNICAL DOCUMENTATION:


 


Quality ID # [id number]: Final reports with documentation of one or more


dose reduction techniques (e.g., Automated exposure control,


adjustment of the mA and/or kV according to patient size, use of


iterative reconstruction technique)


 


copyright 2011 [employer]- All Rights Reserved


 








Chest X-Ray  07/31/20 00:00


IMPRESSION:  No interval change in the chest.


 














Assessment and Plan





- Diagnosis


(1) Atrial fibrillation with RVR


Is this a current diagnosis for this admission?: Yes   


Plan: 


Improved rate control.


Atrial fibrillation with RVR.





Cardiology was consulted; discussed with Dr. Haro.


He advises to discontinue atenolol.


Continue Toprol 50 mg every 6 hours.


Start Eliquis.








(2) COPD (chronic obstructive pulmonary disease) with chronic bronchitis


Is this a current diagnosis for this admission?: Yes   


Plan: 


Stable and without exacerbation at this time.


The patient reports COPD.  She states that she has not had an exacerbation in a 

long time.  She does have an albuterol rescue inhaler at home but has been not 

on any chronic daily inhaler therapy.  She does not use oxygen at home.








(3) Coronary artery disease


Qualifiers: 


   Coronary Disease-Associated Artery/Lesion type: native artery   Native vs. 

transplanted heart: native heart   Associated angina: without angina   Qualified

Code(s): I25.10 - Atherosclerotic heart disease of native coronary artery 

without angina pectoris   


Is this a current diagnosis for this admission?: Yes   


Plan: 


The patient states that she had a stent placed approximately 10 years ago or 

more.  She was following with cardiology but then decided to change 

practitioners.  She states she would be seeing Dr. Uribe at [facility] 

to establish with new cardiology. 





Resume aspirin and statin therapy.


Resume a home antihypertensive regiment of amlodipine, lisinopril/HCTZ, 

bisprolol, and hydralazine.


Cardiac diet.








(4) Depression


Qualifiers: 


   Depression Type: unspecified   Qualified Code(s): F32.9 - Major depressive 

disorder, single episode, unspecified   


Is this a current diagnosis for this admission?: Yes   


Plan: 


Home dose Celexa.


Monitor QTC while given concurrently with azithromycin.


Vistaril as needed anxiety








(5) Diabetes mellitus


Qualifiers: 


   Diabetes mellitus type: type 1   Diabetes mellitus complication status: 

without complication   Qualified Code(s): E10.9 - Type 1 diabetes mellitus 

without complications   


Is this a current diagnosis for this admission?: Yes   


Plan: 


A1c 6.4%.


Resume home dose NPH.


Accu-Cheks before meals and at bedtime with Humalog per sliding scale coverage.


Hypoglycemia protocol.


Cardiac/consistent carb diet.








(6) Elevated d-dimer


Is this a current diagnosis for this admission?: Yes   


Plan: 


CTA chest negative for pulmonary embolus.


Improving; d-dimer down to 1.47





Now on Eliquis








(7) Elevated liver function tests


Is this a current diagnosis for this admission?: Yes   


Plan: 


Resolved.


She reports rare to occasional glasses of wine.  No other indication for elevat

ed transaminases.  





Transaminitis is 1 of the abnormalities seen in COVID.  


Continue to monitor.








(8) Fever


Qualifiers: 


   Fever type: unspecified   Qualified Code(s): R50.9 - Fever, unspecified   


Is this a current diagnosis for this admission?: Yes   


Plan: 


Resolved.





Blood cultures (1/4 bottles) shows coag negative staph; contaminant.


Antibiotics as below








(9) Hypertension


Qualifiers: 


   Hypertension type: essential hypertension   Qualified Code(s): I10 - 

Essential (primary) hypertension   


Is this a current diagnosis for this admission?: Yes   


Plan: 


Resume a home antihypertensive regiment of lisinopril/HCTZ, bisprolol, and 

hydralazine.


Increased amlodipine to 10 mg daily.


Cardiac diet.








(10) Hyponatremia


Is this a current diagnosis for this admission?: Yes   


Plan: 


Chronic per patient, however, labs from January 2020 show sodium 134.8; 

therefore, her hyponatremia today is significantly lower than what is presumed 

to be her baseline.


Sodium trending overall stable; 125.3-> 122.5-> 125.1





Liberalize dietary sodium.


Fluid restrict 1.2 L/daily


Follow up chemistry.








(11) Hypothyroidism, postsurgical


Is this a current diagnosis for this admission?: Yes   


Plan: 


Continue home dose levothyroxine.








(12) Suspected COVID-19 virus infection


Is this a current diagnosis for this admission?: Yes   


Plan: 


Ruled Out


Rapid and send out COVID-19 tests are negative.


However, she has numerous laboratory results supportive of COVID-19 (elevated d-

dimer, transaminases, CRP, LDH, ferritin) that can not be explained otherwise.


CTA negative for pulmonary embolus and pneumonia.


COPD exacerbation is not suspected (no wheezing on exam) and certainly would not

be the cause of the above-mentioned laboratory changes.


Chest U/S benign


Chest CT w/ negative for acute findings; specifically no ground glass or 

infiltrates noted.





D-dimer improved (1.47)


CRP equivocal (132.7


Ferritin increased (581)


LDH increased (289)





Due to elevated d-dimer, she was placed on full dose Lovenox.  


Received 3 days of Rocephin


Completed 5 day course of Azithromycin.


Transitioned to p.o. prednisone; weaning


Continue vitamin C, vitamin D, zinc, and melatonin supplementation.


Encourage pulmonary toilet.


Contact and droplet precautions.





Discussed with Dr. Farley yesterday.  Patient and family with, understandable, 

high level of frustration/anxiety regarding unclear dx.  Dr. Martine provided 

bedside eval w/ US.  B lines noted; recommended follow up CT Chest to evaluate 

for ground glass changes.  


As both are negative, effectively r/o COVID.








- Time


Time Spent with patient: 35 or more minutes


Medications reviewed and adjusted accordingly: Yes


Anticipated Discharge Disposition: Home, Self Care


Anticipated Discharge Timeframe: within 24 hours

## 2020-08-02 RX ADMIN — LEVOTHYROXINE SODIUM SCH MG: 88 TABLET ORAL at 07:00

## 2020-08-02 RX ADMIN — OXYCODONE HYDROCHLORIDE AND ACETAMINOPHEN SCH MG: 500 TABLET ORAL at 09:47

## 2020-08-02 RX ADMIN — PREDNISONE SCH MG: 20 TABLET ORAL at 09:46

## 2020-08-02 RX ADMIN — Medication SCH TAB: at 23:26

## 2020-08-02 RX ADMIN — INSULIN LISPRO SCH: 100 INJECTION, SOLUTION INTRAVENOUS; SUBCUTANEOUS at 16:17

## 2020-08-02 RX ADMIN — LISINOPRIL SCH MG: 10 TABLET ORAL at 09:46

## 2020-08-02 RX ADMIN — APIXABAN SCH MG: 5 TABLET, FILM COATED ORAL at 09:47

## 2020-08-02 RX ADMIN — APIXABAN SCH MG: 5 TABLET, FILM COATED ORAL at 17:12

## 2020-08-02 RX ADMIN — INSULIN LISPRO SCH UNIT: 100 INJECTION, SOLUTION INTRAVENOUS; SUBCUTANEOUS at 13:26

## 2020-08-02 RX ADMIN — Medication SCH: at 23:27

## 2020-08-02 RX ADMIN — INSULIN LISPRO SCH UNIT: 100 INJECTION, SOLUTION INTRAVENOUS; SUBCUTANEOUS at 23:27

## 2020-08-02 RX ADMIN — Medication SCH MG: at 09:47

## 2020-08-02 RX ADMIN — PANTOPRAZOLE SODIUM SCH MG: 40 TABLET, DELAYED RELEASE ORAL at 06:57

## 2020-08-02 RX ADMIN — ASPIRIN SCH MG: 81 TABLET, COATED ORAL at 09:47

## 2020-08-02 RX ADMIN — CITALOPRAM HYDROBROMIDE SCH MG: 20 TABLET ORAL at 09:46

## 2020-08-02 RX ADMIN — AMLODIPINE BESYLATE SCH: 10 TABLET ORAL at 10:59

## 2020-08-02 RX ADMIN — ATORVASTATIN CALCIUM SCH MG: 20 TABLET, FILM COATED ORAL at 23:26

## 2020-08-02 RX ADMIN — METOPROLOL TARTRATE SCH MG: 100 TABLET, FILM COATED ORAL at 23:26

## 2020-08-02 RX ADMIN — INSULIN LISPRO SCH: 100 INJECTION, SOLUTION INTRAVENOUS; SUBCUTANEOUS at 08:18

## 2020-08-02 RX ADMIN — Medication SCH: at 06:57

## 2020-08-02 RX ADMIN — MULTIVITAMIN TABLET SCH TAB: TABLET at 09:46

## 2020-08-02 RX ADMIN — VITAMIN D, TAB 1000IU (100/BT) SCH UNIT: 25 TAB at 09:46

## 2020-08-02 RX ADMIN — METOPROLOL TARTRATE SCH MG: 100 TABLET, FILM COATED ORAL at 09:47

## 2020-08-02 RX ADMIN — METOPROLOL TARTRATE SCH MG: 50 TABLET, FILM COATED ORAL at 06:57

## 2020-08-02 RX ADMIN — FOLIC ACID SCH MG: 1 TABLET ORAL at 09:47

## 2020-08-02 RX ADMIN — INSULIN HUMAN SCH UNIT: 100 INJECTION, SUSPENSION SUBCUTANEOUS at 09:45

## 2020-08-02 RX ADMIN — Medication SCH ML: at 13:37

## 2020-08-02 RX ADMIN — MELATONIN SCH MG: 3 TAB ORAL at 23:26

## 2020-08-02 RX ADMIN — OXYCODONE HYDROCHLORIDE AND ACETAMINOPHEN SCH MG: 500 TABLET ORAL at 17:12

## 2020-08-02 RX ADMIN — PANTOPRAZOLE SODIUM SCH MG: 40 TABLET, DELAYED RELEASE ORAL at 17:11

## 2020-08-02 RX ADMIN — INSULIN HUMAN SCH UNIT: 100 INJECTION, SUSPENSION SUBCUTANEOUS at 23:27

## 2020-08-02 NOTE — PROGRESS NOTE
Provider Note


Provider Note: 


I discussed the patient with Dr. Jazmin Pete, electrophysiologist at 

[facility]. She agrees with me that the next best option to 

control the patients a-fib will be JENNIFER-guided CV therefore I spoke with Dr. Ramiro Ta who agreed to perform the procedure tomorrow. Please keep patient NPO 

after midnight except for medications.

## 2020-08-02 NOTE — PDOC PROGRESS REPORT
Subjective


Progress Note for:: 08/02/20


Subjective:: 


Patient was seen on morning rounds and again this afternoon.  She is found 

resting in bed, comfortably, on room air.  Feels well today.  She does have 

occasional palpitations; most noted with activity (heart rate 120s 140s with 

minimal activity); but without chest pain or dyspnea.


She further denies fever, chills, chest pain, dyspnea, orthopnea, cough, 

dyspepsia, abdominal pain, nausea, vomiting.


Discussed planned JENNIFER and cardioversion tomorrow; all questions answered.  She 

has no other questions or concerns at this time.


No concerns per nursing.


Reason For Visit: 


COPD EXACERBATION,CORONARY ARTERY DISEASE,HYPERTEN








Physical Exam


Vital Signs: 


                                        











Temp Pulse Resp BP Pulse Ox


 


 98.1 F   78   19   124/70   100 


 


 08/02/20 11:28  08/02/20 11:28  08/02/20 11:28  08/02/20 11:28  08/02/20 11:28








                                 Intake & Output











 08/01/20 08/02/20 08/03/20





 06:59 06:59 06:59


 


Intake Total 520 905 480


 


Output Total 200  


 


Balance 320 905 480


 


Weight 74.4 kg 74.5 kg 











General appearance: PRESENT: no acute distress, cooperative, well-developed, 

well-nourished


Head exam: PRESENT: atraumatic, normocephalic


Eye exam: PRESENT: conjunctiva pink, EOMI, PERRLA.  ABSENT: scleral icterus


Mouth exam: PRESENT: moist, tongue midline


Respiratory exam: PRESENT: clear to auscultation steven, symmetrical, unlabored.  

ABSENT: rales, rhonchi, wheezes


Cardiovascular exam: PRESENT: irregular rhythm, tachycardia - HR  (rest); 

120-140 (minimal activity).  ABSENT: diastolic murmur, rubs, systolic murmur


Pulses: PRESENT: normal dorsalis pedis pul


Vascular exam: PRESENT: normal capillary refill


Rectal exam: PRESENT: deferred


Extremities exam: PRESENT: full ROM.  ABSENT: calf tenderness, clubbing, pedal 

edema


Musculoskeletal exam: PRESENT: ambulatory


Neurological exam: PRESENT: alert, awake, oriented to person, oriented to place,

oriented to time, oriented to situation, CN II-XII grossly intact.  ABSENT: 

motor sensory deficit


Psychiatric exam: PRESENT: appropriate affect, normal mood.  ABSENT: homicidal 

ideation, suicidal ideation


Skin exam: PRESENT: dry, intact, warm.  ABSENT: cyanosis, rash





Results


Laboratory Results: 


                                        





                                 07/31/20 05:47 





                                 08/01/20 05:32 





                                        





07/27/20 20:11   Blood   Blood Culture - Final


                            NO GROWTH IN 5 DAYS








Impressions: 


                                        





Chest/Abdomen CTA  07/27/20 18:00


IMPRESSION: 


 


Mild emphysema.


 








Chest CT  07/31/20 00:00


IMPRESSION:


 


No acute abnormality within the chest.


 


Subacute fracture involving the left anterior fifth rib.


 


3.0 mm solid pulmonary nodule within the upper lobe. If patient


is low risk for malignancy, no routine follow-up imaging is


recommended; if patient is high risk for malignancy, a


non-contrast Chest CT at 12 months is optional. If performed and


the nodule is stable at 12 months, no further follow-up is


recommended.


These guidelines do not apply to immunocompromised patients and


patients with cancer. Follow up in patients with significant


comorbidities as clinically warranted. For lung cancer screening,


adhere to Lung-RADS guidelines. Reference: Radiology. 2017;


284(1):228-43.


 


Splenic varices with splenorenal shunt.


 


TECHNICAL DOCUMENTATION:


 


Quality ID # [id number]: Final reports with documentation of one or more


dose reduction techniques (e.g., Automated exposure control,


adjustment of the mA and/or kV according to patient size, use of


iterative reconstruction technique)


 


copyright 2011 [employer]- All Rights Reserved


 








Chest X-Ray  07/31/20 00:00


IMPRESSION:  No interval change in the chest.


 














Assessment and Plan





- Diagnosis


(1) Atrial fibrillation with RVR


Is this a current diagnosis for this admission?: Yes   


Plan: 


Improved rate control; not at goal.


Atrial fibrillation with RVR.





Cardiology was consulted; discussed with Dr. Haro.


He advises to discontinue atenolol.


Continue metoprolol 100 mg every 12 hours.


Continue Eliquis.


NPO after midnight.


Planned JENNFIER/Cardioversion by Dr. Ta tomorrow








(2) COPD (chronic obstructive pulmonary disease) with chronic bronchitis


Is this a current diagnosis for this admission?: Yes   


Plan: 


Stable and without exacerbation at this time.


The patient reports COPD.  She states that she has not had an exacerbation in a 

long time.  She does have an albuterol rescue inhaler at home but has been not 

on any chronic daily inhaler therapy.  She does not use oxygen at home.








(3) Coronary artery disease


Qualifiers: 


   Coronary Disease-Associated Artery/Lesion type: native artery   Native vs. 

transplanted heart: native heart   Associated angina: without angina   Qualified

Code(s): I25.10 - Atherosclerotic heart disease of native coronary artery 

without angina pectoris   


Is this a current diagnosis for this admission?: Yes   


Plan: 


The patient states that she had a stent placed approximately 10 years ago or 

more.  She was following with cardiology but then decided to change 

practitioners.  





Continue aspirin and statin therapy.


Antihypertensive as above.


Cardiac diet.








(4) Depression


Qualifiers: 


   Depression Type: unspecified   Qualified Code(s): F32.9 - Major depressive 

disorder, single episode, unspecified   


Is this a current diagnosis for this admission?: Yes   


Plan: 


Home dose Celexa.


Monitor QTC while given concurrently with azithromycin.


Vistaril as needed anxiety








(5) Diabetes mellitus


Qualifiers: 


   Diabetes mellitus type: type 1   Diabetes mellitus complication status: 

without complication   Qualified Code(s): E10.9 - Type 1 diabetes mellitus 

without complications   


Is this a current diagnosis for this admission?: Yes   


Plan: 


Improved glucose control with weaning of steroids.


A1c 6.4%.


Resume home dose NPH.


Accu-Cheks before meals and at bedtime with Humalog per sliding scale coverage.


Hypoglycemia protocol.


Cardiac/consistent carb diet.








(6) Elevated d-dimer


Is this a current diagnosis for this admission?: Yes   


Plan: 


CTA chest negative for pulmonary embolus.


Improving; d-dimer down to 1.47





Now on Eliquis








(7) Elevated liver function tests


Is this a current diagnosis for this admission?: Yes   


Plan: 


Resolved.


She reports rare to occasional glasses of wine.  No other indication for eleva

monty transaminases.  





Transaminitis is 1 of the abnormalities seen in COVID.  


Continue to monitor.








(8) Fever


Qualifiers: 


   Fever type: unspecified   Qualified Code(s): R50.9 - Fever, unspecified   


Is this a current diagnosis for this admission?: Yes   


Plan: 


Resolved.





Blood cultures (1/4 bottles) shows coag negative staph; contaminant.


Antibiotics as below








(9) Hypertension


Qualifiers: 


   Hypertension type: essential hypertension   Qualified Code(s): I10 - 

Essential (primary) hypertension   


Is this a current diagnosis for this admission?: Yes   


Plan: 


Well-controlled at present.  Currently on metoprolol 100 mg every 12 and 

lisinopril 10 mg daily.


Cardiac diet.








(10) Hyponatremia


Is this a current diagnosis for this admission?: Yes   


Plan: 


Chronic per patient, however, labs from January 2020 show sodium 134.8; 

therefore, her hyponatremia today is significantly lower than what is presumed 

to be her baseline.


Sodium trending overall stable; 125.3-> 122.5-> 125.1





Liberalize dietary sodium.


Fluid restrict 1.2 L/daily


Follow up chemistry.








(11) Hypothyroidism, postsurgical


Is this a current diagnosis for this admission?: Yes   


Plan: 


Continue home dose levothyroxine.








(12) Suspected COVID-19 virus infection


Is this a current diagnosis for this admission?: Yes   


Plan: 


Ruled Out


Rapid and send out COVID-19 tests are negative.


However, she has numerous laboratory results supportive of COVID-19 (elevated d-

dimer, transaminases, CRP, LDH, ferritin) that can not be explained otherwise.


CTA negative for pulmonary embolus and pneumonia.


COPD exacerbation is not suspected (no wheezing on exam) and certainly would not

be the cause of the above-mentioned laboratory changes.


Chest U/S benign


Chest CT w/ negative for acute findings; specifically no ground glass or 

infiltrates noted.





D-dimer improved (1.47)


CRP equivocal (132.7


Ferritin increased (581)


LDH increased (289)





Due to elevated d-dimer, she was placed on full dose Lovenox.  


Received 3 days of Rocephin


Completed 5 day course of Azithromycin.


Transitioned to p.o. prednisone; weaning


Continue vitamin C, vitamin D, zinc, and melatonin supplementation.


Encourage pulmonary toilet.


Contact and droplet precautions.





Patient and family with, understandable, high level of frustration/anxiety 

regarding unclear dx.  Dr. Farley provided bedside eval w/ US.  B lines noted; 

recommended follow up CT Chest to evaluate for ground glass changes.  


As both are negative, effectively r/o COVID.








- Time


Time Spent with patient: 35 or more minutes


Medications reviewed and adjusted accordingly: Yes


Anticipated Discharge Disposition: Home, Self Care


Anticipated Discharge Timeframe: within 72 hours - pending successful 

cardioversion

## 2020-08-02 NOTE — PDOC PROGRESS REPORT
Subjective


Progress Note for:: 08/02/20


Subjective:: 





NADEEN BARRIOS is a 64 year old female with a history of coronary artery 

disease status post PCI to the proximal RCA with a 4.0 mm x 18 mm Elite stent in

May 2002, palpitations, PACs in the past, COPD, hyperlipidemia, hypertension, 

depression, diabetes mellitus and hyponatremia who was admitted to [facility] on 

07/27/2020 with several weeks of increasing shortness of breath and cough as 

well as fevers and who is consulted to our service for rapid atrial 

fibrillation.  The hospitalist service has been treating her for COVID-19 as she

clinically is having all symptoms and signs of COVID infection despite negative 

testing.  The patient had been responding appropriately to treatment for her 

pulmonary condition however began to have episodes of paroxysmal atrial fibri

llation recently and, as of yesterday, had remained in rapid atrial 

fibrillation.  She was begun on Lopressor 25 p.o. every 6 with some improvement 

in her pulse.  This morning she is found in bed, resting comfortably although 

she complains of shortness of breath and racing heart.  She feels hot to the 

touch.





08/02/2020:


The patient feels 100% better this morning when compared to yesterday and has no

cardiovascular complaints.  She underwent CT scan of the chest as well as lung 

ultrasound yesterday all of which found no evidence of COVID infection.  Her 

beta-blocker was increased to 50 mg p.o. every 6 hours with a significant 

improvement in her heart rate at rest however with just moving in the bed and 

using the bathroom her heart rate becomes uncontrolled and up to 140 bpm 

although asymptomatic.





Physical exam on 08/02/2020:


GENERAL:  Pleasant and conversational.  Oriented x3 with normal mood.  Not in 

acute distress however she is speaking in short sentences and appears to be 

short of breath.


HEENT:  Normocephalic, atraumatic.  Pupils equal.   Sclerae anicteric.  

Oropharynx moist. 


NECK:  No JVD.  No carotid bruits. 


LUNGS:  Clear to auscultation bilaterally, decreased breath sounds bilaterally. 

Normal respiratory effort without the use of accessory muscles or intercostal 

retractions.  


CARDIOVASCULAR: Irregular rate and rhythm, normal S1 and S2 without murmurs, 

rubs, or gallops.  PMI not displaced.


ABDOMEN: No masses or tenderness to palpation.  No bruit.  No splenomegaly or 

hepatomegaly. No abdominal aorta bruit noted.


EXTREMITIES: 1+ pitting edema bilaterally, no cyanosis, no clubbing.  +2 pulses 

femoral and pedal pulses bilaterally.  


SKIN: No lesions or rashes.


MUSCULOSKELETAL:  No chest tenderness to palpation. 


NEUROLOGIC: Nonfocal.  No gross sensory or motor deficits bilateral upper or 

lower extremities.





Reason For Visit: 


COPD EXACERBATION,CORONARY ARTERY DISEASE,HYPERTEN








Physical Exam


Vital Signs: 


                                        











Temp Pulse Resp BP Pulse Ox


 


 97.9 F   62   16   125/76   98 


 


 08/02/20 04:32  08/02/20 04:32  08/02/20 04:32  08/02/20 04:32  08/02/20 04:32








                                 Intake & Output











 07/31/20 08/01/20 08/02/20





 06:59 06:59 06:59


 


Intake Total 2001 520 905


 


Output Total  200 


 


Balance 2001 320 905


 


Weight 74.2 kg 74.4 kg 














Results


Laboratory Results: 


                                        





                                 07/31/20 05:47 





                                 08/01/20 05:32 





                                        





07/27/20 20:11   Blood   Blood Culture - Final


                            NO GROWTH IN 5 DAYS








Impressions: 


                                        





Chest/Abdomen CTA  07/27/20 18:00


IMPRESSION: 


 


Mild emphysema.


 








Chest CT  07/31/20 00:00


IMPRESSION:


 


No acute abnormality within the chest.


 


Subacute fracture involving the left anterior fifth rib.


 


3.0 mm solid pulmonary nodule within the upper lobe. If patient


is low risk for malignancy, no routine follow-up imaging is


recommended; if patient is high risk for malignancy, a


non-contrast Chest CT at 12 months is optional. If performed and


the nodule is stable at 12 months, no further follow-up is


recommended.


These guidelines do not apply to immunocompromised patients and


patients with cancer. Follow up in patients with significant


comorbidities as clinically warranted. For lung cancer screening,


adhere to Lung-RADS guidelines. Reference: Radiology. 2017;


284(1):228-43.


 


Splenic varices with splenorenal shunt.


 


TECHNICAL DOCUMENTATION:


 


Quality ID # [id number]: Final reports with documentation of one or more


dose reduction techniques (e.g., Automated exposure control,


adjustment of the mA and/or kV according to patient size, use of


iterative reconstruction technique)


 


copyright 2011 [employer]- All Rights Reserved


 








Chest X-Ray  07/31/20 00:00


IMPRESSION:  No interval change in the chest.


 








                                        





                                 07/31/20 05:47 





                                 08/01/20 05:32 





                                        











MCV  83 fl (80-97)   07/31/20  05:47    


 


MCH  28.5 pg (27.0-33.4)   07/31/20  05:47    


 


MCHC  34.5 g/dL (32.0-36.0)   07/31/20  05:47    


 


RDW  15.0 % (11.5-14.0)  H  07/31/20  05:47    


 


Seg Neutrophils %  86.0 % (42-78)  H  07/28/20  05:06    


 


Chloride  98 mmol/L ()   08/01/20  05:32    


 


Carbon Dioxide  27 mmol/L (22-30)   08/01/20  05:32    


 


Anion Gap  7  (5-19)   08/01/20  05:32    


 


Est GFR ( Amer)  > 60  (>60)   08/01/20  05:32    


 


Est GFR (Non-Af Amer)  Cancelled   07/27/20  15:01    


 


Glucose  95 mg/dL ()   08/01/20  05:32    


 


Calcium  8.7 mg/dL (8.4-10.2)   08/01/20  05:32    


 


Phosphorus  3.2 mg/dL (2.5-4.5)   07/28/20  05:06    


 


Magnesium  1.5 mg/dL (1.6-2.3)  L  07/28/20  05:06    


 


Ferritin  581.00 ng/mL (11.1-264.0)  H  07/29/20  05:14    


 


Total Bilirubin  0.6 mg/dL (0.2-1.3)   07/27/20  16:25    


 


AST  84 U/L (14-36)  H  07/27/20  16:25    


 


Alkaline Phosphatase  69 U/L ()   07/27/20  16:25    


 


C-Reactive Protein  132.7 mg/L (<10.0)  H  07/29/20  05:14    


 


Total Protein  7.9 g/dL (6.3-8.2)   07/27/20  16:25    


 


Albumin  4.4 g/dL (3.5-5.0)   07/27/20  16:25    


 


Triglycerides  105 mg/dL (<150)   07/28/20  05:06    


 


Cholesterol  122.99 mg/dL (0-200)   07/28/20  05:06    


 


LDL Cholesterol Direct  46 mg/dL (<100)   07/28/20  05:06    


 


VLDL Cholesterol  21.0 mg/dL (10-31)   07/28/20  05:06    


 


HDL Cholesterol  52 mg/dL (>40)   07/28/20  05:06    


 


TSH  0.13 uIU/mL (0.47-4.68)  L  07/28/20  05:06    


 


Free T4  1.45 ng/dL (0.78-2.19)   07/28/20  05:06    


 


Free T3 pg/mL  1.81 pg/mL (2.77-5.27)  L  07/28/20  05:06    


 


Urine Color  YELLOW   08/01/20  03:30    


 


Urine Appearance  CLEAR   08/01/20  03:30    


 


Urine pH  6.0  (5.0-9.0)   08/01/20  03:30    


 


Ur Specific Gravity  1.038   08/01/20  03:30    


 


Urine Protein  100 mg/dL (NEGATIVE)  H  08/01/20  03:30    


 


Urine Glucose (UA)  50 mg/dL (NEGATIVE)  H  08/01/20  03:30    


 


Urine Ketones  NEGATIVE mg/dL (NEGATIVE)   08/01/20  03:30    


 


Urine Blood  NEGATIVE  (NEGATIVE)   08/01/20  03:30    


 


Urine Nitrite  NEGATIVE  (NEGATIVE)   08/01/20  03:30    


 


Ur Leukocyte Esterase  NEGATIVE  (NEGATIVE)   08/01/20  03:30    


 


Urine WBC (Auto)  1 /HPF  08/01/20  03:30    


 


Urine RBC (Auto)  1 /HPF  08/01/20  03:30    








                                        





07/27/20 20:11   Blood   Blood Culture - Final


                            NO GROWTH IN 5 DAYS





                             Current Medication List











Generic Name Dose Route Start Last Admin





  Trade Name Edward  PRN Reason Stop Dose Admin


 


Acetaminophen  650 mg  07/27/20 19:19 





  Tylenol 325 Mg Tablet  PO  08/26/20 19:18 





  Q4HP PRN  





  FOR PAIN OR TEMP  


 


Al Hydrox/Mg Hydrox/Simethicone  15 ml  07/27/20 19:19 





  Maalox Plus Susp 30 Udcup  PO  08/26/20 19:18 





  Q6HP PRN  





  HEARTBURN  


 


Albuterol  2 puff  07/27/20 19:48 





  Proair Hfa Inhalation Aerosol 8.5 Gm Mdi  IH  08/26/20 19:47 





  Q4HP PRN  





  WHEEZING  


 


Amlodipine Besylate  10 mg  07/29/20 10:00  08/01/20 09:20





  Norvasc 10 Mg Tablet  PO  08/28/20 09:59  Not Given





  DAILY MISAEL  


 


Apixaban  5 mg  08/01/20 18:00  08/01/20 17:09





  Eliquis 5 Mg Tablet  PO  08/31/20 17:59  5 mg





  BID MISAEL   Administration


 


Ascorbic Acid  500 mg  07/28/20 10:00  08/01/20 17:09





  Vitamin C 500 Mg Tablet  PO  08/27/20 09:59  500 mg





  BID MISAEL   Administration


 


Aspirin  81 mg  07/29/20 10:00  08/01/20 09:15





  Ecotrin 81 Mg Ec Tablet  PO  08/28/20 09:59  81 mg





  DAILY MISAEL   Administration


 


Atorvastatin Calcium  80 mg  07/31/20 22:00  08/01/20 22:08





  Lipitor 20 Mg Tablet  PO  08/30/20 21:59  80 mg





  QHS MISAEL   Administration


 


Calcium Carbonate  1 tab  07/28/20 22:00  08/01/20 22:09





  Caltrate 600-Vit D3 400 Tablet  PO  08/27/20 21:59  1 tab





  QHS MISAEL   Administration


 


Cetirizine HCl  10 mg  07/28/20 20:25 





  Zyrtec 10 Mg Tablet  PO  08/27/20 20:24 





  HSP PRN  





  FOR ALLERGIES  


 


Cholecalciferol  2,000 unit  07/28/20 10:00  08/01/20 09:15





  Vitamin D3 1000 Unit Tablet  PO  08/27/20 09:59  2,000 unit





  DAILY MISAEL   Administration


 


Citalopram Hydrobromide  10 mg  07/29/20 10:00  08/01/20 09:16





  Celexa 20 Mg Tablet  PO  08/28/20 09:59  10 mg





  DAILY MISAEL   Administration


 


Dextrose  12.5 gm  07/27/20 19:39 





  Dextrose Inj 50% Syringe (25 Gm/50 Ml)  IV  08/26/20 19:38 





  PRN PRN  





  FOR BG 50-69 IN ALERT PATIENT  





  Protocol  


 


Dextrose  25 gm  07/27/20 19:39 





  Dextrose Inj 50% Syringe (25 Gm/50 Ml)  IV  08/26/20 19:38 





  PRN PRN  





  PER PROTOCOL  





  Protocol  


 


Folic Acid  1 mg  07/29/20 10:00  08/01/20 09:15





  Folvite 1 Mg Tablet  PO  08/28/20 09:59  1 mg





  DAILY MISAEL   Administration


 


Glucagon  1 mg  07/27/20 19:39 





  Glucagen Inj 1 Mg Vial  IM  08/26/20 19:38 





  PRN PRN  





  Evaluate for BG < 70  





  Protocol  


 


Glucose  15 gm  07/27/20 19:39 





  Glutose 40% Gel 15 Gm Tube  PO  08/26/20 19:38 





  PRN PRN  





  FOR BG 50-69 IN ALERT PATIENT  





  Protocol  


 


Glucose  30 gm  07/27/20 19:39 





  Glutose 40% Gel 15 Gm Tube  PO  08/26/20 19:38 





  PRN PRN  





  FOR BG < 50 IN ALERT PATIENT   





  Protocol  


 


Hydroxyzine Pamoate  25 mg  07/29/20 16:16 





  Vistaril 25 Mg Capsule  PO  08/28/20 16:15 





  Q8HP PRN  





  ANXIETY  


 


Insulin Human Lispro  0 - 12 unit  07/28/20 16:00  08/01/20 22:09





  Humalog Insulin 100 Unit/1 Ml 3 Ml Vial  SUBCUT  08/27/20 15:59  8 unit





  ACHS MISAEL   Administration





  Protocol  


 


Insulin Human NPH  25 unit  07/29/20 09:00  08/01/20 09:14





  Humulin N (Nph) Insulin 100 Unit/1 Ml 3 Ml  SUBCUT  08/28/20 08:59  25 unit





  QAM MISAEL   Administration


 


Insulin Human NPH  20 unit  07/29/20 22:00  08/01/20 22:10





  Humulin N (Nph) Insulin 100 Unit/1 Ml 3 Ml  SUBCUT  08/28/20 21:59  20 unit





  QHS MISAEL   Administration


 


Levothyroxine Sodium  0.075 mg  07/29/20 08:30  07/31/20 05:45





  Synthroid 0.075 Mg Tablet  PO  08/28/20 08:29  0.075 mg





  MoTuWeThFr@0600 MISAEL   Administration


 


Levothyroxine Sodium  0.088 mg  08/01/20 06:00  08/01/20 05:45





  Synthroid 0.088 Mg Tablet  PO  08/31/20 05:59  0.088 mg





  SuSa@0600 MISAEL   Administration


 


Lisinopril  10 mg  08/01/20 10:00  08/01/20 09:15





  Prinivil 10 Mg Tablet  PO  08/31/20 09:59  10 mg





  DAILY MISAEL   Administration


 


Melatonin  6 mg  07/27/20 22:00  08/01/20 22:09





  Melatonin 3 Mg Tablet  PO  08/26/20 21:59  6 mg





  QHS MISAEL   Administration


 


Metoprolol Tartrate  50 mg  07/31/20 18:00  08/02/20 06:57





  Lopressor 50 Mg Tablet  PO  08/30/20 17:59  50 mg





  Q6 MISAEL   Administration


 


Multivitamins  1 tab  07/29/20 10:00  08/01/20 09:15





  Tab-A-Minna (Multiple Vitamin) Tablet  PO  08/28/20 09:59  1 tab





  DAILY MISAEL   Administration


 


Pantoprazole Sodium  40 mg  07/29/20 17:00  08/02/20 06:57





  Protonix 40 Mg Dr Tablet  PO  08/28/20 16:59  40 mg





  BID@0600,1700 MISAEL   Administration


 


Prednisone  40 mg  08/02/20 10:00 





  Deltasone 20 Mg Tablet  PO  09/01/20 09:59 





  DAILY MISAEL  


 


Promethazine HCl  12.5 mg  07/27/20 19:19  07/31/20 05:55





  Phenergan Inj 25 Mg/1 Ml Vial  IV  08/26/20 19:18  12.5 mg





  Q4HP PRN   Administration





  FOR NAUSEA/VOMITING  


 


Sodium Chloride  2.5 ml  07/27/20 22:00  08/02/20 06:57





  Saline Flush 2.5 Ml Monoject Prefil Syrin  IV  08/26/20 21:59  Not Given





  Q8 [facility]  


 


Zinc Sulfate  220 mg  07/28/20 10:00  08/01/20 09:15





  Zinc-220 Capsule  PO  08/27/20 09:59  220 mg





  DAILY MISAEL   Administration


 


Zolpidem Tartrate  5 mg  07/29/20 16:16 





  Ambien 5 Mg Tablet  PO  08/05/20 16:15 





  HSP PRN  





  SLEEP OR INSOMNIA  














Discontinued Medications














Generic Name Dose Route Start Last Admin





  Trade Name Freq  PRN Reason Stop Dose Admin


 


Albuterol  2.5 mg  07/27/20 19:19 





  Ventolin 0.083% Neb 2.5 Mg/3 Ml Ampul  NEB  08/26/20 19:18 





  RTQ4HP PRN  





  SHORTNESS OF BREATH  


 


Amlodipine Besylate  5 mg  07/29/20 10:00 





  Norvasc 5 Mg Tablet  PO  08/28/20 09:59 





  DAILY MISAEL  


 


Aspirin  81 mg  07/27/20 22:00  07/27/20 22:16





  Ecotrin 81 Mg Ec Tablet  PO  07/27/20 23:00  81 mg





  QHS MISAEL   Administration


 


Atenolol  100 mg  07/29/20 10:00  07/30/20 09:42





  Tenormin 50 Mg Tablet  PO  08/28/20 09:59  100 mg





  DAILY MISAEL   Administration


 


Atorvastatin Calcium  40 mg  07/27/20 22:00  07/27/20 22:28





  Lipitor 40 Mg Tablet  PO  07/27/20 23:00  40 mg





  QHS MISAEL   Administration


 


Atorvastatin Calcium  20 mg  07/28/20 22:00  07/30/20 22:12





  Lipitor 20 Mg Tablet  PO  08/27/20 21:59  20 mg





  QHS MISAEL   Administration


 


Azithromycin  500 mg  07/27/20 18:01  07/27/20 18:35





  Zithromax 250 Mg Tablet  PO  07/27/20 18:02  500 mg





  NOW ONE   Administration


 


Azithromycin  250 mg  07/28/20 18:00  07/30/20 17:33





  Zithromax 250 Mg Tablet  PO  08/04/20 17:59  250 mg





  QPM MISAEL   Administration


 


Dexamethasone Sodium Phosphate  2 mg  07/27/20 22:00  07/29/20 13:35





  Decadron Inj 4 Mg/Ml Vial  IV  08/26/20 21:59  2 mg





  Q8 MISAEL   Administration


 


Dexamethasone Sodium Phosphate  2 mg  07/29/20 22:00  07/30/20 09:44





  Decadron Inj 4 Mg/Ml Vial  IV  08/28/20 21:59  2 mg





  Q12 MISAEL   Administration


 


Diltiazem HCl  20 mg  07/28/20 11:21  07/28/20 11:44





  Cardizem Inj 25 Mg/5 Ml Vial  IV  07/28/20 11:22  20 mg





  NOW ONE   Administration


 


Diltiazem HCl  20 mg  07/30/20 11:30  07/30/20 12:08





  Cardizem Inj 25 Mg/5 Ml Vial  IV  07/30/20 11:31  Not Given





  NOW ONE  


 


Diltiazem HCl  25 mg  07/31/20 07:00  07/31/20 06:59





  Cardizem Inj 25 Mg/5 Ml Vial  IV  07/31/20 07:01  25 mg





  NOW ONE   Administration


 


Diltiazem HCl  Confirm  07/31/20 06:53  07/31/20 07:54





  Cardizem Inj 25 Mg/5 Ml Vial  Administered  07/31/20 06:54  Not Given





  Dose  





  25 mg  





  .ROUTE  





  .STK-MED ONE  


 


Enoxaparin Sodium  80 mg  07/27/20 22:00  08/01/20 09:13





  Lovenox Inj 80 Mg/0.8 Ml Disp.Syrin  SUBCUT  08/26/20 21:59  80 mg





  Q12 MISAEL   Administration


 


Hydralazine HCl  25 mg  07/29/20 10:00  07/31/20 10:00





  Apresoline 25 Mg Tablet  PO  08/28/20 09:59  Not Given





  Q12 MISAEL  


 


Hydrochlorothiazide  12.5 mg  07/29/20 10:00  08/01/20 09:14





  Hydrodiuril 12.5 Mg Tablet  PO  08/28/20 09:59  12.5 mg





  DAILY MISAEL   Administration


 


Sodium Chloride  1,000 mls @ 0 mls/hr  07/27/20 18:01  07/27/20 19:36





  Nacl 0.9% 1000 Ml Iv Soln  IV  07/27/20 18:02  Infused





  BOLUS ONE   Infusion





  [facility]  


 


Ceftriaxone Sodium/Dextrose  2 gm in 50 mls @ 100 mls/hr  07/27/20 18:01  

07/27/20 19:07





  Rocephin Rtu 2 Gm/D5w 50 Ml Premix Bag  IV  07/27/20 18:30  Infused





  NOW ONE   Infusion


 


Ceftriaxone Sodium/Dextrose  1 gm in 50 mls @ 100 mls/hr  07/28/20 10:00  

07/29/20 10:49





  Rocephin Rtu 1 Gm/D5w 50 Ml Premix  IV  08/04/20 09:59  Infused





  DAILY MISAEL   Infusion


 


Insulin Human Lispro  16 unit  07/28/20 12:30  07/28/20 12:24





  Humalog Insulin 100 Unit/1 Ml 3 Ml Vial  SUBCUT  07/28/20 12:31  16 unit





  NOW ONE   Administration


 


Insulin Human Lispro  Confirm  07/28/20 22:21  07/28/20 22:27





  Humalog Insulin 100 Unit/1 Ml 3 Ml Vial  Administered  07/28/20 22:22  Not 

Given





  Dose  





  1 unit  





  .ROUTE  





  .STK-MED ONE  


 


Insulin Human Lispro  15 unit  07/28/20 22:30  07/28/20 22:38





  Humalog Insulin 100 Unit/1 Ml 3 Ml Vial  SUBCUT  07/28/20 22:31  15 unit





  NOW ONE   Administration


 


Insulin Human Lispro  20 unit  07/29/20 13:30  07/29/20 13:34





  Humalog Insulin 100 Unit/1 Ml 3 Ml Vial  SUBCUT  07/29/20 13:31  20 unit





  NOW ONE   Administration


 


Insulin Human Lispro  20 unit  07/29/20 14:00  07/29/20 13:41





  Humalog Insulin 100 Unit/1 Ml 3 Ml Vial  SUBCUT  07/29/20 14:01  20 unit





  NOW ONE   Administration


 


Insulin Human NPH  20 unit  07/29/20 18:00 





  Humulin N (Nph) Insulin 100 Unit/1 Ml 3 Ml  SUBCUT  08/28/20 17:59 





  QPM [facility]  


 


Insulin Human Regular  0 - 12 unit  07/27/20 22:00  07/28/20 11:37





  Humulin R (Pyxis) Insulin 100 Unit/Ml 3ml  SUBCUT  08/26/20 21:59  Not Given





  [facility]  





  Protocol  


 


Insulin Human Regular  4 unit  07/28/20 10:30  07/28/20 09:45





  Humulin R (Pyxis) Insulin 100 Unit/Ml 3ml  SUBCUT  07/28/20 10:31  4 unit





  NOW ONE   Administration


 


Levothyroxine Sodium  0.075 mg  07/29/20 10:00 





  Synthroid 0.075 Mg Tablet  PO  08/28/20 09:59 





  MOTUWETHFR@1000 [facility]  


 


Levothyroxine Sodium  0.088 mg  08/01/20 10:00 





  Synthroid 0.088 Mg Tablet  PO  08/31/20 09:59 





  SUSA@1000 [facility]  


 


Lisinopril  10 mg  07/27/20 22:00  07/27/20 22:31





  Prinivil 10 Mg Tablet  PO  07/27/20 23:00  10 mg





  DAILY [facility]   Administration


 


Lisinopril  20 mg  07/29/20 10:00  07/31/20 10:01





  Prinivil 10 Mg Tablet  PO  08/28/20 09:59  Not Given





  DAILY [facility]  


 


Metoprolol Tartrate  25 mg  07/30/20 12:00  07/31/20 17:03





  Lopressor 25 Mg Tablet  PO  08/29/20 11:59  25 mg





  Q6 [facility]   Administration


 


Metoprolol Tartrate  Confirm  07/31/20 06:13  07/31/20 06:18





  Lopressor Inj/Pf 5 Mg/5 Ml Sdv  Administered  07/31/20 06:14  5 mg





  Dose   Administration





  5 mg  





  IV  





  .STK-MED ONE  


 


Metoprolol Tartrate  5 mg  07/31/20 06:45  07/31/20 07:54





  Lopressor Inj/Pf 5 Mg/5 Ml Sdv  IV  07/31/20 06:46  Not Given





  NOW ONE  


 


Prednisone  60 mg  07/31/20 10:00  08/01/20 09:15





  Deltasone 20 Mg Tablet  PO  08/30/20 09:59  60 mg





  DAILY MISAEL   Administration














Assessment & Plan





- Diagnosis


(1) Atrial fibrillation with RVR


Is this a current diagnosis for this admission?: Yes   


Plan: 


Doing better after her Lopressor maximized however her pulse continues to be 

uncontrolled with just mild physical activities. I took the liberty of changing 

her beta blocker to metoprolol tartrate 100 mg bid from toprol 100mg bid. At 

this point we will screen the patient for antiarrhythmic therapy and possible 

JENNIFER-guided CV.





Recommendations:


-Continue with current medical management for now.


-Continue with  Eliquis 5 mg p.o. twice daily.


-I will discuss the case with EP today.


-Further recommendations pending discussion of case with EP today.














(2) Coronary artery disease


Qualifiers: 


   Coronary Disease-Associated Artery/Lesion type: native artery   Native vs. 

transplanted heart: native heart   Associated angina: without angina   Qualified

Code(s): I25.10 - Atherosclerotic heart disease of native coronary artery 

without angina pectoris   


Is this a current diagnosis for this admission?: Yes   


Plan: 


The patient underwent stenting of the proximal right coronary artery in May 

2002.  She is currently free of ischemic symptoms however she does have lower 

extremity edema which she attributes to increased doses of Norvasc.  She has 

remained free of ischemic and heart failure symptoms and without evidence of 

heart failure by exam or radiographic assessment.





Recommendations:


-Discontinue hydralazine.


-Uptitrate lisinopril as indicated to keep systolic blood pressure to 130/80 or 

below.


-Strict intake and output.


-Minimize IV fluids.


-Low sodium/low cholesterol diet.


-Increase Lipitor to 80 mg daily.


-We will continue to follow-up with you.








(3) Hypertension


Qualifiers: 


   Hypertension type: essential hypertension   Qualified Code(s): I10 - 

Essential (primary) hypertension   


Is this a current diagnosis for this admission?: Yes   


Plan: 


Her blood pressure is at goal.  I will defer further management to the primary 

team.

## 2020-08-03 LAB
ANION GAP SERPL CALC-SCNC: 5 MMOL/L (ref 5–19)
BUN SERPL-MCNC: 21 MG/DL (ref 7–20)
CALCIUM: 8.9 MG/DL (ref 8.4–10.2)
CHLORIDE SERPL-SCNC: 99 MMOL/L (ref 98–107)
CO2 SERPL-SCNC: 29 MMOL/L (ref 22–30)
ERYTHROCYTE [DISTWIDTH] IN BLOOD BY AUTOMATED COUNT: 15.3 % (ref 11.5–14)
GLUCOSE SERPL-MCNC: 55 MG/DL (ref 75–110)
HCT VFR BLD CALC: 29.7 % (ref 36–47)
HGB BLD-MCNC: 10.1 G/DL (ref 12–15.5)
MCH RBC QN AUTO: 28.2 PG (ref 27–33.4)
MCHC RBC AUTO-ENTMCNC: 34 G/DL (ref 32–36)
MCV RBC AUTO: 83 FL (ref 80–97)
PLATELET # BLD: 229 10^3/UL (ref 150–450)
POTASSIUM SERPL-SCNC: 3.8 MMOL/L (ref 3.6–5)
RBC # BLD AUTO: 3.59 10^6/UL (ref 3.72–5.28)
WBC # BLD AUTO: 10.2 10^3/UL (ref 4–10.5)

## 2020-08-03 PROCEDURE — 5A2204Z RESTORATION OF CARDIAC RHYTHM, SINGLE: ICD-10-PCS | Performed by: INTERNAL MEDICINE

## 2020-08-03 PROCEDURE — B24BZZ4 ULTRASONOGRAPHY OF HEART WITH AORTA, TRANSESOPHAGEAL: ICD-10-PCS | Performed by: INTERNAL MEDICINE

## 2020-08-03 RX ADMIN — LEVOTHYROXINE SODIUM SCH MG: 75 TABLET ORAL at 05:38

## 2020-08-03 RX ADMIN — OXYCODONE HYDROCHLORIDE AND ACETAMINOPHEN SCH MG: 500 TABLET ORAL at 17:06

## 2020-08-03 RX ADMIN — MELATONIN SCH MG: 3 TAB ORAL at 21:58

## 2020-08-03 RX ADMIN — Medication SCH ML: at 17:07

## 2020-08-03 RX ADMIN — INSULIN LISPRO SCH UNIT: 100 INJECTION, SOLUTION INTRAVENOUS; SUBCUTANEOUS at 21:57

## 2020-08-03 RX ADMIN — METOPROLOL TARTRATE SCH MG: 100 TABLET, FILM COATED ORAL at 09:53

## 2020-08-03 RX ADMIN — Medication SCH: at 21:38

## 2020-08-03 RX ADMIN — PANTOPRAZOLE SODIUM SCH: 40 TABLET, DELAYED RELEASE ORAL at 05:39

## 2020-08-03 RX ADMIN — OXYCODONE HYDROCHLORIDE AND ACETAMINOPHEN SCH MG: 500 TABLET ORAL at 09:53

## 2020-08-03 RX ADMIN — Medication SCH: at 05:39

## 2020-08-03 RX ADMIN — INSULIN LISPRO SCH UNIT: 100 INJECTION, SOLUTION INTRAVENOUS; SUBCUTANEOUS at 17:04

## 2020-08-03 RX ADMIN — CITALOPRAM HYDROBROMIDE SCH MG: 20 TABLET ORAL at 09:53

## 2020-08-03 RX ADMIN — FOLIC ACID SCH MG: 1 TABLET ORAL at 09:53

## 2020-08-03 RX ADMIN — PANTOPRAZOLE SODIUM SCH MG: 40 TABLET, DELAYED RELEASE ORAL at 17:06

## 2020-08-03 RX ADMIN — METOPROLOL TARTRATE SCH MG: 100 TABLET, FILM COATED ORAL at 21:58

## 2020-08-03 RX ADMIN — INSULIN HUMAN SCH UNIT: 100 INJECTION, SUSPENSION SUBCUTANEOUS at 21:58

## 2020-08-03 RX ADMIN — LISINOPRIL SCH MG: 10 TABLET ORAL at 09:52

## 2020-08-03 RX ADMIN — Medication SCH TAB: at 21:58

## 2020-08-03 RX ADMIN — INSULIN HUMAN SCH: 100 INJECTION, SUSPENSION SUBCUTANEOUS at 07:39

## 2020-08-03 RX ADMIN — INSULIN LISPRO SCH: 100 INJECTION, SOLUTION INTRAVENOUS; SUBCUTANEOUS at 12:33

## 2020-08-03 RX ADMIN — APIXABAN SCH MG: 5 TABLET, FILM COATED ORAL at 17:06

## 2020-08-03 RX ADMIN — INSULIN LISPRO SCH: 100 INJECTION, SOLUTION INTRAVENOUS; SUBCUTANEOUS at 07:28

## 2020-08-03 RX ADMIN — ASPIRIN SCH MG: 81 TABLET, COATED ORAL at 09:53

## 2020-08-03 RX ADMIN — Medication SCH MG: at 09:52

## 2020-08-03 RX ADMIN — VITAMIN D, TAB 1000IU (100/BT) SCH UNIT: 25 TAB at 09:52

## 2020-08-03 RX ADMIN — PREDNISONE SCH MG: 20 TABLET ORAL at 09:52

## 2020-08-03 RX ADMIN — MULTIVITAMIN TABLET SCH TAB: TABLET at 09:53

## 2020-08-03 RX ADMIN — APIXABAN SCH MG: 5 TABLET, FILM COATED ORAL at 09:53

## 2020-08-03 NOTE — XCELERA REPORT
Study ID: [id number]

                           [address] [address]

                               Tel: [phone]

                               Fax: [phone]



                     Transesophageal Echocardiogram Report

_______________________________________________________________________________



Name: NADEEN BARRIOS

MRN: [id number]                            Age: 64 yrs

Gender: Female                             : 1955

Patient Status: Inpatient                  Patient Location: [address] [place] [address]

Account #: [id number]

Study Date: 2020 12:09 PM

History: Atrial fibrillation

Accession #: [id number]

_______________________________________________________________________________



Height: 62 in        Weight: 164 lb        BSA: 1.8 m2

_______________________________________________________________________________

Reason For Study: JENNIFER guidede cardioversion

Ordering Physician: MADAY HILARIO

Performed By: Sandra Jenkins



_______________________________________________________________________________



Interpretation Summary

Left ventricular systolic function is low normal.

Ejection Fraction = 50-55%.

The right ventricle is normal in size and function.

There is mild to moderate mitral regurgitation.

There is trace tricuspid regurgitation.

No thrombus is detected in the left atrial appendage.

No hemodynamically significant valvular aortic stenosis.

There is no pericardial effusion.



Procedure

A complete two-dimensional transesophageal echocardiogram was performed (2D,

spectral and color flow Doppler). Informed consent for Transesophageal

Echocardiogram, and use of a contrast agent as needed, was obtained prior to

the procedure. The patient was brought to the OR in a fasting state. An

intravenous line was placed. A topical anesthetic agent was used for

oropharangeal anesthesia. A bite block was inserted. IV conscious sedation was

administered using per anesthesia. The patient's vital signs, including blood

pressure, heart rate, pulse oximetry and cardiac rhythm were monitored

thoughout the procedure. The transesophageal probe was passed without

difficulty. The usual views were obtained; basal, mid-esophageal, transgastric

and aortic views. The patient tolerated the procedure well without evidence of

orophangeal or esophageal trauma.





Left Ventricle

The left ventricle is grossly normal size. There is no thrombus. There is

normal left ventricular wall thickness. Ejection Fraction = 50-55%. Left

ventricular systolic function is low normal. No regional wall motion

abnormalities noted.



Right Ventricle

The right ventricle is normal in size and function.



Atria

The interatrial septum is intact with no evidence for an atrial septal defect.

The left atrium is mildly dilated. No thrombus is detected in the left atrial

appendage. LA velocities are diminished. Right atrial size is normal.



Mitral Valve

The mitral valve is grossly normal. There is no mitral valve stenosis. There

is mild to moderate mitral regurgitation.





Tricuspid Valve

The tricuspid valve is normal in structure and function. There is trace

tricuspid regurgitation.



Aortic Valve

The aortic valve is trileaflet. The aortic valve opens well. No

hemodynamically significant valvular aortic stenosis. No aortic regurgitation

is present.



Pulmonic Valve

The pulmonic valve is not well visualized.



Arteries

The aortic root is not well visualized but is probably normal size. Mild

atherosclerotic plaque(s) in the descending aorta.





Pericardium

There is no pericardial effusion.



_______________________________________________________________________________

_______________________________________________________________________________



Electronically signed by:      Ramiro Ta      on 2020 07:10 PM



CC: MADAY HILARIO Anil

## 2020-08-03 NOTE — EKG REPORT
SEVERITY:- ABNORMAL ECG -

ATRIAL FIBRILLATION, V-RATE 

ABERRANT COMPLEX, POSSIBLY SUPRAVENTRICULAR

PROBABLE LVH WITH SECONDARY REPOL ABNRM

:

Confirmed by: Ron Feliz MD 03-Aug-2020 12:26:39

## 2020-08-03 NOTE — PROGRESS NOTE
Provider Note


Provider Note: 





DIRECT CURRENT CARDIOVERSION


Date : 08/03/2020


Referring physician: Dr. Dawood Collins


Procedure: Direct current cardioversion


Anesthesia: General anesthesia


Indication: Atrial fibrillation with rapid ventricular response





Clinical history


64-year-old lady with new onset atrial fibrillation with rapid ventricular 

response. She has CAD with stents from 2002( RCA) and carries a diagnosis of 

COPD and DM. JENNIFER Cardioverison is requested. JENNIFER was done initially and was 

negative for LA thrombus. We decided to proceed with direct current 

cardioversion.





PROCEDURE


The patient was brought to the holding area in a fasting and non-sedated state. 

Informed consent was obtained prior to the procedure.  General anesthesia was 

administered.  Please see anesthesia notes for medication details.


Patient's presenting rhythm was atrial fibrillation with rapid ventricular 

response.  The patient's EKG and vital signs were monitored throughout.  Once 

the patient was comfortably sedated a single direct current bi-phasic 200 J 

shock was administered across defibrillator patches applied in melly-posterior 

fashion across the chest.  This resulted in prompt restoration of sinus rhythm. 

The patient tolerated the procedure well.





Conclusion


Successful conversion of atrial fibrillation to sinus rhythm





Plan


Continue systemic anticoagulation-apixaban 5 mg twice daily without interruption


Continue other medications

## 2020-08-03 NOTE — PDOC PROGRESS REPORT
Subjective


Progress Note for:: 08/03/20


Subjective:: 


Patient was seen on morning rounds, prior to JENINFER/Cardioversion, with her 

present.  She is found resting in bed, comfortably, on room air.  Feels well 

today.  No further palpations but does continue to have a variable HR (heart 

rate 120s 140s with minimal activity).  No chest pain or dyspnea.


She further denies fever, chills, chest pain, dyspnea, orthopnea, cough, 

dyspepsia, abdominal pain, nausea, vomiting.


Discussed planned JENNIFER and cardioversion today; comfortable with plan as she has 

reviewed details w/ Dr. Haro this morning.  She has no other questions or 

concerns at this time.


No concerns per nursing.


Reason For Visit: 


COPD EXACERBATION,CORONARY ARTERY DISEASE,HYPERTEN








Physical Exam


Vital Signs: 


                                        











Temp Pulse Resp BP Pulse Ox


 


 98.1 F   56 L  17   117/56 L  94 


 


 08/03/20 14:33  08/03/20 14:33  08/03/20 14:33  08/03/20 14:33  08/03/20 14:33








                                 Intake & Output











 08/02/20 08/03/20 08/04/20





 06:59 06:59 06:59


 


Intake Total 905 1440 400


 


Balance 905 1440 400


 


Weight 74.5 kg 75.1 kg 











General appearance: PRESENT: no acute distress, cooperative, well-developed, 

well-nourished


Head exam: PRESENT: atraumatic, normocephalic


Eye exam: PRESENT: conjunctiva pink, EOMI, PERRLA.  ABSENT: scleral icterus


Mouth exam: PRESENT: moist, tongue midline


Neck exam: ABSENT: carotid bruit, JVD, lymphadenopathy, thyromegaly


Respiratory exam: PRESENT: clear to auscultation steven, symmetrical, unlabored.  

ABSENT: rales, rhonchi, wheezes


Cardiovascular exam: PRESENT: irregular rhythm, +S1, +S2, tachycardia.  ABSENT: 

diastolic murmur, rubs, systolic murmur


Pulses: PRESENT: normal dorsalis pedis pul


Vascular exam: PRESENT: normal capillary refill


Extremities exam: PRESENT: full ROM.  ABSENT: calf tenderness, clubbing, pedal 

edema


Musculoskeletal exam: PRESENT: ambulatory


Neurological exam: PRESENT: alert, awake, oriented to person, oriented to place,

oriented to time, oriented to situation, CN II-XII grossly intact.  ABSENT: 

motor sensory deficit


Psychiatric exam: PRESENT: appropriate affect, normal mood.  ABSENT: homicidal 

ideation, suicidal ideation


Skin exam: PRESENT: dry, intact, warm.  ABSENT: cyanosis, rash





Results


Laboratory Results: 


                                        





                                 08/03/20 05:09 





                                 08/03/20 05:09 





                                        











  08/03/20 08/03/20





  05:09 05:09


 


WBC  10.2 


 


RBC  3.59 L 


 


Hgb  10.1 L 


 


Hct  29.7 L 


 


MCV  83 


 


MCH  28.2 


 


MCHC  34.0 


 


RDW  15.3 H 


 


Plt Count  229 


 


Sodium   132.7 L


 


Potassium   3.8


 


Chloride   99


 


Carbon Dioxide   29


 


Anion Gap   5


 


BUN   21 H


 


Creatinine   0.81


 


Est GFR (African Amer)   > 60


 


Glucose   55 L


 


Calcium   8.9











Impressions: 


                                        





Chest/Abdomen CTA  07/27/20 18:00


IMPRESSION: 


 


Mild emphysema.


 








Chest CT  07/31/20 00:00


IMPRESSION:


 


No acute abnormality within the chest.


 


Subacute fracture involving the left anterior fifth rib.


 


3.0 mm solid pulmonary nodule within the upper lobe. If patient


is low risk for malignancy, no routine follow-up imaging is


recommended; if patient is high risk for malignancy, a


non-contrast Chest CT at 12 months is optional. If performed and


the nodule is stable at 12 months, no further follow-up is


recommended.


These guidelines do not apply to immunocompromised patients and


patients with cancer. Follow up in patients with significant


comorbidities as clinically warranted. For lung cancer screening,


adhere to Lung-RADS guidelines. Reference: Radiology. 2017;


284(1):228-43.


 


Splenic varices with splenorenal shunt.


 


TECHNICAL DOCUMENTATION:


 


Quality ID # [id number]: Final reports with documentation of one or more


dose reduction techniques (e.g., Automated exposure control,


adjustment of the mA and/or kV according to patient size, use of


iterative reconstruction technique)


 


copyright 2011 [employer]- All Rights Reserved


 








Chest X-Ray  07/31/20 00:00


IMPRESSION:  No interval change in the chest.


 














Assessment and Plan





- Diagnosis


(1) Atrial fibrillation with RVR


Is this a current diagnosis for this admission?: Yes   


Plan: 


Improved rate control; not at goal.


Atrial fibrillation with RVR.





Cardiology was consulted; appreciate Dr. Haro's assistance


Continue metoprolol 100 mg every 12 hours.


Continue Eliquis.


Planned JENNIFER/Cardioversion by Dr. Ta today








(2) COPD (chronic obstructive pulmonary disease) with chronic bronchitis


Is this a current diagnosis for this admission?: Yes   


Plan: 


Stable and without exacerbation at this time.


The patient reports COPD.  She states that she has not had an exacerbation in a 

long time.  She does have an albuterol rescue inhaler at home but has been not 

on any chronic daily inhaler therapy.  She does not use oxygen at home.








(3) Coronary artery disease


Qualifiers: 


   Coronary Disease-Associated Artery/Lesion type: native artery   Native vs. 

transplanted heart: native heart   Associated angina: without angina   Qualified

Code(s): I25.10 - Atherosclerotic heart disease of native coronary artery 

without angina pectoris   


Is this a current diagnosis for this admission?: Yes   


Plan: 


The patient states that she had a stent placed approximately 10 years ago or 

more.  She was following with cardiology but then decided to change 

practitioners.  





Continue aspirin and statin therapy.


Antihypertensive as above.


Cardiac diet.








(4) Depression


Qualifiers: 


   Depression Type: unspecified   Qualified Code(s): F32.9 - Major depressive 

disorder, single episode, unspecified   


Is this a current diagnosis for this admission?: Yes   


Plan: 


Home dose Celexa.


Vistaril as needed anxiety








(5) Diabetes mellitus


Qualifiers: 


   Diabetes mellitus type: type 1   Diabetes mellitus complication status: 

without complication   Qualified Code(s): E10.9 - Type 1 diabetes mellitus 

without complications   


Is this a current diagnosis for this admission?: Yes   


Plan: 


Improved glucose control off steroids.


A1c 6.4%.


Resume home dose NPH.


Accu-Cheks before meals and at bedtime with Humalog per sliding scale coverage.


Hypoglycemia protocol.


Cardiac/consistent carb diet.








(6) Elevated d-dimer


Is this a current diagnosis for this admission?: Yes   


Plan: 


CTA chest negative for pulmonary embolus.


Improving; d-dimer down to 1.47





Now on Eliquis








(7) Elevated liver function tests


Is this a current diagnosis for this admission?: Yes   


Plan: 


Resolved.


She reports rare to occasional glasses of wine.  No other indication for 

elevated transaminases.  








(8) Fever


Qualifiers: 


   Fever type: unspecified   Qualified Code(s): R50.9 - Fever, unspecified   


Is this a current diagnosis for this admission?: Yes   


Plan: 


Resolved.





Blood cultures (1/4 bottles) shows coag negative staph; contaminant.


Antibiotics as below








(9) Hypertension


Qualifiers: 


   Hypertension type: essential hypertension   Qualified Code(s): I10 - 

Essential (primary) hypertension   


Is this a current diagnosis for this admission?: Yes   


Plan: 


Well-controlled at present.  Currently on metoprolol 100 mg every 12 and 

lisinopril 10 mg daily.


Cardiac diet.








(10) Hyponatremia


Is this a current diagnosis for this admission?: Yes   


Plan: 


Sodium trending up; 125.3-> 122.5-> 125.1-> 132.7





Liberalize dietary sodium.


Fluid restrict 1.2 L/daily


Follow up chemistry.








(11) Hypothyroidism, postsurgical


Is this a current diagnosis for this admission?: Yes   


Plan: 


Continue home dose levothyroxine.








(12) Suspected COVID-19 virus infection


Is this a current diagnosis for this admission?: Yes   


Plan: 


Ruled Out


Rapid and send out COVID-19 tests are negative.


However, she has numerous laboratory results supportive of COVID-19 (elevated d-

dimer, transaminases, CRP, LDH, ferritin) that can not be explained otherwise.


CTA negative for pulmonary embolus and pneumonia.


COPD exacerbation is not suspected (no wheezing on exam) and certainly would not

be the cause of the above-mentioned laboratory changes.


Chest U/S benign


Chest CT w/ negative for acute findings; specifically no ground glass or 

infiltrates noted.





D-dimer improved (1.47)


CRP equivocal (132.7


Ferritin increased (581)


LDH increased (289)





Due to elevated d-dimer, she was placed on full dose Lovenox.  


Received 3 days of Rocephin


Completed 5 day course of Azithromycin.


Transitioned to p.o. prednisone; weaning


Continue vitamin C, vitamin D, zinc, and melatonin supplementation.


Encourage pulmonary toilet.


Contact and droplet precautions.





Patient and family with, understandable, high level of frustration/anxiety 

regarding unclear dx.  Dr. Farley provided bedside eval w/ US.  B lines noted; 

recommended follow up CT Chest to evaluate for ground glass changes.  


As both are negative, effectively r/o COVID.








- Time


Time Spent with patient: 25-34 minutes


Medications reviewed and adjusted accordingly: Yes


Anticipated Discharge Disposition: Home, Self Care


Anticipated Discharge Timeframe: within 24 hours - pending Cardiac clearance

## 2020-08-03 NOTE — PDOC PROGRESS REPORT
Subjective


Progress Note for:: 08/03/20


Subjective:: 





NADEEN BARRIOS is a 64 year old female with a history of coronary artery 

disease status post PCI to the proximal RCA with a 4.0 mm x 18 mm Elite stent in

May 2002, palpitations, PACs in the past, COPD, hyperlipidemia, hypertension, 

depression, diabetes mellitus and hyponatremia who was admitted to [facility] on 

07/27/2020 with several weeks of increasing shortness of breath and cough as 

well as fevers and who is consulted to our service for rapid atrial 

fibrillation.  The hospitalist service has been treating her for COVID-19 as she

clinically is having all symptoms and signs of COVID infection despite negative 

testing.  The patient had been responding appropriately to treatment for her 

pulmonary condition however began to have episodes of paroxysmal atrial fibri

llation recently and, as of yesterday, had remained in rapid atrial 

fibrillation.  She was begun on Lopressor 25 p.o. every 6 with some improvement 

in her pulse.  This morning she is found in bed, resting comfortably although 

she complains of shortness of breath and racing heart.  She feels hot to the 

touch.





08/03/2020:


The patient had an eventful night and remains asymptomatic from the cardiovas

cular standpoint.  Unfortunately her telemetry continues to show rapid atrial 

fibrillation reason why she had been set up for JENNIFER guided cardioversion today.





Physical exam on 08/03/2020:


GENERAL:  Pleasant and conversational.  Oriented x3 with normal mood.  Not in 

acute distress however she is speaking in short sentences and appears to be 

short of breath.


HEENT:  Normocephalic, atraumatic.  Pupils equal.   Sclerae anicteric.  

Oropharynx moist. 


NECK:  No JVD.  No carotid bruits. 


LUNGS:  Clear to auscultation bilaterally, decreased breath sounds bilaterally. 

Normal respiratory effort without the use of accessory muscles or intercostal 

retractions.  


CARDIOVASCULAR: Irregular rate and rhythm, normal S1 and S2 without murmurs, 

rubs, or gallops.  PMI not displaced.


ABDOMEN: No masses or tenderness to palpation.  No bruit.  No splenomegaly or 

hepatomegaly. No abdominal aorta bruit noted.


EXTREMITIES: 1+ pitting edema bilaterally, no cyanosis, no clubbing.  +2 pulses 

femoral and pedal pulses bilaterally.  


SKIN: No lesions or rashes.


MUSCULOSKELETAL:  No chest tenderness to palpation. 


NEUROLOGIC: Nonfocal.  No gross sensory or motor deficits bilateral upper or 

lower extremities.





Reason For Visit: 


COPD EXACERBATION,CORONARY ARTERY DISEASE,HYPERTEN








Physical Exam


Vital Signs: 


                                        











Temp Pulse Resp BP Pulse Ox


 


 98.9 F   125 H  20   132/74 H  99 


 


 08/03/20 04:00  08/03/20 07:00  08/03/20 04:00  08/03/20 04:00  08/03/20 04:00








                                 Intake & Output











 08/02/20 08/03/20 08/04/20





 06:59 06:59 06:59


 


Intake Total 905 1440 


 


Balance 905 1440 


 


Weight 74.5 kg 75.1 kg 














Results


Laboratory Results: 


                                        





                                 08/03/20 05:09 





                                 08/03/20 05:09 





                                        











  08/03/20 08/03/20





  05:09 05:09


 


WBC  10.2 


 


RBC  3.59 L 


 


Hgb  10.1 L 


 


Hct  29.7 L 


 


MCV  83 


 


MCH  28.2 


 


MCHC  34.0 


 


RDW  15.3 H 


 


Plt Count  229 


 


Sodium   132.7 L


 


Potassium   3.8


 


Chloride   99


 


Carbon Dioxide   29


 


Anion Gap   5


 


BUN   21 H


 


Creatinine   0.81


 


Est GFR (African Amer)   > 60


 


Glucose   55 L


 


Calcium   8.9











Impressions: 


                                        





Chest/Abdomen CTA  07/27/20 18:00


IMPRESSION: 


 


Mild emphysema.


 








Chest CT  07/31/20 00:00


IMPRESSION:


 


No acute abnormality within the chest.


 


Subacute fracture involving the left anterior fifth rib.


 


3.0 mm solid pulmonary nodule within the upper lobe. If patient


is low risk for malignancy, no routine follow-up imaging is


recommended; if patient is high risk for malignancy, a


non-contrast Chest CT at 12 months is optional. If performed and


the nodule is stable at 12 months, no further follow-up is


recommended.


These guidelines do not apply to immunocompromised patients and


patients with cancer. Follow up in patients with significant


comorbidities as clinically warranted. For lung cancer screening,


adhere to Lung-RADS guidelines. Reference: Radiology. 2017;


284(1):228-43.


 


Splenic varices with splenorenal shunt.


 


TECHNICAL DOCUMENTATION:


 


Quality ID # 436: Final reports with documentation of one or more


dose reduction techniques (e.g., Automated exposure control,


adjustment of the mA and/or kV according to patient size, use of


iterative reconstruction technique)


 


copyright 2011 [employer]- All Rights Reserved


 








Chest X-Ray  07/31/20 00:00


IMPRESSION:  No interval change in the chest.


 








                                        





                                 08/03/20 05:09 





                                 08/03/20 05:09 





                                        











MCV  83 fl (80-97)   08/03/20  05:09    


 


MCH  28.2 pg (27.0-33.4)   08/03/20  05:09    


 


MCHC  34.0 g/dL (32.0-36.0)   08/03/20  05:09    


 


RDW  15.3 % (11.5-14.0)  H  08/03/20  05:09    


 


Seg Neutrophils %  86.0 % (42-78)  H  07/28/20  05:06    


 


Chloride  99 mmol/L ()   08/03/20  05:09    


 


Carbon Dioxide  29 mmol/L (22-30)   08/03/20  05:09    


 


Anion Gap  5  (5-19)   08/03/20  05:09    


 


Est GFR ( Amer)  > 60  (>60)   08/03/20  05:09    


 


Est GFR (Non-Af Amer)  Cancelled   07/27/20  15:01    


 


Glucose  55 mg/dL ()  L  08/03/20  05:09    


 


Calcium  8.9 mg/dL (8.4-10.2)   08/03/20  05:09    


 


Phosphorus  3.2 mg/dL (2.5-4.5)   07/28/20  05:06    


 


Magnesium  1.5 mg/dL (1.6-2.3)  L  07/28/20  05:06    


 


Ferritin  581.00 ng/mL (11.1-264.0)  H  07/29/20  05:14    


 


Total Bilirubin  0.6 mg/dL (0.2-1.3)   07/27/20  16:25    


 


AST  84 U/L (14-36)  H  07/27/20  16:25    


 


Alkaline Phosphatase  69 U/L ()   07/27/20  16:25    


 


C-Reactive Protein  132.7 mg/L (<10.0)  H  07/29/20  05:14    


 


Total Protein  7.9 g/dL (6.3-8.2)   07/27/20  16:25    


 


Albumin  4.4 g/dL (3.5-5.0)   07/27/20  16:25    


 


Triglycerides  105 mg/dL (<150)   07/28/20  05:06    


 


Cholesterol  122.99 mg/dL (0-200)   07/28/20  05:06    


 


LDL Cholesterol Direct  46 mg/dL (<100)   07/28/20  05:06    


 


VLDL Cholesterol  21.0 mg/dL (10-31)   07/28/20  05:06    


 


HDL Cholesterol  52 mg/dL (>40)   07/28/20  05:06    


 


TSH  0.13 uIU/mL (0.47-4.68)  L  07/28/20  05:06    


 


Free T4  1.45 ng/dL (0.78-2.19)   07/28/20  05:06    


 


Free T3 pg/mL  1.81 pg/mL (2.77-5.27)  L  07/28/20  05:06    


 


Urine Color  YELLOW   08/01/20  03:30    


 


Urine Appearance  CLEAR   08/01/20  03:30    


 


Urine pH  6.0  (5.0-9.0)   08/01/20  03:30    


 


Ur Specific Gravity  1.038   08/01/20  03:30    


 


Urine Protein  100 mg/dL (NEGATIVE)  H  08/01/20  03:30    


 


Urine Glucose (UA)  50 mg/dL (NEGATIVE)  H  08/01/20  03:30    


 


Urine Ketones  NEGATIVE mg/dL (NEGATIVE)   08/01/20  03:30    


 


Urine Blood  NEGATIVE  (NEGATIVE)   08/01/20  03:30    


 


Urine Nitrite  NEGATIVE  (NEGATIVE)   08/01/20  03:30    


 


Ur Leukocyte Esterase  NEGATIVE  (NEGATIVE)   08/01/20  03:30    


 


Urine WBC (Auto)  1 /HPF  08/01/20  03:30    


 


Urine RBC (Auto)  1 /HPF  08/01/20  03:30    








                             Current Medication List











Generic Name Dose Route Start Last Admin





  Trade Name Freq  PRN Reason Stop Dose Admin


 


Acetaminophen  650 mg  07/27/20 19:19 





  Tylenol 325 Mg Tablet  PO  08/26/20 19:18 





  Q4HP PRN  





  FOR PAIN OR TEMP  


 


Al Hydrox/Mg Hydrox/Simethicone  15 ml  07/27/20 19:19 





  Maalox Plus Susp 30 Udcup  PO  08/26/20 19:18 





  Q6HP PRN  





  HEARTBURN  


 


Albuterol  2 puff  07/27/20 19:48 





  Proair Hfa Inhalation Aerosol 8.5 Gm Mdi  IH  08/26/20 19:47 





  Q4HP PRN  





  WHEEZING  


 


Apixaban  5 mg  08/01/20 18:00  08/02/20 17:12





  Eliquis 5 Mg Tablet  PO  08/31/20 17:59  5 mg





  BID MISAEL   Administration


 


Ascorbic Acid  500 mg  07/28/20 10:00  08/02/20 17:12





  Vitamin C 500 Mg Tablet  PO  08/27/20 09:59  500 mg





  BID MISAEL   Administration


 


Aspirin  81 mg  07/29/20 10:00  08/02/20 09:47





  Ecotrin 81 Mg Ec Tablet  PO  08/28/20 09:59  81 mg





  DAILY MISAEL   Administration


 


Atorvastatin Calcium  80 mg  07/31/20 22:00  08/02/20 23:26





  Lipitor 20 Mg Tablet  PO  08/30/20 21:59  80 mg





  QHS MISAEL   Administration


 


Calcium Carbonate  1 tab  07/28/20 22:00  08/02/20 23:26





  Caltrate 600-Vit D3 400 Tablet  PO  08/27/20 21:59  1 tab





  QHS MISAEL   Administration


 


Cetirizine HCl  10 mg  07/28/20 20:25 





  Zyrtec 10 Mg Tablet  PO  08/27/20 20:24 





  HSP PRN  





  FOR ALLERGIES  


 


Cholecalciferol  2,000 unit  07/28/20 10:00  08/02/20 09:46





  Vitamin D3 1000 Unit Tablet  PO  08/27/20 09:59  2,000 unit





  DAILY MISAEL   Administration


 


Citalopram Hydrobromide  10 mg  07/29/20 10:00  08/02/20 09:46





  Celexa 20 Mg Tablet  PO  08/28/20 09:59  10 mg





  DAILY MISAEL   Administration


 


Dextrose  12.5 gm  07/27/20 19:39  08/03/20 05:38





  Dextrose Inj 50% Syringe (25 Gm/50 Ml)  IV  08/26/20 19:38  12.5 gm





  PRN PRN   Administration





  FOR BG 50-69 IN ALERT PATIENT  





  Protocol  


 


Dextrose  25 gm  07/27/20 19:39 





  Dextrose Inj 50% Syringe (25 Gm/50 Ml)  IV  08/26/20 19:38 





  PRN PRN  





  PER PROTOCOL  





  Protocol  


 


Folic Acid  1 mg  07/29/20 10:00  08/02/20 09:47





  Folvite 1 Mg Tablet  PO  08/28/20 09:59  1 mg





  DAILY MISAEL   Administration


 


Glucagon  1 mg  07/27/20 19:39 





  Glucagen Inj 1 Mg Vial  IM  08/26/20 19:38 





  PRN PRN  





  Evaluate for BG < 70  





  Protocol  


 


Glucose  15 gm  07/27/20 19:39 





  Glutose 40% Gel 15 Gm Tube  PO  08/26/20 19:38 





  PRN PRN  





  FOR BG 50-69 IN ALERT PATIENT  





  Protocol  


 


Glucose  30 gm  07/27/20 19:39 





  Glutose 40% Gel 15 Gm Tube  PO  08/26/20 19:38 





  PRN PRN  





  FOR BG < 50 IN ALERT PATIENT   





  Protocol  


 


Hydroxyzine Pamoate  25 mg  07/29/20 16:16 





  Vistaril 25 Mg Capsule  PO  08/28/20 16:15 





  Q8HP PRN  





  ANXIETY  


 


Insulin Human Lispro  0 - 12 unit  07/28/20 16:00  08/03/20 07:28





  Humalog Insulin 100 Unit/1 Ml 3 Ml Vial  SUBCUT  08/27/20 15:59  Not Given





  ACHS MISAEL  





  Protocol  


 


Insulin Human NPH  25 unit  07/29/20 09:00  08/02/20 09:45





  Humulin N (Nph) Insulin 100 Unit/1 Ml 3 Ml  SUBCUT  08/28/20 08:59  25 unit





  QAM MISAEL   Administration


 


Insulin Human NPH  20 unit  07/29/20 22:00  08/02/20 23:27





  Humulin N (Nph) Insulin 100 Unit/1 Ml 3 Ml  SUBCUT  08/28/20 21:59  1 unit





  QHS MISAEL   Administration


 


Levothyroxine Sodium  0.075 mg  07/29/20 08:30  08/03/20 05:38





  Synthroid 0.075 Mg Tablet  PO  08/28/20 08:29  0.075 mg





  MoTuWeThFr@0600 MISAEL   Administration


 


Levothyroxine Sodium  0.088 mg  08/01/20 06:00  08/02/20 07:00





  Synthroid 0.088 Mg Tablet  PO  08/31/20 05:59  0.088 mg





  SuSa@0600 MISAEL   Administration


 


Lisinopril  10 mg  08/01/20 10:00  08/02/20 09:46





  Prinivil 10 Mg Tablet  PO  08/31/20 09:59  10 mg





  DAILY MISAEL   Administration


 


Melatonin  6 mg  07/27/20 22:00  08/02/20 23:26





  Melatonin 3 Mg Tablet  PO  08/26/20 21:59  6 mg





  QHS MISAEL   Administration


 


Metoprolol Tartrate  100 mg  08/02/20 10:00  08/02/20 23:26





  Lopressor 100 Mg Tablet  PO  09/01/20 09:59  100 mg





  Q12 MISAEL   Administration


 


Metoprolol Tartrate  2.5 mg  08/02/20 18:26  08/02/20 18:57





  Lopressor Inj/Pf 5 Mg/5 Ml Sdv  IV  09/01/20 18:25  2.5 mg





  Q6HP PRN   Administration





  Sustained HR >140  


 


Multivitamins  1 tab  07/29/20 10:00  08/02/20 09:46





  Tab-A-Minna (Multiple Vitamin) Tablet  PO  08/28/20 09:59  1 tab





  DAILY MISAEL   Administration


 


Pantoprazole Sodium  40 mg  07/29/20 17:00  08/03/20 05:39





  Protonix 40 Mg Dr Tablet  PO  08/28/20 16:59  Not Given





  BID@0600,1700 MISAEL  


 


Prednisone  40 mg  08/02/20 10:00  08/02/20 09:46





  Deltasone 20 Mg Tablet  PO  09/01/20 09:59  40 mg





  DAILY MISAEL   Administration


 


Promethazine HCl  12.5 mg  07/27/20 19:19  07/31/20 05:55





  Phenergan Inj 25 Mg/1 Ml Vial  IV  08/26/20 19:18  12.5 mg





  Q4HP PRN   Administration





  FOR NAUSEA/VOMITING  


 


Sodium Chloride  2.5 ml  07/27/20 22:00  08/03/20 05:39





  Saline Flush 2.5 Ml Monoject Prefil Syrin  IV  08/26/20 21:59  Not Given





  Q8 [facility]  


 


Zinc Sulfate  220 mg  07/28/20 10:00  08/02/20 09:47





  Zinc-220 Capsule  PO  08/27/20 09:59  220 mg





  DAILY MISAEL   Administration


 


Zolpidem Tartrate  5 mg  07/29/20 16:16 





  Ambien 5 Mg Tablet  PO  08/05/20 16:15 





  HSP PRN  





  SLEEP OR INSOMNIA  














Discontinued Medications














Generic Name Dose Route Start Last Admin





  Trade Name Freq  PRN Reason Stop Dose Admin


 


Albuterol  2.5 mg  07/27/20 19:19 





  Ventolin 0.083% Neb 2.5 Mg/3 Ml Ampul  NEB  08/26/20 19:18 





  RTQ4HP PRN  





  SHORTNESS OF BREATH  


 


Amlodipine Besylate  5 mg  07/29/20 10:00 





  Norvasc 5 Mg Tablet  PO  08/28/20 09:59 





  DAILY [facility]  


 


Amlodipine Besylate  10 mg  07/29/20 10:00  08/02/20 10:59





  Norvasc 10 Mg Tablet  PO  08/28/20 09:59  Not Given





  DAILY MISAEL  


 


Aspirin  81 mg  07/27/20 22:00  07/27/20 22:16





  Ecotrin 81 Mg Ec Tablet  PO  07/27/20 23:00  81 mg





  QHS MISAEL   Administration


 


Atenolol  100 mg  07/29/20 10:00  07/30/20 09:42





  Tenormin 50 Mg Tablet  PO  08/28/20 09:59  100 mg





  DAILY MISAEL   Administration


 


Atorvastatin Calcium  40 mg  07/27/20 22:00  07/27/20 22:28





  Lipitor 40 Mg Tablet  PO  07/27/20 23:00  40 mg





  QHS MISAEL   Administration


 


Atorvastatin Calcium  20 mg  07/28/20 22:00  07/30/20 22:12





  Lipitor 20 Mg Tablet  PO  08/27/20 21:59  20 mg





  QHS MISAEL   Administration


 


Azithromycin  500 mg  07/27/20 18:01  07/27/20 18:35





  Zithromax 250 Mg Tablet  PO  07/27/20 18:02  500 mg





  NOW ONE   Administration


 


Azithromycin  250 mg  07/28/20 18:00  07/30/20 17:33





  Zithromax 250 Mg Tablet  PO  08/04/20 17:59  250 mg





  QPM MISAEL   Administration


 


Dexamethasone Sodium Phosphate  2 mg  07/27/20 22:00  07/29/20 13:35





  Decadron Inj 4 Mg/Ml Vial  IV  08/26/20 21:59  2 mg





  Q8 MISAEL   Administration


 


Dexamethasone Sodium Phosphate  2 mg  07/29/20 22:00  07/30/20 09:44





  Decadron Inj 4 Mg/Ml Vial  IV  08/28/20 21:59  2 mg





  Q12 MISAEL   Administration


 


Diltiazem HCl  20 mg  07/28/20 11:21  07/28/20 11:44





  Cardizem Inj 25 Mg/5 Ml Vial  IV  07/28/20 11:22  20 mg





  NOW ONE   Administration


 


Diltiazem HCl  20 mg  07/30/20 11:30  07/30/20 12:08





  Cardizem Inj 25 Mg/5 Ml Vial  IV  07/30/20 11:31  Not Given





  NOW ONE  


 


Diltiazem HCl  25 mg  07/31/20 07:00  07/31/20 06:59





  Cardizem Inj 25 Mg/5 Ml Vial  IV  07/31/20 07:01  25 mg





  NOW ONE   Administration


 


Diltiazem HCl  Confirm  07/31/20 06:53  07/31/20 07:54





  Cardizem Inj 25 Mg/5 Ml Vial  Administered  07/31/20 06:54  Not Given





  Dose  





  25 mg  





  .ROUTE  





  .STK-MED ONE  


 


Enoxaparin Sodium  80 mg  07/27/20 22:00  08/01/20 09:13





  Lovenox Inj 80 Mg/0.8 Ml Disp.Syrin  SUBCUT  08/26/20 21:59  80 mg





  Q12 MISAEL   Administration


 


Hydralazine HCl  25 mg  07/29/20 10:00  07/31/20 10:00





  Apresoline 25 Mg Tablet  PO  08/28/20 09:59  Not Given





  Q12 MISAEL  


 


Hydrochlorothiazide  12.5 mg  07/29/20 10:00  08/01/20 09:14





  Hydrodiuril 12.5 Mg Tablet  PO  08/28/20 09:59  12.5 mg





  DAILY MISAEL   Administration


 


Sodium Chloride  1,000 mls @ 0 mls/hr  07/27/20 18:01  07/27/20 19:36





  Nacl 0.9% 1000 Ml Iv Soln  IV  07/27/20 18:02  Infused





  BOLUS ONE   Infusion





  [facility]  


 


Ceftriaxone Sodium/Dextrose  2 gm in 50 mls @ 100 mls/hr  07/27/20 18:01  

07/27/20 19:07





  Rocephin Rtu 2 Gm/D5w 50 Ml Premix Bag  IV  07/27/20 18:30  Infused





  NOW ONE   Infusion


 


Ceftriaxone Sodium/Dextrose  1 gm in 50 mls @ 100 mls/hr  07/28/20 10:00  

07/29/20 10:49





  Rocephin Rtu 1 Gm/D5w 50 Ml Premix  IV  08/04/20 09:59  Infused





  DAILY MISAEL   Infusion


 


Insulin Human Lispro  16 unit  07/28/20 12:30  07/28/20 12:24





  Humalog Insulin 100 Unit/1 Ml 3 Ml Vial  SUBCUT  07/28/20 12:31  16 unit





  NOW ONE   Administration


 


Insulin Human Lispro  Confirm  07/28/20 22:21  07/28/20 22:27





  Humalog Insulin 100 Unit/1 Ml 3 Ml Vial  Administered  07/28/20 22:22  Not 

Given





  Dose  





  1 unit  





  .ROUTE  





  .STK-MED ONE  


 


Insulin Human Lispro  15 unit  07/28/20 22:30  07/28/20 22:38





  Humalog Insulin 100 Unit/1 Ml 3 Ml Vial  SUBCUT  07/28/20 22:31  15 unit





  NOW ONE   Administration


 


Insulin Human Lispro  20 unit  07/29/20 13:30  07/29/20 13:34





  Humalog Insulin 100 Unit/1 Ml 3 Ml Vial  SUBCUT  07/29/20 13:31  20 unit





  NOW ONE   Administration


 


Insulin Human Lispro  20 unit  07/29/20 14:00  07/29/20 13:41





  Humalog Insulin 100 Unit/1 Ml 3 Ml Vial  SUBCUT  07/29/20 14:01  20 unit





  NOW ONE   Administration


 


Insulin Human NPH  20 unit  07/29/20 18:00 





  Humulin N (Nph) Insulin 100 Unit/1 Ml 3 Ml  SUBCUT  08/28/20 17:59 





  QPM [facility]  


 


Insulin Human Regular  0 - 12 unit  07/27/20 22:00  07/28/20 11:37





  Humulin R (Pyxis) Insulin 100 Unit/Ml 3ml  SUBCUT  08/26/20 21:59  Not Given





  ACHS [facility]  





  Protocol  


 


Insulin Human Regular  4 unit  07/28/20 10:30  07/28/20 09:45





  Humulin R (Pyxis) Insulin 100 Unit/Ml 3ml  SUBCUT  07/28/20 10:31  4 unit





  NOW ONE   Administration


 


Levothyroxine Sodium  0.075 mg  07/29/20 10:00 





  Synthroid 0.075 Mg Tablet  PO  08/28/20 09:59 





  MOTUWETHFR@1000 [facility]  


 


Levothyroxine Sodium  0.088 mg  08/01/20 10:00 





  Synthroid 0.088 Mg Tablet  PO  08/31/20 09:59 





  SUSA@1000 [facility]  


 


Lisinopril  10 mg  07/27/20 22:00  07/27/20 22:31





  Prinivil 10 Mg Tablet  PO  07/27/20 23:00  10 mg





  DAILY [facility]   Administration


 


Lisinopril  20 mg  07/29/20 10:00  07/31/20 10:01





  Prinivil 10 Mg Tablet  PO  08/28/20 09:59  Not Given





  DAILY [facility]  


 


Metoprolol Succinate  100 mg  08/02/20 10:00 





  Toprol Xl 50 Mg Tab.Sr  PO  09/01/20 09:59 





  Q12 [facility]  


 


Metoprolol Tartrate  25 mg  07/30/20 12:00  07/31/20 17:03





  Lopressor 25 Mg Tablet  PO  08/29/20 11:59  25 mg





  Q6 [facility]   Administration


 


Metoprolol Tartrate  Confirm  07/31/20 06:13  07/31/20 06:18





  Lopressor Inj/Pf 5 Mg/5 Ml Sdv  Administered  07/31/20 06:14  5 mg





  Dose   Administration





  5 mg  





  IV  





  .STK-MED ONE  


 


Metoprolol Tartrate  5 mg  07/31/20 06:45  07/31/20 07:54





  Lopressor Inj/Pf 5 Mg/5 Ml Sdv  IV  07/31/20 06:46  Not Given





  NOW ONE  


 


Metoprolol Tartrate  50 mg  07/31/20 18:00  08/02/20 06:57





  Lopressor 50 Mg Tablet  PO  08/30/20 17:59  50 mg





  Q6 MISAEL   Administration


 


Prednisone  60 mg  07/31/20 10:00  08/01/20 09:15





  Deltasone 20 Mg Tablet  PO  08/30/20 09:59  60 mg





  DAILY MISAEL   Administration














Assessment & Plan





- Diagnosis


(1) Atrial fibrillation with RVR


Is this a current diagnosis for this admission?: Yes   


Plan: 


Unfortunately she continues to have atrial fibrillation with rapid ventricular 

response reason why she will undergo JENNIFER guided cardioversion later today.  She 

has remained n.p.o. since last night.  The risks, benefits and alternatives to 

JENNIFER guided cardioversion were explained in detail to the patient, her questions 

were answered and she verbalized her desire to proceed.





Recommendations:


-Continue with current medical management for now.


-Continue with  Eliquis 5 mg p.o. twice daily.


-Proceed with JENNIFER guided cardioversion today.


-Further management pending outcome of JENNIFER guided cardioversion.














(2) Coronary artery disease


Qualifiers: 


   Coronary Disease-Associated Artery/Lesion type: native artery   Native vs. 

transplanted heart: native heart   Associated angina: without angina   Qualified

Code(s): I25.10 - Atherosclerotic heart disease of native coronary artery 

without angina pectoris   


Is this a current diagnosis for this admission?: Yes   


Plan: 


The patient underwent stenting of the proximal right coronary artery in May 20

02.  She is currently free of ischemic symptoms however she does have lower 

extremity edema which she attributes to increased doses of Norvasc.  She has 

remained free of ischemic and heart failure symptoms and without evidence of 

heart failure by exam or radiographic assessment.





Recommendations:


-Discontinue hydralazine.


-Uptitrate lisinopril as indicated to keep systolic blood pressure to 130/80 or 

below.


-Strict intake and output.


-Minimize IV fluids.


-Low sodium/low cholesterol diet.


-Increase Lipitor to 80 mg daily.


-We will continue to follow-up with you.








(3) Hypertension


Qualifiers: 


   Hypertension type: essential hypertension   Qualified Code(s): I10 - 

Essential (primary) hypertension   


Is this a current diagnosis for this admission?: Yes   


Plan: 


Her blood pressure is at goal.  I will defer further management to the primary 

team.

## 2020-08-04 VITALS — DIASTOLIC BLOOD PRESSURE: 67 MMHG | SYSTOLIC BLOOD PRESSURE: 140 MMHG

## 2020-08-04 LAB
ANION GAP SERPL CALC-SCNC: 6 MMOL/L (ref 5–19)
BUN SERPL-MCNC: 29 MG/DL (ref 7–20)
CALCIUM: 9 MG/DL (ref 8.4–10.2)
CHLORIDE SERPL-SCNC: 96 MMOL/L (ref 98–107)
CO2 SERPL-SCNC: 28 MMOL/L (ref 22–30)
ERYTHROCYTE [DISTWIDTH] IN BLOOD BY AUTOMATED COUNT: 15.5 % (ref 11.5–14)
GLUCOSE SERPL-MCNC: 209 MG/DL (ref 75–110)
HCT VFR BLD CALC: 27.5 % (ref 36–47)
HGB BLD-MCNC: 9.5 G/DL (ref 12–15.5)
MCH RBC QN AUTO: 28.7 PG (ref 27–33.4)
MCHC RBC AUTO-ENTMCNC: 34.5 G/DL (ref 32–36)
MCV RBC AUTO: 83 FL (ref 80–97)
PLATELET # BLD: 212 10^3/UL (ref 150–450)
POTASSIUM SERPL-SCNC: 4.3 MMOL/L (ref 3.6–5)
RBC # BLD AUTO: 3.31 10^6/UL (ref 3.72–5.28)
WBC # BLD AUTO: 9.7 10^3/UL (ref 4–10.5)

## 2020-08-04 RX ADMIN — INSULIN LISPRO SCH UNIT: 100 INJECTION, SOLUTION INTRAVENOUS; SUBCUTANEOUS at 09:06

## 2020-08-04 RX ADMIN — Medication SCH: at 05:17

## 2020-08-04 RX ADMIN — OXYCODONE HYDROCHLORIDE AND ACETAMINOPHEN SCH MG: 500 TABLET ORAL at 09:04

## 2020-08-04 RX ADMIN — METOPROLOL TARTRATE SCH MG: 100 TABLET, FILM COATED ORAL at 09:11

## 2020-08-04 RX ADMIN — ASPIRIN SCH MG: 81 TABLET, COATED ORAL at 09:03

## 2020-08-04 RX ADMIN — Medication SCH ML: at 13:49

## 2020-08-04 RX ADMIN — Medication SCH MG: at 09:03

## 2020-08-04 RX ADMIN — INSULIN LISPRO SCH UNIT: 100 INJECTION, SOLUTION INTRAVENOUS; SUBCUTANEOUS at 13:47

## 2020-08-04 RX ADMIN — APIXABAN SCH MG: 5 TABLET, FILM COATED ORAL at 09:03

## 2020-08-04 RX ADMIN — LEVOTHYROXINE SODIUM SCH MG: 75 TABLET ORAL at 05:19

## 2020-08-04 RX ADMIN — MULTIVITAMIN TABLET SCH TAB: TABLET at 09:11

## 2020-08-04 RX ADMIN — FOLIC ACID SCH MG: 1 TABLET ORAL at 09:04

## 2020-08-04 RX ADMIN — CITALOPRAM HYDROBROMIDE SCH MG: 20 TABLET ORAL at 09:04

## 2020-08-04 RX ADMIN — PANTOPRAZOLE SODIUM SCH MG: 40 TABLET, DELAYED RELEASE ORAL at 05:18

## 2020-08-04 RX ADMIN — VITAMIN D, TAB 1000IU (100/BT) SCH UNIT: 25 TAB at 09:03

## 2020-08-04 RX ADMIN — LISINOPRIL SCH MG: 10 TABLET ORAL at 09:11

## 2020-08-04 RX ADMIN — INSULIN HUMAN SCH UNIT: 100 INJECTION, SUSPENSION SUBCUTANEOUS at 09:06

## 2020-08-04 NOTE — PDOC PROGRESS REPORT
Subjective


Progress Note for:: 08/04/20


Subjective:: 





NADEEN BARRIOS is a 64 year old female with a history of coronary artery 

disease status post PCI to the proximal RCA with a 4.0 mm x 18 mm Elite stent in

May 2002, palpitations, PACs in the past, COPD, hyperlipidemia, hypertension, 

depression, diabetes mellitus and hyponatremia who was admitted to [facility] on 

07/27/2020 with several weeks of increasing shortness of breath and cough as 

well as fevers and who is consulted to our service for rapid atrial 

fibrillation.  The hospitalist service has been treating her for COVID-19 as she

clinically is having all symptoms and signs of COVID infection despite negative 

testing.  The patient had been responding appropriately to treatment for her 

pulmonary condition however began to have episodes of paroxysmal atrial fibri

llation recently and, as of yesterday, had remained in rapid atrial 

fibrillation.  She was begun on Lopressor 25 p.o. every 6 with some improvement 

in her pulse.  This morning she is found in bed, resting comfortably although 

she complains of shortness of breath and racing heart.  She feels hot to the 

touch.





08/04/2020:


The patient had an eventful night and remains asymptomatic from the cardiovas

cular standpoint.  She underwent JENNIFER guided cardioversion yesterday successfully

and feels much better this morning.  She denies ischemic or heart failure 

symptoms.  Her telemetry shows sinus bradycardia.





Physical exam on 08/04/2020:


GENERAL:  Pleasant and conversational.  Oriented x3 with normal mood.  Not in 

acute distress however she is speaking in short sentences and appears to be 

short of breath.


HEENT:  Normocephalic, atraumatic.  Pupils equal.   Sclerae anicteric.  

Oropharynx moist. 


NECK:  No JVD.  No carotid bruits. 


LUNGS:  Clear to auscultation bilaterally, decreased breath sounds bilaterally. 

Normal respiratory effort without the use of accessory muscles or intercostal 

retractions.  


CARDIOVASCULAR: Regular rate and rhythm, bradycardic, normal S1 and S2 without 

murmurs, rubs, or gallops.  PMI not displaced.


ABDOMEN: No masses or tenderness to palpation.  No bruit.  No splenomegaly or 

hepatomegaly. No abdominal aorta bruit noted.


EXTREMITIES: Trace pitting edema bilaterally, no cyanosis, no clubbing.  +2 

pulses femoral and pedal pulses bilaterally.  


SKIN: No lesions or rashes.


MUSCULOSKELETAL:  No chest tenderness to palpation. 


NEUROLOGIC: Nonfocal.  No gross sensory or motor deficits bilateral upper or 

lower extremities.





Reason For Visit: 


COPD EXACERBATION,CORONARY ARTERY DISEASE,HYPERTEN








Physical Exam


Vital Signs: 


                                        











Temp Pulse Resp BP Pulse Ox


 


 98.2 F   53 L  16   121/62   98 


 


 08/03/20 23:40  08/04/20 02:00  08/03/20 23:40  08/03/20 23:40  08/03/20 23:40








                                 Intake & Output











 08/03/20 08/04/20 08/05/20





 06:59 06:59 06:59


 


Intake Total 1440 1740 


 


Balance 1440 1740 


 


Weight 75.1 kg 75.1 kg 














Results


Laboratory Results: 


                                        





                                 08/04/20 04:31 





                                 08/04/20 04:31 





                                        











  08/04/20 08/04/20





  04:31 04:31


 


WBC  9.7 


 


RBC  3.31 L 


 


Hgb  9.5 L 


 


Hct  27.5 L 


 


MCV  83 


 


MCH  28.7 


 


MCHC  34.5 


 


RDW  15.5 H 


 


Plt Count  212 


 


Sodium   129.8 L


 


Potassium   4.3


 


Chloride   96 L


 


Carbon Dioxide   28


 


Anion Gap   6


 


BUN   29 H


 


Creatinine   0.97


 


Est GFR (African Amer)   > 60


 


Glucose   209 H


 


Calcium   9.0











Impressions: 


                                        





Chest/Abdomen CTA  07/27/20 18:00


IMPRESSION: 


 


Mild emphysema.


 








Chest CT  07/31/20 00:00


IMPRESSION:


 


No acute abnormality within the chest.


 


Subacute fracture involving the left anterior fifth rib.


 


3.0 mm solid pulmonary nodule within the upper lobe. If patient


is low risk for malignancy, no routine follow-up imaging is


recommended; if patient is high risk for malignancy, a


non-contrast Chest CT at 12 months is optional. If performed and


the nodule is stable at 12 months, no further follow-up is


recommended.


These guidelines do not apply to immunocompromised patients and


patients with cancer. Follow up in patients with significant


comorbidities as clinically warranted. For lung cancer screening,


adhere to Lung-RADS guidelines. Reference: Radiology. 2017;


284(1):228-43.


 


Splenic varices with splenorenal shunt.


 


TECHNICAL DOCUMENTATION:


 


Quality ID # [id number]: Final reports with documentation of one or more


dose reduction techniques (e.g., Automated exposure control,


adjustment of the mA and/or kV according to patient size, use of


iterative reconstruction technique)


 


copyright 2011 [employer]- All Rights Reserved


 








Chest X-Ray  07/31/20 00:00


IMPRESSION:  No interval change in the chest.


 








                                        





                                 08/04/20 04:31 





                                 08/04/20 04:31 





                                        











MCV  83 fl (80-97)   08/04/20  04:31    


 


MCH  28.7 pg (27.0-33.4)   08/04/20  04:31    


 


MCHC  34.5 g/dL (32.0-36.0)   08/04/20  04:31    


 


RDW  15.5 % (11.5-14.0)  H  08/04/20  04:31    


 


Seg Neutrophils %  86.0 % (42-78)  H  07/28/20  05:06    


 


Chloride  96 mmol/L ()  L  08/04/20  04:31    


 


Carbon Dioxide  28 mmol/L (22-30)   08/04/20  04:31    


 


Anion Gap  6  (5-19)   08/04/20  04:31    


 


Est GFR ( Amer)  > 60  (>60)   08/04/20  04:31    


 


Est GFR (Non-Af Amer)  Cancelled   07/27/20  15:01    


 


Glucose  209 mg/dL ()  H  08/04/20  04:31    


 


Calcium  9.0 mg/dL (8.4-10.2)   08/04/20  04:31    


 


Phosphorus  3.2 mg/dL (2.5-4.5)   07/28/20  05:06    


 


Magnesium  1.5 mg/dL (1.6-2.3)  L  07/28/20  05:06    


 


Ferritin  581.00 ng/mL (11.1-264.0)  H  07/29/20  05:14    


 


Total Bilirubin  0.6 mg/dL (0.2-1.3)   07/27/20  16:25    


 


AST  84 U/L (14-36)  H  07/27/20  16:25    


 


Alkaline Phosphatase  69 U/L ()   07/27/20  16:25    


 


C-Reactive Protein  132.7 mg/L (<10.0)  H  07/29/20  05:14    


 


Total Protein  7.9 g/dL (6.3-8.2)   07/27/20  16:25    


 


Albumin  4.4 g/dL (3.5-5.0)   07/27/20  16:25    


 


Triglycerides  105 mg/dL (<150)   07/28/20  05:06    


 


Cholesterol  122.99 mg/dL (0-200)   07/28/20  05:06    


 


LDL Cholesterol Direct  46 mg/dL (<100)   07/28/20  05:06    


 


VLDL Cholesterol  21.0 mg/dL (10-31)   07/28/20  05:06    


 


HDL Cholesterol  52 mg/dL (>40)   07/28/20  05:06    


 


TSH  0.13 uIU/mL (0.47-4.68)  L  07/28/20  05:06    


 


Free T4  1.45 ng/dL (0.78-2.19)   07/28/20  05:06    


 


Free T3 pg/mL  1.81 pg/mL (2.77-5.27)  L  07/28/20  05:06    


 


Urine Color  YELLOW   08/01/20  03:30    


 


Urine Appearance  CLEAR   08/01/20  03:30    


 


Urine pH  6.0  (5.0-9.0)   08/01/20  03:30    


 


Ur Specific Gravity  1.038   08/01/20  03:30    


 


Urine Protein  100 mg/dL (NEGATIVE)  H  08/01/20  03:30    


 


Urine Glucose (UA)  50 mg/dL (NEGATIVE)  H  08/01/20  03:30    


 


Urine Ketones  NEGATIVE mg/dL (NEGATIVE)   08/01/20  03:30    


 


Urine Blood  NEGATIVE  (NEGATIVE)   08/01/20  03:30    


 


Urine Nitrite  NEGATIVE  (NEGATIVE)   08/01/20  03:30    


 


Ur Leukocyte Esterase  NEGATIVE  (NEGATIVE)   08/01/20  03:30    


 


Urine WBC (Auto)  1 /HPF  08/01/20  03:30    


 


Urine RBC (Auto)  1 /HPF  08/01/20  03:30    








                             Current Medication List











Generic Name Dose Route Start Last Admin





  Trade Name Freq  PRN Reason Stop Dose Admin


 


Acetaminophen  650 mg  07/27/20 19:19 





  Tylenol 325 Mg Tablet  PO  08/26/20 19:18 





  Q4HP PRN  





  FOR PAIN OR TEMP  


 


Al Hydrox/Mg Hydrox/Simethicone  15 ml  07/27/20 19:19 





  Maalox Plus Susp 30 Udcup  PO  08/26/20 19:18 





  Q6HP PRN  





  HEARTBURN  


 


Albuterol  2 puff  07/27/20 19:48 





  Proair Hfa Inhalation Aerosol 8.5 Gm Mdi  IH  08/26/20 19:47 





  Q4HP PRN  





  WHEEZING  


 


Apixaban  5 mg  08/01/20 18:00  08/03/20 17:06





  Eliquis 5 Mg Tablet  PO  08/31/20 17:59  5 mg





  BID MISAEL   Administration


 


Ascorbic Acid  500 mg  07/28/20 10:00  08/03/20 17:06





  Vitamin C 500 Mg Tablet  PO  08/27/20 09:59  500 mg





  BID MISAEL   Administration


 


Aspirin  81 mg  07/29/20 10:00  08/03/20 09:53





  Ecotrin 81 Mg Ec Tablet  PO  08/28/20 09:59  81 mg





  DAILY MISAEL   Administration


 


Atorvastatin Calcium  80 mg  08/03/20 22:00  08/03/20 21:59





  Lipitor 80 Mg Tablet  PO  09/02/20 21:59  80 mg





  QHS MISAEL   Administration


 


Calcium Carbonate  1 tab  07/28/20 22:00  08/03/20 21:58





  Caltrate 600-Vit D3 400 Tablet  PO  08/27/20 21:59  1 tab





  QHS MISAEL   Administration


 


Cetirizine HCl  10 mg  07/28/20 20:25 





  Zyrtec 10 Mg Tablet  PO  08/27/20 20:24 





  HSP PRN  





  FOR ALLERGIES  


 


Cholecalciferol  2,000 unit  07/28/20 10:00  08/03/20 09:52





  Vitamin D3 1000 Unit Tablet  PO  08/27/20 09:59  2,000 unit





  DAILY MISAEL   Administration


 


Citalopram Hydrobromide  10 mg  07/29/20 10:00  08/03/20 09:53





  Celexa 20 Mg Tablet  PO  08/28/20 09:59  10 mg





  DAILY MISAEL   Administration


 


Dextrose  12.5 gm  07/27/20 19:39  08/03/20 05:38





  Dextrose Inj 50% Syringe (25 Gm/50 Ml)  IV  08/26/20 19:38  12.5 gm





  PRN PRN   Administration





  FOR BG 50-69 IN ALERT PATIENT  





  Protocol  


 


Dextrose  25 gm  07/27/20 19:39 





  Dextrose Inj 50% Syringe (25 Gm/50 Ml)  IV  08/26/20 19:38 





  PRN PRN  





  PER PROTOCOL  





  Protocol  


 


Folic Acid  1 mg  07/29/20 10:00  08/03/20 09:53





  Folvite 1 Mg Tablet  PO  08/28/20 09:59  1 mg





  DAILY MISAEL   Administration


 


Glucagon  1 mg  07/27/20 19:39 





  Glucagen Inj 1 Mg Vial  IM  08/26/20 19:38 





  PRN PRN  





  Evaluate for BG < 70  





  Protocol  


 


Glucose  15 gm  07/27/20 19:39 





  Glutose 40% Gel 15 Gm Tube  PO  08/26/20 19:38 





  PRN PRN  





  FOR BG 50-69 IN ALERT PATIENT  





  Protocol  


 


Glucose  30 gm  07/27/20 19:39 





  Glutose 40% Gel 15 Gm Tube  PO  08/26/20 19:38 





  PRN PRN  





  FOR BG < 50 IN ALERT PATIENT   





  Protocol  


 


Hydroxyzine Pamoate  25 mg  07/29/20 16:16 





  Vistaril 25 Mg Capsule  PO  08/28/20 16:15 





  Q8HP PRN  





  ANXIETY  


 


Insulin Human Lispro  0 - 12 unit  07/28/20 16:00  08/03/20 21:57





  Humalog Insulin 100 Unit/1 Ml 3 Ml Vial  SUBCUT  08/27/20 15:59  12 unit





  ACHS MISAEL   Administration





  Protocol  


 


Insulin Human NPH  25 unit  07/29/20 09:00  08/03/20 07:39





  Humulin N (Nph) Insulin 100 Unit/1 Ml 3 Ml  SUBCUT  08/28/20 08:59  Not Given





  QA MISAEL  


 


Insulin Human NPH  20 unit  07/29/20 22:00  08/03/20 21:58





  Humulin N (Nph) Insulin 100 Unit/1 Ml 3 Ml  SUBCUT  08/28/20 21:59  20 unit





  QHS MISAEL   Administration


 


Levothyroxine Sodium  0.075 mg  07/29/20 08:30  08/04/20 05:19





  Synthroid 0.075 Mg Tablet  PO  08/28/20 08:29  0.075 mg





  MoTuWeThFr@0600 MISAEL   Administration


 


Levothyroxine Sodium  0.088 mg  08/01/20 06:00  08/02/20 07:00





  Synthroid 0.088 Mg Tablet  PO  08/31/20 05:59  0.088 mg





  SuSa@0600 MISAEL   Administration


 


Lisinopril  10 mg  08/01/20 10:00  08/03/20 09:52





  Prinivil 10 Mg Tablet  PO  08/31/20 09:59  10 mg





  DAILY MISAEL   Administration


 


Melatonin  6 mg  07/27/20 22:00  08/03/20 21:58





  Melatonin 3 Mg Tablet  PO  08/26/20 21:59  6 mg





  QHS MISAEL   Administration


 


Metoprolol Tartrate  100 mg  08/02/20 10:00  08/03/20 21:58





  Lopressor 100 Mg Tablet  PO  09/01/20 09:59  100 mg





  Q12 MISAEL   Administration


 


Metoprolol Tartrate  2.5 mg  08/02/20 18:26  08/02/20 18:57





  Lopressor Inj/Pf 5 Mg/5 Ml Sdv  IV  09/01/20 18:25  2.5 mg





  Q6HP PRN   Administration





  Sustained HR >140  


 


Multivitamins  1 tab  07/29/20 10:00  08/03/20 09:53





  Tab-A-Minna (Multiple Vitamin) Tablet  PO  08/28/20 09:59  1 tab





  DAILY MISAEL   Administration


 


Pantoprazole Sodium  40 mg  07/29/20 17:00  08/04/20 05:18





  Protonix 40 Mg Dr Tablet  PO  08/28/20 16:59  40 mg





  BID@0600,1700 MISAEL   Administration


 


Promethazine HCl  12.5 mg  08/03/20 14:30 





  Phenergan Inj 25 Mg/1 Ml Vial  IV  08/26/20 19:18 





  Q4HP PRN  





  FOR NAUSEA/VOMITING  


 


Sodium Chloride  2.5 ml  07/27/20 22:00  08/04/20 05:17





  Saline Flush 2.5 Ml Monoject Prefil Syrin  IV  08/26/20 21:59  Not Given





  Q8 MISAEL  


 


Zinc Sulfate  220 mg  07/28/20 10:00  08/03/20 09:52





  Zinc-220 Capsule  PO  08/27/20 09:59  220 mg





  DAILY MISAEL   Administration


 


Zolpidem Tartrate  5 mg  07/29/20 16:16 





  Ambien 5 Mg Tablet  PO  08/05/20 16:15 





  HSP PRN  





  SLEEP OR INSOMNIA  














Discontinued Medications














Generic Name Dose Route Start Last Admin





  Trade Name Freq  PRN Reason Stop Dose Admin


 


Albuterol  2.5 mg  07/27/20 19:19 





  Ventolin 0.083% Neb 2.5 Mg/3 Ml Ampul  NEB  08/26/20 19:18 





  RTQ4HP PRN  





  SHORTNESS OF BREATH  


 


Amlodipine Besylate  5 mg  07/29/20 10:00 





  Norvasc 5 Mg Tablet  PO  08/28/20 09:59 





  DAILY MISAEL  


 


Amlodipine Besylate  10 mg  07/29/20 10:00  08/02/20 10:59





  Norvasc 10 Mg Tablet  PO  08/28/20 09:59  Not Given





  DAILY MISAEL  


 


Aspirin  81 mg  07/27/20 22:00  07/27/20 22:16





  Ecotrin 81 Mg Ec Tablet  PO  07/27/20 23:00  81 mg





  QHS MISAEL   Administration


 


Atenolol  100 mg  07/29/20 10:00  07/30/20 09:42





  Tenormin 50 Mg Tablet  PO  08/28/20 09:59  100 mg





  DAILY MISAEL   Administration


 


Atorvastatin Calcium  40 mg  07/27/20 22:00  07/27/20 22:28





  Lipitor 40 Mg Tablet  PO  07/27/20 23:00  40 mg





  QHS MISAEL   Administration


 


Atorvastatin Calcium  20 mg  07/28/20 22:00  07/30/20 22:12





  Lipitor 20 Mg Tablet  PO  08/27/20 21:59  20 mg





  QHS MISAEL   Administration


 


Atorvastatin Calcium  80 mg  07/31/20 22:00  08/02/20 23:26





  Lipitor 20 Mg Tablet  PO  08/30/20 21:59  80 mg





  QHS MISAEL   Administration


 


Azithromycin  500 mg  07/27/20 18:01  07/27/20 18:35





  Zithromax 250 Mg Tablet  PO  07/27/20 18:02  500 mg





  NOW ONE   Administration


 


Azithromycin  250 mg  07/28/20 18:00  07/30/20 17:33





  Zithromax 250 Mg Tablet  PO  08/04/20 17:59  250 mg





  QPM MISAEL   Administration


 


Dexamethasone Sodium Phosphate  2 mg  07/27/20 22:00  07/29/20 13:35





  Decadron Inj 4 Mg/Ml Vial  IV  08/26/20 21:59  2 mg





  Q8 MISAEL   Administration


 


Dexamethasone Sodium Phosphate  2 mg  07/29/20 22:00  07/30/20 09:44





  Decadron Inj 4 Mg/Ml Vial  IV  08/28/20 21:59  2 mg





  Q12 MISAEL   Administration


 


Diltiazem HCl  20 mg  07/28/20 11:21  07/28/20 11:44





  Cardizem Inj 25 Mg/5 Ml Vial  IV  07/28/20 11:22  20 mg





  NOW ONE   Administration


 


Diltiazem HCl  20 mg  07/30/20 11:30  07/30/20 12:08





  Cardizem Inj 25 Mg/5 Ml Vial  IV  07/30/20 11:31  Not Given





  NOW ONE  


 


Diltiazem HCl  25 mg  07/31/20 07:00  07/31/20 06:59





  Cardizem Inj 25 Mg/5 Ml Vial  IV  07/31/20 07:01  25 mg





  NOW ONE   Administration


 


Diltiazem HCl  Confirm  07/31/20 06:53  07/31/20 07:54





  Cardizem Inj 25 Mg/5 Ml Vial  Administered  07/31/20 06:54  Not Given





  Dose  





  25 mg  





  .ROUTE  





  .STK-MED ONE  


 


Diltiazem HCl  Confirm  08/03/20 13:50 





  Cardizem Inj 25 Mg/5 Ml Vial  Administered  08/03/20 13:51 





  Dose  





  25 mg  





  .ROUTE  





  .STK-MED ONE  


 


Enoxaparin Sodium  80 mg  07/27/20 22:00  08/01/20 09:13





  Lovenox Inj 80 Mg/0.8 Ml Disp.Syrin  SUBCUT  08/26/20 21:59  80 mg





  Q12 MISAEL   Administration


 


Hydralazine HCl  25 mg  07/29/20 10:00  07/31/20 10:00





  Apresoline 25 Mg Tablet  PO  08/28/20 09:59  Not Given





  Q12 MISAEL  


 


Hydrochlorothiazide  12.5 mg  07/29/20 10:00  08/01/20 09:14





  Hydrodiuril 12.5 Mg Tablet  PO  08/28/20 09:59  12.5 mg





  DAILY MISAEL   Administration


 


Sodium Chloride  1,000 mls @ 0 mls/hr  07/27/20 18:01  07/27/20 19:36





  Nacl 0.9% 1000 Ml Iv Soln  IV  07/27/20 18:02  Infused





  BOLUS ONE   Infusion





  [facility]  


 


Ceftriaxone Sodium/Dextrose  2 gm in 50 mls @ 100 mls/hr  07/27/20 18:01  

07/27/20 19:07





  Rocephin Rtu 2 Gm/D5w 50 Ml Premix Bag  IV  07/27/20 18:30  Infused





  NOW ONE   Infusion


 


Ceftriaxone Sodium/Dextrose  1 gm in 50 mls @ 100 mls/hr  07/28/20 10:00  

07/29/20 10:49





  Rocephin Rtu 1 Gm/D5w 50 Ml Premix  IV  08/04/20 09:59  Infused





  DAILY MISAEL   Infusion


 


Insulin Human Lispro  16 unit  07/28/20 12:30  07/28/20 12:24





  Humalog Insulin 100 Unit/1 Ml 3 Ml Vial  SUBCUT  07/28/20 12:31  16 unit





  NOW ONE   Administration


 


Insulin Human Lispro  Confirm  07/28/20 22:21  07/28/20 22:27





  Humalog Insulin 100 Unit/1 Ml 3 Ml Vial  Administered  07/28/20 22:22  Not 

Given





  Dose  





  1 unit  





  .ROUTE  





  .STK-MED ONE  


 


Insulin Human Lispro  15 unit  07/28/20 22:30  07/28/20 22:38





  Humalog Insulin 100 Unit/1 Ml 3 Ml Vial  SUBCUT  07/28/20 22:31  15 unit





  NOW ONE   Administration


 


Insulin Human Lispro  20 unit  07/29/20 13:30  07/29/20 13:34





  Humalog Insulin 100 Unit/1 Ml 3 Ml Vial  SUBCUT  07/29/20 13:31  20 unit





  NOW ONE   Administration


 


Insulin Human Lispro  20 unit  07/29/20 14:00  07/29/20 13:41





  Humalog Insulin 100 Unit/1 Ml 3 Ml Vial  SUBCUT  07/29/20 14:01  20 unit





  NOW ONE   Administration


 


Insulin Human NPH  20 unit  07/29/20 18:00 





  Humulin N (Nph) Insulin 100 Unit/1 Ml 3 Ml  SUBCUT  08/28/20 17:59 





  QPM [facility]  


 


Insulin Human Regular  0 - 12 unit  07/27/20 22:00  07/28/20 11:37





  Humulin R (Pyxis) Insulin 100 Unit/Ml 3ml  SUBCUT  08/26/20 21:59  Not Given





  [facility]  





  Protocol  


 


Insulin Human Regular  4 unit  07/28/20 10:30  07/28/20 09:45





  Humulin R (Pyxis) Insulin 100 Unit/Ml 3ml  SUBCUT  07/28/20 10:31  4 unit





  NOW ONE   Administration


 


Levothyroxine Sodium  0.075 mg  07/29/20 10:00 





  Synthroid 0.075 Mg Tablet  PO  08/28/20 09:59 





  MOTUWETHFR@1000 [facility]  


 


Levothyroxine Sodium  0.088 mg  08/01/20 10:00 





  Synthroid 0.088 Mg Tablet  PO  08/31/20 09:59 





  SUSA@1000 [facility]  


 


Lidocaine HCl  Confirm  08/03/20 13:34 





  Xylocaine 2% Inj-Pf (100 Mg/5ml) Syringe  Administered  08/03/20 13:35 





  Dose  





  100 mg  





  .ROUTE  





  .STK-MED ONE  


 


Lisinopril  10 mg  07/27/20 22:00  07/27/20 22:31





  Prinivil 10 Mg Tablet  PO  07/27/20 23:00  10 mg





  DAILY [facility]   Administration


 


Lisinopril  20 mg  07/29/20 10:00  07/31/20 10:01





  Prinivil 10 Mg Tablet  PO  08/28/20 09:59  Not Given





  DAILY MISAEL  


 


Metoprolol Succinate  100 mg  08/02/20 10:00 





  Toprol Xl 50 Mg Tab.Sr  PO  09/01/20 09:59 





  Q12 MISAEL  


 


Metoprolol Tartrate  25 mg  07/30/20 12:00  07/31/20 17:03





  Lopressor 25 Mg Tablet  PO  08/29/20 11:59  25 mg





  Q6 MISAEL   Administration


 


Metoprolol Tartrate  Confirm  07/31/20 06:13  07/31/20 06:18





  Lopressor Inj/Pf 5 Mg/5 Ml Sdv  Administered  07/31/20 06:14  5 mg





  Dose   Administration





  5 mg  





  IV  





  .STK-MED ONE  


 


Metoprolol Tartrate  5 mg  07/31/20 06:45  07/31/20 07:54





  Lopressor Inj/Pf 5 Mg/5 Ml Sdv  IV  07/31/20 06:46  Not Given





  NOW ONE  


 


Metoprolol Tartrate  50 mg  07/31/20 18:00  08/02/20 06:57





  Lopressor 50 Mg Tablet  PO  08/30/20 17:59  50 mg





  Q6 MISAEL   Administration


 


Prednisone  60 mg  07/31/20 10:00  08/01/20 09:15





  Deltasone 20 Mg Tablet  PO  08/30/20 09:59  60 mg





  DAILY MISAEL   Administration


 


Prednisone  40 mg  08/02/20 10:00  08/03/20 09:52





  Deltasone 20 Mg Tablet  PO  09/01/20 09:59  40 mg





  DAILY MISAEL   Administration


 


Promethazine HCl  12.5 mg  07/27/20 19:19  07/31/20 05:55





  Phenergan Inj 25 Mg/1 Ml Vial  IV  08/26/20 19:18  12.5 mg





  Q4HP PRN   Administration





  FOR NAUSEA/VOMITING  


 


Propofol  Confirm  08/03/20 13:34 





  Diprivan Inj 200 Mg/20 Ml Vial  Administered  08/03/20 13:35 





  Dose  





  200 mg  





  IV  





  .STK-MED ONE  














Assessment & Plan





- Diagnosis


(1) Atrial fibrillation with RVR


Is this a current diagnosis for this admission?: Yes   


Plan: 


Status post successful JENNIFER guided cardioversion and maintaining sinus rhythm.  

As [id number] she had been in sinus bradycardia and asymptomatic.





Recommendations:


-Given her bradycardia I will decrease her Lopressor to 50 mg p.o. twice daily.


-Continue with  Eliquis 5 mg p.o. twice daily.


-The patient may be discharged from the cardiovascular standpoint.


-I will arrange for outpatient follow-up with my partner, Dr. Milligan.











(2) Coronary artery disease


Qualifiers: 


   Coronary Disease-Associated Artery/Lesion type: native artery   Native vs. 

transplanted heart: native heart   Associated angina: without angina   Qualified

Code(s): I25.10 - Atherosclerotic heart disease of native coronary artery 

without angina pectoris   


Is this a current diagnosis for this admission?: Yes   





(3) Hypertension


Qualifiers: 


   Hypertension type: essential hypertension   Qualified Code(s): I10 - 

Essential (primary) hypertension   


Is this a current diagnosis for this admission?: Yes

## 2020-08-04 NOTE — PDOC DISCHARGE SUMMARY
Impression





- Admit/DC Date/PCP


Admission Date/Primary Care Provider: 


  07/27/20 19:30





  HALLEY HOLT MD





Discharge Date: 08/04/20





- Discharge Diagnosis


(1) Suspected COVID-19 virus infection


Is this a current diagnosis for this admission?: Yes   





(2) Fever


Is this a current diagnosis for this admission?: Yes   





(3) Elevated liver function tests


Is this a current diagnosis for this admission?: Yes   





(4) Elevated d-dimer


Is this a current diagnosis for this admission?: Yes   





(5) COPD (chronic obstructive pulmonary disease) with chronic bronchitis


Is this a current diagnosis for this admission?: Yes   





(6) Coronary artery disease


Is this a current diagnosis for this admission?: Yes   





(7) Hyponatremia


Is this a current diagnosis for this admission?: Yes   





(8) Hypertension


Is this a current diagnosis for this admission?: Yes   





(9) Hypothyroidism, postsurgical


Is this a current diagnosis for this admission?: Yes   





(10) Depression


Is this a current diagnosis for this admission?: Yes   





(11) Diabetes mellitus


Is this a current diagnosis for this admission?: Yes   





- Additional Information


Resuscitation Status: Full Code


Discharge Diet: Cardiac, Diabetic


Discharge Activity: Activity As Tolerated, Balance Activity w/Rest, Slowly 

Increase Activity, No tub bath


Referrals: 


HALLEY HOLT MD [Primary Care Provider] - 08/11/20 2:30 pm


()


ARTI SCHUSTER MD [ACTIVE STAFF] - 08/11/20 8:30 am


(


)


EDWAR LANDA MD [ACTIVE PROVISIONAL STAFF] - 08/17/20 1:00 pm (Follow-up 7 to

10 days)


Prescriptions: 


Prednisone [Deltasone 20 mg Tablet] 60 mg PO DAILY #9 tablet


Apixaban [Eliquis 5 mg Tablet] 5 mg PO BID 15 Days #30 tablet


Albuterol Sulfate [Proair HFA Inhalation Aerosol 8.5 gm MDI] 2 puff IH Q4HP PRN 

#1 hfa.aer.ad


 PRN Reason: 


Pantoprazole Sodium [Protonix 40 mg Dr Tablet] 40 mg PO QAM #30 tablet.dr


Azithromycin [Zithromax 250 mg Tablet] 250 mg PO QPM #2 tablet


Home Medications: 








Amlodipine Besylate [Norvasc 5 mg Tablet] 5 mg PO DAILY 07/27/20 


Atorvastatin Calcium [Lipitor 20 mg Tablet] 20 mg PO QHS 07/27/20 


Citalopram Hydrobromide [Celexa] 10 mg PO DAILY 07/27/20 


Folic Acid [Folvite 1 mg Tablet] 1 mg PO DAILY 07/27/20 


Hydralazine HCl [Apresoline 25 mg Tablet] 25 mg PO BID 07/27/20 


Insulin Lispro [Humalog Kwikpen U-100] 15 unit SQ TID 07/27/20 


Insulin NPH Human Isophane [Novolin N Flexpen] 20 unit SQ QPM 07/27/20 


Insulin NPH Human Isophane [Novolin N Flexpen] 25 unit SQ QAM 07/27/20 


Levothyroxine Sodium 88 mcg PO SUSA@1000 07/27/20 


Albuterol Sulfate [Proair HFA Inhalation Aerosol 8.5 gm MDI] 2 puff IH Q6HP PRN 

07/28/20 


Aspirin [Aspir-Low] 81 mg PO DAILY 07/28/20 


Bisoprolol Fumarate 10 mg PO DAILY 07/28/20 


Calcium Carbonate/Vitamin D3 [Calcium 600-D3 20Mcg(800 Unit)] 1 tab PO QHS 

07/28/20 


Cetirizine HCl [Zyrtec] 10 mg PO HSP PRN 07/28/20 


Levothyroxine Sodium [Levo-T] 75 mcg PO MOTUWETHFR@1000 07/28/20 


Lisinopril/Hydrochlorothiazide [Lisinopril-Hctz 20-12.5 mg Tab] 1 each PO DAILY 

07/28/20 


Multivit with Calcium,Iron,Min [Multiple Vitamins For Women] 1 each PO DAILY 

07/28/20 


Acetaminophen [Tylenol 325 mg Tablet] 650 mg PO Q4HP PRN  tablet 07/30/20 


Albuterol Sulfate [Proair HFA Inhalation Aerosol 8.5 gm MDI] 2 puff IH Q4HP PRN 

#1 hfa.aer.ad 07/30/20 


Azithromycin [Zithromax 250 mg Tablet] 250 mg PO QPM #2 tablet 07/30/20 


Pantoprazole Sodium [Protonix 40 mg Dr Tablet] 40 mg PO QAM #30 tablet.dr 

07/30/20 


Prednisone [Deltasone 20 mg Tablet] 60 mg PO DAILY #9 tablet 07/30/20 


Apixaban [Eliquis 5 mg Tablet] 5 mg PO BID  tablet 08/04/20 


Apixaban [Eliquis 5 mg Tablet] 5 mg PO BID 15 Days #30 tablet 08/04/20 











History of Present Illiness


History of Present Illness: 


NADEEN BARRIOS is a 64 year old female with a history of coronary artery 

disease, COPD, hyperlipidemia, depression, diabetes mellitus and hyponatremia 

presents with several weeks of increasing shortness of breath.  She states that 

she has been having a cough.  Lately it is productive of white sputum.  Until 

today she has been afebrile.  She spiked a fever 103.1 F.  In the emergency 

department her white blood cell count is normal at 5.8 but her differential is 

pending.  Her d-dimer is elevated at 2.84 but her ferritin, LDH and C-reactive 

protein are all pending.  Her serum sodium is 125.  Her glucose is only 72.  

With the increasing shortness of breath and fever as well as increased d-dimer 

the patient has a CT angiogram of the chest pending.  She states that she tested

negative for Covid-19 at a walk-in clinic several days ago.  She did state the 

result was negative however it has such a low sensitivity she has been tested 

again.  Her chest x-ray is fairly unremarkable but this can be part of an 

initial presentation of Covid-19.  She will be admitted to the hospitalist 

service.  Covid-19 serology is pending.  She will be placed on full-strength 

anticoagulation with her d-dimer above 1.0.  LDH, ferritin and C-reactive 

protein are pending.  She will be on a fluid restriction as her sodium is only 

125.








Hospital Course


Hospital Course: 


(1) Atrial fibrillation with RVR


Is this a current diagnosis for this admission?: Yes   


Plan: 


Improved rate control; not at goal.


Atrial fibrillation with RVR.





Cardiology was consulted; appreciate Dr. Haro's assistance


Continue metoprolol 100 mg every 12 hours.


Continue Eliquis.


Planned JENNIFER/Cardioversion by Dr. Ta today








(2) COPD (chronic obstructive pulmonary disease) with chronic bronchitis


Is this a current diagnosis for this admission?: Yes   


Plan: 


Stable and without exacerbation at this time.


The patient reports COPD.  She states that she has not had an exacerbation in a 

long time.  She does have an albuterol rescue inhaler at home but has been not 

on any chronic daily inhaler therapy.  She does not use oxygen at home.








(3) Coronary artery disease


Qualifiers: 


   Coronary Disease-Associated Artery/Lesion type: native artery   Native vs. 

transplanted heart: native heart   Associated angina: without angina   Qualified

Code(s): I25.10 - Atherosclerotic heart disease of native coronary artery 

without angina pectoris   


Is this a current diagnosis for this admission?: Yes   


Plan: 


The patient states that she had a stent placed approximately 10 years ago or 

more.  She was following with cardiology but then decided to change 

practitioners.  





Continue aspirin and statin therapy.


Antihypertensive as above.


Cardiac diet.








(4) Depression


Qualifiers: 


   Depression Type: unspecified   Qualified Code(s): F32.9 - Major depressive 

disorder, single episode, unspecified   


Is this a current diagnosis for this admission?: Yes   


Plan: 


Home dose Celexa.


Vistaril as needed anxiety








(5) Diabetes mellitus


Qualifiers: 


   Diabetes mellitus type: type 1   Diabetes mellitus complication status: 

without complication   Qualified Code(s): E10.9 - Type 1 diabetes mellitus 

without complications   


Is this a current diagnosis for this admission?: Yes   


Plan: 


Improved glucose control off steroids.


A1c 6.4%.


Resume home dose NPH.


Accu-Cheks before meals and at bedtime with Humalog per sliding scale coverage.


Hypoglycemia protocol.


Cardiac/consistent carb diet.








(6) Elevated d-dimer


Is this a current diagnosis for this admission?: Yes   


Plan: 


CTA chest negative for pulmonary embolus.


Improving; d-dimer down to 1.47





Now on Eliquis








(7) Elevated liver function tests


Is this a current diagnosis for this admission?: Yes   


Plan: 


Resolved.


She reports rare to occasional glasses of wine.  No other indication for 

elevated transaminases.  








(8) Fever


Qualifiers: 


   Fever type: unspecified   Qualified Code(s): R50.9 - Fever, unspecified   


Is this a current diagnosis for this admission?: Yes   


Plan: 


Resolved.





Blood cultures (1/4 bottles) shows coag negative staph; contaminant.


Antibiotics as below








(9) Hypertension


Qualifiers: 


   Hypertension type: essential hypertension   Qualified Code(s): I10 - 

Essential (primary) hypertension   


Is this a current diagnosis for this admission?: Yes   


Plan: 


Well-controlled at present.  Currently on metoprolol 100 mg every 12 and 

lisinopril 10 mg daily.


Cardiac diet.








(10) Hyponatremia


Is this a current diagnosis for this admission?: Yes   


Plan: 


Sodium trending up; 125.3-> 122.5-> 125.1-> 132.7





Liberalize dietary sodium.


Fluid restrict 1.2 L/daily


Follow up chemistry.








(11) Hypothyroidism, postsurgical


Is this a current diagnosis for this admission?: Yes   


Plan: 


Continue home dose levothyroxine.








(12) Suspected COVID-19 virus infection


Is this a current diagnosis for this admission?: Yes   


Plan: 


Ruled Out


Rapid and send out COVID-19 tests are negative.


However, she has numerous laboratory results supportive of COVID-19 (elevated d-

dimer, transaminases, CRP, LDH, ferritin) that can not be explained otherwise.


CTA negative for pulmonary embolus and pneumonia.


COPD exacerbation is not suspected (no wheezing on exam) and certainly would not

be the cause of the above-mentioned laboratory changes.


Chest U/S benign


Chest CT w/ negative for acute findings; specifically no ground glass or 

infiltrates noted.





D-dimer improved (1.47)


CRP equivocal (132.7


Ferritin increased (581)


LDH increased (289)





Due to elevated d-dimer, she was placed on full dose Lovenox.  


Received 3 days of Rocephin


Completed 5 day course of Azithromycin.


Transitioned to p.o. prednisone; weaning


Continue vitamin C, vitamin D, zinc, and melatonin supplementation.


Encourage pulmonary toilet.


Contact and droplet precautions.





Patient and family with, understandable, high level of frustration/anxiety 

regarding unclear dx.  Dr. Farley provided bedside eval w/ US.  B lines noted; 

recommended follow up CT Chest to evaluate for ground glass changes.  


As both are negative, effectively r/o COVID.





Physical Exam


Vital Signs: 


                                        











Temp Pulse Resp BP Pulse Ox


 


 98.1 F   55 L  17   135/83 H  100 


 


 08/04/20 08:09  08/04/20 08:09  08/04/20 08:09  08/04/20 08:09  08/04/20 08:09








                                 Intake & Output











 08/03/20 08/04/20 08/05/20





 06:59 06:59 06:59


 


Intake Total 1440 1740 


 


Balance 1440 1740 


 


Weight 75.1 kg 75.1 kg 











General appearance: PRESENT: no acute distress, cooperative, well-developed


Respiratory exam: PRESENT: clear to auscultation steven, symmetrical, unlabored.  

ABSENT: rales, rhonchi, tachypnea, wheezes


Cardiovascular exam: PRESENT: RRR - Borderline bradycardia, +S1, +S2


GI/Abdominal exam: PRESENT: normal bowel sounds, soft.  ABSENT: tenderness


Extremities exam: ABSENT: pedal edema


Neurological exam: PRESENT: alert, awake, oriented to person, oriented to place,

oriented to time, oriented to situation, CN II-XII grossly intact.  ABSENT: 

altered


Psychiatric exam: PRESENT: appropriate affect.  ABSENT: agitated, anxious





Results


Laboratory Results: 


                                        











WBC  9.7 10^3/uL (4.0-10.5)   08/04/20  04:31    


 


RBC  3.31 10^6/uL (3.72-5.28)  L  08/04/20  04:31    


 


Hgb  9.5 g/dL (12.0-15.5)  L  08/04/20  04:31    


 


Hct  27.5 % (36.0-47.0)  L  08/04/20  04:31    


 


MCV  83 fl (80-97)   08/04/20  04:31    


 


MCH  28.7 pg (27.0-33.4)   08/04/20  04:31    


 


MCHC  34.5 g/dL (32.0-36.0)   08/04/20  04:31    


 


RDW  15.5 % (11.5-14.0)  H  08/04/20  04:31    


 


Plt Count  212 10^3/uL (150-450)   08/04/20  04:31    


 


Lymph % (Auto)  8.5 % (13-45)  L  07/28/20  05:06    


 


Mono % (Auto)  4.9 % (3-13)   07/28/20  05:06    


 


Eos % (Auto)  0.2 % (0-6)   07/28/20  05:06    


 


Baso % (Auto)  0.4 % (0-2)   07/28/20  05:06    


 


Absolute Neuts (auto)  3.9 10^3/uL (1.7-8.2)   07/28/20  05:06    


 


Absolute Lymphs (auto)  0.4 10^3/uL (0.5-4.7)  L  07/28/20  05:06    


 


Absolute Monos (auto)  0.2 10^3/uL (0.1-1.4)   07/28/20  05:06    


 


Absolute Eos (auto)  0.0 10^3/uL (0.0-0.6)   07/28/20  05:06    


 


Absolute Basos (auto)  0.0 10^3/uL (0.0-0.2)   07/28/20  05:06    


 


Total Counted  100   07/27/20  16:25    


 


Seg Neutrophils %  86.0 % (42-78)  H  07/28/20  05:06    


 


Seg Neuts % (Manual)  86 % (42-78)  H  07/27/20  16:25    


 


Lymphocytes % (Manual)  9 % (13-45)  L  07/27/20  16:25    


 


Monocytes % (Manual)  5 % (3-13)   07/27/20  16:25    


 


Eosinophils % (Manual)  0 % (0-6)   07/27/20  16:25    


 


Basophils % (Manual)  0 % (0-2)   07/27/20  16:25    


 


Abs Neuts (Manual)  5.0 10^3/uL (1.7-8.2)   07/27/20  16:25    


 


Abs Lymphs (Manual)  0.5 10^3/uL (0.5-4.7)   07/27/20  16:25    


 


Abs Monocytes (Manual)  0.3 10^3/uL (0.1-1.4)   07/27/20  16:25    


 


Absolute Eos (Manual)  0.0 10^3/uL (0.0-0.6)   07/27/20  16:25    


 


Abs Basophils (Manual)  0.0 10^3/uL (0.0-0.2)   07/27/20  16:25    


 


Toxic Vacuolation  PRESENT   07/27/20  16:25    


 


Platelet Estimate  NP   07/27/20  15:01    


 


Platelet Comment  DECREASED   07/27/20  16:25    


 


Anisocytosis  SLIGHT   07/27/20  16:25    


 


Alix Cells  SLIGHT   07/27/20  16:25    


 


D-Dimer  1.47 ug/mL (0.00-0.50)  H  07/29/20  05:14    


 


Sodium  129.8 mmol/L (137-145)  L  08/04/20  04:31    


 


Potassium  4.3 mmol/L (3.6-5.0)   08/04/20  04:31    


 


Chloride  96 mmol/L ()  L  08/04/20  04:31    


 


Carbon Dioxide  28 mmol/L (22-30)   08/04/20  04:31    


 


Anion Gap  6  (5-19)   08/04/20  04:31    


 


BUN  29 mg/dL (7-20)  H  08/04/20  04:31    


 


Creatinine  0.97 mg/dL (0.52-1.25)   08/04/20  04:31    


 


Est GFR ( Amer)  > 60  (>60)   08/04/20  04:31    


 


Est GFR (Non-Af Amer)  Cancelled   07/27/20  15:01    


 


Est GFR (MDRD) Non-Af  57  (>60)  L  08/04/20  04:31    


 


Glucose  209 mg/dL ()  H  08/04/20  04:31    


 


POC Glucose  192 mg/dL ()  H  08/04/20  08:10    


 


Hemoglobin A1c %  6.4 % (4.7-6.0)  H  07/28/20  05:06    


 


Calcium  9.0 mg/dL (8.4-10.2)   08/04/20  04:31    


 


Phosphorus  3.2 mg/dL (2.5-4.5)   07/28/20  05:06    


 


Magnesium  1.5 mg/dL (1.6-2.3)  L  07/28/20  05:06    


 


Ferritin  581.00 ng/mL (11.1-264.0)  H  07/29/20  05:14    


 


Total Bilirubin  0.6 mg/dL (0.2-1.3)   07/27/20  16:25    


 


Direct Bilirubin  0.0 mg/dL (0.0-0.4)   07/27/20  16:25    


 


Neonat Total Bilirubin  Not Reportable   07/27/20  16:25    


 


Neonat Direct Bilirubin  Not Reportable   07/27/20  16:25    


 


Neonat Indirect Bili  Not Reportable   07/27/20  16:25    


 


AST  84 U/L (14-36)  H  07/27/20  16:25    


 


ALT  45 U/L (<35)  H  07/27/20  16:25    


 


Alkaline Phosphatase  69 U/L ()   07/27/20  16:25    


 


Lactate Dehydrogenase  289 U/L (120-246)  H  07/29/20  05:14    


 


C-Reactive Protein  132.7 mg/L (<10.0)  H  07/29/20  05:14    


 


Total Protein  7.9 g/dL (6.3-8.2)   07/27/20  16:25    


 


Albumin  4.4 g/dL (3.5-5.0)   07/27/20  16:25    


 


Triglycerides  105 mg/dL (<150)   07/28/20  05:06    


 


Cholesterol  122.99 mg/dL (0-200)   07/28/20  05:06    


 


LDL Cholesterol Direct  46 mg/dL (<100)   07/28/20  05:06    


 


VLDL Cholesterol  21.0 mg/dL (10-31)   07/28/20  05:06    


 


HDL Cholesterol  52 mg/dL (>40)   07/28/20  05:06    


 


EGFR   Cancelled   07/27/20  15:01    


 


TSH  0.13 uIU/mL (0.47-4.68)  L  07/28/20  05:06    


 


Free T4  1.45 ng/dL (0.78-2.19)   07/28/20  05:06    


 


Free T3 pg/mL  1.81 pg/mL (2.77-5.27)  L  07/28/20  05:06    


 


Urine Color  YELLOW   08/01/20  03:30    


 


Urine Appearance  CLEAR   08/01/20  03:30    


 


Urine pH  6.0  (5.0-9.0)   08/01/20  03:30    


 


Ur Specific Gravity  1.038   08/01/20  03:30    


 


Urine Protein  100 mg/dL (NEGATIVE)  H  08/01/20  03:30    


 


Urine Glucose (UA)  50 mg/dL (NEGATIVE)  H  08/01/20  03:30    


 


Urine Ketones  NEGATIVE mg/dL (NEGATIVE)   08/01/20  03:30    


 


Urine Blood  NEGATIVE  (NEGATIVE)   08/01/20  03:30    


 


Urine Nitrite  NEGATIVE  (NEGATIVE)   08/01/20  03:30    


 


Urine Nitrite (Reflex)  NEGATIVE  (NEGATIVE)   07/28/20  20:55    


 


Urine Bilirubin  NEGATIVE  (NEGATIVE)   08/01/20  03:30    


 


Urine Urobilinogen  NEGATIVE mg/dL (<2.0)   08/01/20  03:30    


 


Ur Leukocyte Esterase  NEGATIVE  (NEGATIVE)   08/01/20  03:30    


 


Leukocyte Esterase Rfl  NEGATIVE  (NEGATIVE)   07/28/20  20:55    


 


Urine WBC (Auto)  1 /HPF  08/01/20  03:30    


 


Urine RBC (Auto)  1 /HPF  08/01/20  03:30    


 


U Hyaline Cast (Auto)  4 /LPF  07/28/20  20:55    


 


Urine WBC (Reflex)  1 /HPF  07/28/20  20:55    


 


Squamous Epi Cells Auto  <1 /HPF  08/01/20  03:30    


 


Urine Mucus (Auto)  RARE /LPF  08/01/20  03:30    


 


Urine Sodium  15 mmol/L (30-90)  L  07/29/20  22:10    


 


Urine Ascorbic Acid  40  (NEGATIVE)  H  08/01/20  03:30    


 


COVID-19 Source  NASOPHARYNGEAL   07/28/20  10:35    


 


COVID-19 ([facility])  NOT DETECTED   07/28/20  10:35    


 


SARS-CoV-2 (PCR)  NEGATIVE  (NEGATIVE)   07/27/20  13:30    


 


Slides for Path Review  NP   07/27/20  15:01    











Impressions: 


                                        





Chest X-Ray  07/27/20 15:20


IMPRESSION:  COPD.  NO ACUTE RADIOGRAPHIC FINDING IN THE CHEST.


 








Chest/Abdomen CTA  07/27/20 18:00


IMPRESSION: 


 


Mild emphysema.


 








Chest CT  07/31/20 00:00


IMPRESSION:


 


No acute abnormality within the chest.


 


Subacute fracture involving the left anterior fifth rib.


 


3.0 mm solid pulmonary nodule within the upper lobe. If patient


is low risk for malignancy, no routine follow-up imaging is


recommended; if patient is high risk for malignancy, a


non-contrast Chest CT at 12 months is optional. If performed and


the nodule is stable at 12 months, no further follow-up is


recommended.


These guidelines do not apply to immunocompromised patients and


patients with cancer. Follow up in patients with significant


comorbidities as clinically warranted. For lung cancer screening,


adhere to Lung-RADS guidelines. Reference: Radiology. 2017;


284(1):228-43.


 


Splenic varices with splenorenal shunt.


 


TECHNICAL DOCUMENTATION:


 


Quality ID # [id number]: Final reports with documentation of one or more


dose reduction techniques (e.g., Automated exposure control,


adjustment of the mA and/or kV according to patient size, use of


iterative reconstruction technique)


 


copyright 2011 [employer]- All Rights Reserved


 








Chest X-Ray  07/31/20 00:00


IMPRESSION:  No interval change in the chest.


 














Plan


Health Concerns: 


Despite negative serology for COVID it is my personal feeling that she met the 

criteria and in fact had Covid-19 viral infection


Plan of Treatment: 


Follow-up with primary care provider and cardiologist


Goals: 


Complete resolution of illnesses


Time Spent: Greater than 30 Minutes





Stroke


Is this a Stroke Patient?: No





Acute Heart Failure





- **


Is this a Heart Failure Patient?: No

## 2020-08-10 ENCOUNTER — HOSPITAL ENCOUNTER (INPATIENT)
Dept: HOSPITAL 62 - ER | Age: 65
LOS: 4 days | Discharge: HOME | DRG: 309 | End: 2020-08-14
Attending: INTERNAL MEDICINE | Admitting: INTERNAL MEDICINE
Payer: COMMERCIAL

## 2020-08-10 DIAGNOSIS — Z82.49: ICD-10-CM

## 2020-08-10 DIAGNOSIS — E11.65: ICD-10-CM

## 2020-08-10 DIAGNOSIS — F32.9: ICD-10-CM

## 2020-08-10 DIAGNOSIS — I25.10: ICD-10-CM

## 2020-08-10 DIAGNOSIS — I48.0: Primary | ICD-10-CM

## 2020-08-10 DIAGNOSIS — J44.9: ICD-10-CM

## 2020-08-10 DIAGNOSIS — E89.0: ICD-10-CM

## 2020-08-10 DIAGNOSIS — I10: ICD-10-CM

## 2020-08-10 DIAGNOSIS — Z79.4: ICD-10-CM

## 2020-08-10 DIAGNOSIS — Z79.82: ICD-10-CM

## 2020-08-10 DIAGNOSIS — E86.0: ICD-10-CM

## 2020-08-10 DIAGNOSIS — Z95.5: ICD-10-CM

## 2020-08-10 DIAGNOSIS — B95.2: ICD-10-CM

## 2020-08-10 DIAGNOSIS — Z90.49: ICD-10-CM

## 2020-08-10 DIAGNOSIS — N39.0: ICD-10-CM

## 2020-08-10 DIAGNOSIS — Z79.899: ICD-10-CM

## 2020-08-10 DIAGNOSIS — Z79.890: ICD-10-CM

## 2020-08-10 DIAGNOSIS — E87.1: ICD-10-CM

## 2020-08-10 DIAGNOSIS — Z83.3: ICD-10-CM

## 2020-08-10 DIAGNOSIS — D64.9: ICD-10-CM

## 2020-08-10 DIAGNOSIS — E78.5: ICD-10-CM

## 2020-08-10 DIAGNOSIS — Z88.2: ICD-10-CM

## 2020-08-10 DIAGNOSIS — R31.9: ICD-10-CM

## 2020-08-10 LAB
ADD MANUAL DIFF: NO
ALBUMIN SERPL-MCNC: 3 G/DL (ref 3.5–5)
ALP SERPL-CCNC: 82 U/L (ref 38–126)
ANION GAP SERPL CALC-SCNC: 7 MMOL/L (ref 5–19)
ANION GAP SERPL CALC-SCNC: 8 MMOL/L (ref 5–19)
APPEARANCE UR: (no result)
APTT PPP: (no result) S
AST SERPL-CCNC: 25 U/L (ref 14–36)
BASE EXCESS BLDV CALC-SCNC: -0.5 MMOL/L
BASOPHILS # BLD AUTO: 0.1 10^3/UL (ref 0–0.2)
BASOPHILS NFR BLD AUTO: 0.7 % (ref 0–2)
BILIRUB DIRECT SERPL-MCNC: 0 MG/DL (ref 0–0.4)
BILIRUB SERPL-MCNC: 1.1 MG/DL (ref 0.2–1.3)
BILIRUB UR QL STRIP: NEGATIVE
BUN SERPL-MCNC: 19 MG/DL (ref 7–20)
BUN SERPL-MCNC: 21 MG/DL (ref 7–20)
CALCIUM: 7.8 MG/DL (ref 8.4–10.2)
CALCIUM: 8.2 MG/DL (ref 8.4–10.2)
CHLORIDE SERPL-SCNC: 88 MMOL/L (ref 98–107)
CHLORIDE SERPL-SCNC: 92 MMOL/L (ref 98–107)
CK SERPL-CCNC: 73 U/L (ref 30–135)
CO2 SERPL-SCNC: 23 MMOL/L (ref 22–30)
CO2 SERPL-SCNC: 26 MMOL/L (ref 22–30)
EOSINOPHIL # BLD AUTO: 0 10^3/UL (ref 0–0.6)
EOSINOPHIL NFR BLD AUTO: 0.5 % (ref 0–6)
ERYTHROCYTE [DISTWIDTH] IN BLOOD BY AUTOMATED COUNT: 15.3 % (ref 11.5–14)
GLUCOSE SERPL-MCNC: 168 MG/DL (ref 75–110)
GLUCOSE SERPL-MCNC: 232 MG/DL (ref 75–110)
GLUCOSE UR STRIP-MCNC: 50 MG/DL
HCO3 BLDV-SCNC: 23.1 MMOL/L (ref 20–32)
HCT VFR BLD CALC: 31.9 % (ref 36–47)
HGB BLD-MCNC: 10.8 G/DL (ref 12–15.5)
KETONES UR STRIP-MCNC: NEGATIVE MG/DL
LYMPHOCYTES # BLD AUTO: 0.6 10^3/UL (ref 0.5–4.7)
LYMPHOCYTES NFR BLD AUTO: 6.2 % (ref 13–45)
MCH RBC QN AUTO: 28 PG (ref 27–33.4)
MCHC RBC AUTO-ENTMCNC: 33.7 G/DL (ref 32–36)
MCV RBC AUTO: 83 FL (ref 80–97)
MONOCYTES # BLD AUTO: 0.3 10^3/UL (ref 0.1–1.4)
MONOCYTES NFR BLD AUTO: 2.9 % (ref 3–13)
NEUTROPHILS # BLD AUTO: 9.3 10^3/UL (ref 1.7–8.2)
NEUTS SEG NFR BLD AUTO: 89.7 % (ref 42–78)
NITRITE UR QL STRIP: NEGATIVE
PCO2 BLDV: 34.6 MMHG (ref 35–63)
PH BLDV: 7.44 [PH] (ref 7.3–7.42)
PH UR STRIP: 5 [PH] (ref 5–9)
PLATELET # BLD: 231 10^3/UL (ref 150–450)
POTASSIUM SERPL-SCNC: 3.5 MMOL/L (ref 3.6–5)
POTASSIUM SERPL-SCNC: 3.5 MMOL/L (ref 3.6–5)
PROT SERPL-MCNC: 5.8 G/DL (ref 6.3–8.2)
PROT UR STRIP-MCNC: >=500 MG/DL
RBC # BLD AUTO: 3.84 10^6/UL (ref 3.72–5.28)
SP GR UR STRIP: 1.02
TOTAL CELLS COUNTED % (AUTO): 100 %
UROBILINOGEN UR-MCNC: 2 MG/DL (ref ?–2)
WBC # BLD AUTO: 10.3 10^3/UL (ref 4–10.5)

## 2020-08-10 PROCEDURE — 84484 ASSAY OF TROPONIN QUANT: CPT

## 2020-08-10 PROCEDURE — 85027 COMPLETE CBC AUTOMATED: CPT

## 2020-08-10 PROCEDURE — 82550 ASSAY OF CK (CPK): CPT

## 2020-08-10 PROCEDURE — 87088 URINE BACTERIA CULTURE: CPT

## 2020-08-10 PROCEDURE — 80053 COMPREHEN METABOLIC PANEL: CPT

## 2020-08-10 PROCEDURE — 71045 X-RAY EXAM CHEST 1 VIEW: CPT

## 2020-08-10 PROCEDURE — 82803 BLOOD GASES ANY COMBINATION: CPT

## 2020-08-10 PROCEDURE — 93010 ELECTROCARDIOGRAM REPORT: CPT

## 2020-08-10 PROCEDURE — 83036 HEMOGLOBIN GLYCOSYLATED A1C: CPT

## 2020-08-10 PROCEDURE — 80069 RENAL FUNCTION PANEL: CPT

## 2020-08-10 PROCEDURE — 87040 BLOOD CULTURE FOR BACTERIA: CPT

## 2020-08-10 PROCEDURE — 99285 EMERGENCY DEPT VISIT HI MDM: CPT

## 2020-08-10 PROCEDURE — 83735 ASSAY OF MAGNESIUM: CPT

## 2020-08-10 PROCEDURE — 85025 COMPLETE CBC W/AUTO DIFF WBC: CPT

## 2020-08-10 PROCEDURE — 87186 SC STD MICRODIL/AGAR DIL: CPT

## 2020-08-10 PROCEDURE — 82962 GLUCOSE BLOOD TEST: CPT

## 2020-08-10 PROCEDURE — 36415 COLL VENOUS BLD VENIPUNCTURE: CPT

## 2020-08-10 PROCEDURE — 93005 ELECTROCARDIOGRAM TRACING: CPT

## 2020-08-10 PROCEDURE — 81001 URINALYSIS AUTO W/SCOPE: CPT

## 2020-08-10 PROCEDURE — 87086 URINE CULTURE/COLONY COUNT: CPT

## 2020-08-10 RX ADMIN — CEFTRIAXONE SCH MLS/HR: 1 INJECTION, SOLUTION INTRAVENOUS at 22:19

## 2020-08-10 RX ADMIN — Medication SCH ML: at 22:33

## 2020-08-10 RX ADMIN — APIXABAN SCH MG: 5 TABLET, FILM COATED ORAL at 19:10

## 2020-08-10 RX ADMIN — SODIUM CHLORIDE AND POTASSIUM CHLORIDE PRN MLS/HR: .9; .15 SOLUTION INTRAVENOUS at 22:14

## 2020-08-10 RX ADMIN — METOPROLOL TARTRATE PRN MG: 5 INJECTION, SOLUTION INTRAVENOUS at 20:32

## 2020-08-10 RX ADMIN — ACETAMINOPHEN PRN MG: 325 TABLET ORAL at 20:15

## 2020-08-10 RX ADMIN — INSULIN LISPRO SCH UNIT: 100 INJECTION, SOLUTION INTRAVENOUS; SUBCUTANEOUS at 22:33

## 2020-08-10 RX ADMIN — ATORVASTATIN CALCIUM SCH MG: 40 TABLET, FILM COATED ORAL at 22:18

## 2020-08-10 NOTE — ER DOCUMENT REPORT
Entered by LETTY STEWART SCRIBE  08/10/20 1228 





Acting as scribe for:LIZET RICHMOND MD





ED Cardiac





- General


Chief Complaint: Irregular Pulse


Stated Complaint: DIZZINESS


Time Seen by Provider: 08/10/20 11:46


Mode of Arrival: Carried


Information source: Patient


Notes: 





This 64-year-old female patient presents to the emergency department today with 

feeling sluggish and tired 2 days ago with a low-grade fever, yesterday she felt

"drained",  and then this morning while in the shower she dropped her shampoo 

and bent over to pick it up and "fell over".  Patient was seen in this emergency

department on 8/3/2020 and was cardioverted for A. fib.  She said she felt fine 

for a few days after being discharged.  Patient is on Eliquis.  Patient mentions

she has had a decreased appetite and decreased p.o. intake.





TRAVEL OUTSIDE OF THE [place] IN LAST 30 DAYS: No





- Related Data


Allergies/Adverse Reactions: 


                                        





Sulfa (Sulfonamide Antibiotics) Allergy (Verified 08/10/20 11:45)


   








Home Medications: dm.  afib.  thyroid.  copd





Past Medical History





- General


Information source: Patient





- Social History


Smoking Status: Never Smoker


Cigarette use (# per day): No


Chew tobacco use (# tins/day): No


Frequency of alcohol use: Rare


Drug Abuse: None


Family History: CAD, DM, Hypertension


Patient has homicidal ideation: No





- Past Medical History


Cardiac Medical History: Reports: Hx Coronary Artery Disease - 1 stent, Hx 

Hypercholesterolemia, Hx Hypertension


Pulmonary Medical History: Reports: Hx COPD


Endocrine Medical History: Reports: Hx Diabetes Mellitus Type 1 - Per the 

patient., Hx Hypothyroidism


Psychiatric Medical History: Reports: Hx Depression


Past Surgical History: Reports: Hx Appendectomy, Hx Cardiac Catheterization, Hx 

Coronary Stent, Hx Tubal Ligation





Review of Systems





- Review of Systems


Constitutional: See HPI, Other - "sluggish and tired"


EENT: No symptoms reported


Cardiovascular: See HPI, Heart racing, Syncope, Lightheaded


Respiratory: No symptoms reported


Gastrointestinal: No symptoms reported


Genitourinary: No symptoms reported


Female Genitourinary: No symptoms reported


Musculoskeletal: No symptoms reported


Skin: No symptoms reported


Hematologic/Lymphatic: No symptoms reported


Neurological/Psychological: No symptoms reported


-: Yes All other systems reviewed and negative





Physical Exam





- Vital signs


Vitals: 


                                        











Temp Pulse Resp BP Pulse Ox


 


 98.7 F   94   16   90/47 L  100 


 


 08/10/20 11:40  08/10/20 11:40  08/10/20 11:40  08/10/20 11:40  08/10/20 11:40











Interpretation: Hypotensive, Tachycardic





- Notes


Notes: 





Physical Exam:


 


General: Alert, appears well. 


 


HEENT: Normocephalic. Atraumatic. PERRL. Extraocular movements intact. 

Oropharynx clear. Dry mucous membranes.


 


Neck: Supple. Non-tender.


 


Respiratory: No respiratory distress. Clear and equal breath sounds bilaterally.


 


Cardiovascular: Tachycardic, irregularly irregular.


 


Abdominal: Normal Inspection. Non-tender. No distension. Normal Bowel Sounds. 


 


Back: No gross abnormalities. 


 


Extremities: Moves all four extremities.


Upper extremities: Normal inspection. Normal ROM.  


Lower extremities: Normal inspection. No edema. Normal ROM.


 


Neurological: Normal cognition. AAOx4. Normal speech.  


 


Psychological: Normal affect. Normal Mood. 


 


Skin: Warm. Dry. Normal color.





Course





- Vital Signs


Vital signs: 


                                        











Temp Pulse Resp BP Pulse Ox


 


 98.7 F   94   16   90/47 L  100 


 


 08/10/20 11:45  08/10/20 11:40  08/10/20 11:40  08/10/20 11:40  08/10/20 11:40














- Laboratory


Result Diagrams: 


                                 08/10/20 13:41





                                 08/10/20 13:15


Laboratory results interpreted by me: 


                                        











  08/10/20 08/10/20 08/10/20





  13:15 13:41 13:41


 


Hgb   10.8 L 


 


Hct   31.9 L 


 


RDW   15.3 H 


 


Lymph % (Auto)   6.2 L 


 


Mono % (Auto)   2.9 L 


 


Absolute Neuts (auto)   9.3 H 


 


Seg Neutrophils %   89.7 H 


 


VBG pH    7.44 H


 


VBG pCO2    34.6 L


 


Sodium  121.2 L  


 


Potassium  3.5 L  


 


Chloride  88 L  


 


BUN  21 H  


 


Creatinine  1.48 H  


 


Est GFR ( Amer)  43 L  


 


Est GFR (MDRD) Non-Af  35 L  


 


Glucose  232 H  


 


Calcium  8.2 L  


 


Magnesium  1.4 L  


 


ALT  39 H  


 


Total Protein  5.8 L  


 


Albumin  3.0 L  


 


Urine Protein   


 


Urine Glucose (UA)   


 


Urine Blood   


 


Urine Urobilinogen   


 


Ur Leukocyte Esterase   














  08/10/20 08/10/20





  14:20 17:36


 


Hgb  


 


Hct  


 


RDW  


 


Lymph % (Auto)  


 


Mono % (Auto)  


 


Absolute Neuts (auto)  


 


Seg Neutrophils %  


 


VBG pH  


 


VBG pCO2  


 


Sodium  


 


Potassium  


 


Chloride  


 


BUN  


 


Creatinine  


 


Est GFR ( Amer)  


 


Est GFR (MDRD) Non-Af  


 


Glucose  


 


Calcium  


 


Magnesium   1.4 L


 


ALT  


 


Total Protein  


 


Albumin  


 


Urine Protein  >=500 H 


 


Urine Glucose (UA)  50 H 


 


Urine Blood  SMALL H 


 


Urine Urobilinogen  2.0 H 


 


Ur Leukocyte Esterase  MODERATE H 














- Diagnostic Test


Radiology reviewed: Image reviewed, Reports reviewed - Chest x-ray shows 

borderline cardiomegaly without heart failure.





- EKG Interpretation by Me


EKG shows normal: Axis, Intervals, QRS Complexes, ST-T Waves


Rate: Tachycardia


Rhythm: A.Fib


Axis/QRS: LBBB


Voltage: Consistent with LVH


P Waves: LAE





Critical Care Note





- Critical Care Note


Total time excluding time spent on procedures (mins): 35


Comments: 





At least 35 minutes spent evaluating the patient initially, reviewing most 

recent admission and cardioversion for her atrial fibrillation.  Reviewing 

current lab work and EKG.  Managing the hypotension, A. fib with RVR, 

hyponatremia and urinary tract infection.  Consultation with admitting 

physician.





Discharge





- Discharge


Clinical Impression: 


 Atrial fibrillation with RVR, Tachycardia, Hyponatremia, Dehydration, 

Hypomagnesemia





Hypotension


Qualifiers:


 Hypotension type: hypotension due to hypovolemia Qualified Code(s): I95.89 - 

Other hypotension





Urinary tract infection


Qualifiers:


 Urinary tract infection type: site unspecified Hematuria presence: with 

hematuria Qualified Code(s): N39.0 - Urinary tract infection, site not specified





Condition: Good


Disposition: ADMITTED AS INPATIENT


Admitting Provider: Millie (Hospitalist)


Unit Admitted: IMCU





I personally performed the services described in the documentation, reviewed and

edited the documentation which was dictated to the scribe in my presence, and it

accurately records my words and actions.

## 2020-08-10 NOTE — ER DOCUMENT REPORT
ED Medical Screen (RME)





- General


Chief Complaint: Dizziness


Stated Complaint: DIZZINESS


Time Seen by Provider: 08/10/20 11:46


Primary Care Provider: 


HALLEY HOLT MD [Primary Care Provider] - Follow up as needed


Mode of Arrival: Carried


Information source: Patient


Notes: 





64-year-old female presented to ED after fainting in the shower.  Patient states

she was in the hospital last week got out on Tuesday diagnosed with A. fib on 

Monday states they got her A. fib back into rhythm discharged on Tuesday she was

going good Wednesday then she started getting fatigue nauseated yesterday she 

became more fatigued and nauseated have a hard time holding her urine this 

morning she fainted in the shower and fell.  She is a type I diabetic and is 

having a hard time holding her urine.


TRAVEL OUTSIDE OF THE [place] IN LAST 30 DAYS: No





- Related Data


Allergies/Adverse Reactions: 


                                        





Sulfa (Sulfonamide Antibiotics) Allergy (Verified 08/10/20 11:45)


   











Past Medical History





- Past Medical History


Cardiac Medical History: Reports: Hx Coronary Artery Disease - 1 stent, Hx 

Hypercholesterolemia, Hx Hypertension


Pulmonary Medical History: Reports: Hx COPD


Endocrine Medical History: Reports: Hx Diabetes Mellitus Type 1 - Per the 

patient., Hx Hypothyroidism


Malignancy Medical History: Denies: Hx Breast Cancer, Hx Colorectal Cancer, Hx 

Liver Cancer, Hx Lung Cancer


GI Medical History: Denies: Hx Cirrhosis, Hx Gastroesophageal Reflux Disease, Hx

Hiatal Hernia


Musculoskeltal Medical History: Denies Hx Fibromyalgia


Skin Medical History: Denies Hx Eczema, Denies Hx Psoriasis


Psychiatric Medical History: Reports: Hx Depression


Past Surgical History: Reports: Hx Appendectomy, Hx Cardiac Catheterization, Hx 

Coronary Stent, Hx Tubal Ligation





Physical Exam





- Vital signs


Vitals: 





                                        











Temp Pulse Resp BP Pulse Ox


 


 98.7 F   94   16   90/47 L  100 


 


 08/10/20 11:40  08/10/20 11:40  08/10/20 11:40  08/10/20 11:40  08/10/20 11:40














Course





- Vital Signs


Vital signs: 





                                        











Temp Pulse Resp BP Pulse Ox


 


 98.7 F   94   16   90/47 L  100 


 


 08/10/20 11:40  08/10/20 11:40  08/10/20 11:40  08/10/20 11:40  08/10/20 11:40














Doctor's Discharge





- Discharge


Referrals: 


HALLEY HOLT MD [Primary Care Provider] - Follow up as needed

## 2020-08-10 NOTE — EKG REPORT
SEVERITY:- ABNORMAL ECG -

A FIB

INCOMPLETE LEFT BUNDLE BRANCH BLOCK

LVH WITH SECONDARY REPOLARIZATION ABNORMALITY

:

Confirmed by: Lizzie Duval 10-Aug-2020 19:14:14

## 2020-08-10 NOTE — PDOC H&P
History of Present Illness


Admission Date/PCP: 


  





  HALLEY HOLT MD





Patient complains of: Fell in shower


History of Present Illness: 


NADEEN BARRIOS is a 64 year old female well-known to me from her recent 

admission.  She has a history of coronary artery disease and was recently 

cardioverted after JENNIFER exam last week.  She states that she felt good when she 

got out of the hospital.  On Sunday she began to feel tired and she felt "off ".

 When she woke up today she states that she still felt quite tired but thought 

if she took a shower she would feel better.  She was in the shower when she 

became lightheaded and "fell".  She states she fell against the door which 

opened thankfully and did not break.  She had several sore areas but no 

significant injuries.  She was going to her primary care provider's office and 

was instructed to go to the hospital.  In the emergency department she was found

to be hypotensive, back in atrial fibrillation with rapid ventricular response 

with a sodium of 121, potassium 3.5, magnesium 1.4 and a glucose of 232.  Her 

urinalysis was markedly positive suggesting infection.  The patient will receive

IV fluids and antibiotics.  We will correct her electrolytes.  Her troponin was 

0.08 and this could be due to the rapid ventricular response however I will 

obtain serial troponin studies.  The patient will be admitted to [facility].








Past Medical History


Cardiac Medical History: Reports: Atrial Fibrillation, Coronary Artery Disease -

1 stent, Hyperlipidema, Hypertension


Pulmonary Medical History: Reports: Chronic Obstructive Pulmonary Disease (COPD)


Endocrine Medical History: Reports: Diabetes Mellitus Type 1 - Per the patient.,

Hypothyroidism


Malignancy Medical History: 


   Denies: Breast Cancer, Colorectal Cancer, Liver Cancer, Lung Cancer


GI Medical History: 


   Denies: Cirrhosis, Gastroesophageal Reflux Disease, Hiatal Hernia


Musculoskeltal Medical History: 


   Denies: Fibromyalgia


Skin Medical History: 


   Denies: Eczema, Psoriasis


Psychiatric Medical History: Reports: Depression


   Denies: Alcohol Dependency, Substance Abuse, Tobacco Dependency


Hematology: Reports: Anemia - Possible myelodysplasia


Infectious Medical History: Reports: None





Past Surgical History


Past Surgical History: Reports: Appendectomy, Cardiac Catheterization, Coronary 

Stent, Tubal Ligation





Social History


Information Source: Patient, [facility] Records


Lives with: Spouse/Significant other


Smoking Status: Never Smoker


Electronic Cigarette use?: No


Frequency of Alcohol Use: Rare


Hx Recreational Drug Use: No


Hx Prescription Drug Abuse: No





- Advance Directive


Resuscitation Status: Full Code


Surrogate healthcare decision maker:: 





Her  Justin





Family History


Family History: CAD, DM, Hypertension


Parental Family History Reviewed: Yes


Children Family History Reviewed: Yes


Sibling(s) Family History Reviewed.: Yes





Medication/Allergy


Home Medications: 








Amlodipine Besylate [Norvasc 5 mg Tablet] 5 mg PO DAILY 07/27/20 


Atorvastatin Calcium [Lipitor 20 mg Tablet] 20 mg PO QHS 07/27/20 


Citalopram Hydrobromide [Celexa] 10 mg PO DAILY 07/27/20 


Folic Acid [Folvite 1 mg Tablet] 1 mg PO DAILY 07/27/20 


Hydralazine HCl [Apresoline 25 mg Tablet] 25 mg PO Q12 07/27/20 


Insulin Lispro [Humalog Kwikpen U-100] 15 unit SQ TID 07/27/20 


Insulin NPH Human Isophane [Novolin N Flexpen] 20 unit SQ QPM 07/27/20 


Insulin NPH Human Isophane [Novolin N Flexpen] 25 unit SQ QAM 07/27/20 


Levothyroxine Sodium 88 mcg PO SUSA@0600 07/27/20 


Aspirin [Aspir-Low] 81 mg PO DAILY 07/28/20 


Bisoprolol Fumarate 10 mg PO DAILY 07/28/20 


Calcium Carbonate/Vitamin D3 [Calcium 600-D3 20Mcg(800 Unit)] 1 tab PO QHS 

07/28/20 


Cetirizine HCl [Zyrtec] 10 mg PO HSP PRN 07/28/20 


Levothyroxine Sodium [Levo-T] 75 mcg PO MOTUWETHFR@1000 07/28/20 


Lisinopril/Hydrochlorothiazide [Lisinopril-Hctz 20-12.5 mg Tab] 1 each PO DAILY 

07/28/20 


Multivit with Calcium,Iron,Min [Multiple Vitamins For Women] 1 each PO DAILY 

07/28/20 


Acetaminophen [Tylenol 325 mg Tablet] 650 mg PO Q4HP PRN  tablet 07/30/20 


Albuterol Sulfate [Proair HFA Inhalation Aerosol 8.5 gm MDI] 2 puff IH Q4HP PRN 

#1 hfa.aer.ad 07/30/20 


Pantoprazole Sodium [Protonix 40 mg Dr Tablet] 40 mg PO QAM #30 tablet.dr 

07/30/20 


Apixaban [Eliquis 5 mg Tablet] 5 mg PO BID  tablet 08/04/20 








Allergies/Adverse Reactions: 


                                        





Sulfa (Sulfonamide Antibiotics) Allergy (Verified 08/10/20 11:45)


   











Review of Systems


All systems: reviewed and no additional remarkable complaints except as stated


Constitutional: PRESENT: other - Dehydrated


Cardiovascular: PRESENT: palpitations


Neurological: PRESENT: other - Lightheaded.





Physical Exam


Vital Signs: 


                                        











Temp Pulse Resp BP Pulse Ox


 


 98.7 F   94   16   90/47 L  100 


 


 08/10/20 11:45  08/10/20 11:40  08/10/20 11:40  08/10/20 11:40  08/10/20 11:40








                                 Intake & Output











 08/09/20 08/10/20 08/11/20





 06:59 06:59 06:59


 


Intake Total   1000


 


Balance   1000


 


Weight   72.575 kg











General appearance: PRESENT: no acute distress, cooperative, well-developed, 

well-nourished


Head exam: PRESENT: atraumatic, normocephalic


Eye exam: PRESENT: conjunctiva pink, EOMI, PERRLA.  ABSENT: scleral icterus


Ear exam: PRESENT: normal external ear exam.  ABSENT: bleeding, drainage


Mouth exam: PRESENT: dry mucosa, tongue midline


Teeth exam: ABSENT: poor dentation


Neck exam: PRESENT: full ROM.  ABSENT: carotid bruit, JVD, lymphadenopathy


Respiratory exam: PRESENT: clear to auscultation steven, symmetrical, unlabored.  

ABSENT: prolonged expiratory phas, rales, rhonchi, tachypnea, wheezes


Cardiovascular exam: PRESENT: irregular rhythm - Irregular rhythm with 

tachycardia, tachycardia.  ABSENT: bradycardia, diastolic murmur, systolic mu

rmur


GI/Abdominal exam: PRESENT: normal bowel sounds, soft.  ABSENT: distended, mass,

tenderness


Rectal exam: PRESENT: deferred


Gentrourinary exam: ABSENT: indwelling catheter


Extremities exam: ABSENT: calf tenderness, joint swelling, pedal edema, +1 edema


Musculoskeletal exam: PRESENT: ambulatory, full ROM.  ABSENT: deformity, 

dislocation, normal inspection


Neurological exam: PRESENT: alert, awake, oriented to person, oriented to place,

oriented to time, oriented to situation, CN II-XII grossly intact.  ABSENT: 

altered, motor sensory deficit


Psychiatric exam: PRESENT: appropriate affect.  ABSENT: agitated, anxious


Focused psych exam: ABSENT: delusional, paranoid, pressured speech, restlessness


Skin exam: PRESENT: dry, normal color, warm.  ABSENT: cyanosis, erythema, rash





Results


Laboratory Results: 


                                        





                                 08/10/20 13:41 





                                 08/10/20 13:15 





                                        











  08/10/20 08/10/20 08/10/20





  13:15 13:41 13:41


 


WBC   10.3 


 


RBC   3.84 


 


Hgb   10.8 L 


 


Hct   31.9 L 


 


MCV   83 


 


MCH   28.0 


 


MCHC   33.7 


 


RDW   15.3 H 


 


Plt Count   231 


 


Seg Neutrophils %   89.7 H 


 


VBG pH    7.44 H


 


VBG pCO2    34.6 L


 


VBG HCO3    23.1


 


VBG Base Excess    -0.5


 


Sodium  121.2 L  


 


Potassium  3.5 L  


 


Chloride  88 L  


 


Carbon Dioxide  26  


 


Anion Gap  7  


 


BUN  21 H  


 


Creatinine  1.48 H  


 


Est GFR ( Amer)  43 L  


 


Glucose  232 H  


 


Calcium  8.2 L  


 


Magnesium  1.4 L  


 


Total Bilirubin  1.1  


 


AST  25  


 


Alkaline Phosphatase  82  


 


Total Protein  5.8 L  


 


Albumin  3.0 L  


 


Urine Color   


 


Urine Appearance   


 


Urine pH   


 


Ur Specific Gravity   


 


Urine Protein   


 


Urine Glucose (UA)   


 


Urine Ketones   


 


Urine Blood   


 


Urine Nitrite   


 


Ur Leukocyte Esterase   


 


Urine WBC (Auto)   


 


Urine RBC (Auto)   














  08/10/20





  14:20


 


WBC 


 


RBC 


 


Hgb 


 


Hct 


 


MCV 


 


MCH 


 


MCHC 


 


RDW 


 


Plt Count 


 


Seg Neutrophils % 


 


VBG pH 


 


VBG pCO2 


 


VBG HCO3 


 


VBG Base Excess 


 


Sodium 


 


Potassium 


 


Chloride 


 


Carbon Dioxide 


 


Anion Gap 


 


BUN 


 


Creatinine 


 


Est GFR (African Amer) 


 


Glucose 


 


Calcium 


 


Magnesium 


 


Total Bilirubin 


 


AST 


 


Alkaline Phosphatase 


 


Total Protein 


 


Albumin 


 


Urine Color  LINDSEY


 


Urine Appearance  CLOUDY


 


Urine pH  5.0


 


Ur Specific Gravity  1.018


 


Urine Protein  >=500 H


 


Urine Glucose (UA)  50 H


 


Urine Ketones  NEGATIVE


 


Urine Blood  SMALL H


 


Urine Nitrite  NEGATIVE


 


Ur Leukocyte Esterase  MODERATE H


 


Urine WBC (Auto)  >182


 


Urine RBC (Auto)  52








                                        











  08/10/20 08/10/20





  13:15 13:41


 


Creatine Kinase  73 


 


Troponin I   0.081











Impressions: 


                                        





Chest X-Ray  08/10/20 12:28


IMPRESSION:  1.  Borderline cardiomegaly.  No evidence for failure.


2.  No acute pulmonary findings.


 














Assessment and Plan





- Diagnosis


(1) Atrial fibrillation with RVR


Is this a current diagnosis for this admission?: Yes   


Plan: 


The patient is normally on bisoprolol 10 mg daily.  I will use small doses of 

metoprolol tartrate initially because of her low blood pressure.  PRN 

intravenous metoprolol is available as well.  She is already on apixaban.








(2) Urinary tract infection


Qualifiers: 


   Urinary tract infection type: site unspecified   Hematuria presence: with 

hematuria   Qualified Code(s): N39.0 - Urinary tract infection, site not 

specified; R31.9 - Hematuria, unspecified   


Is this a current diagnosis for this admission?: Yes   


Plan: 


Urine culture is pending.  The patient will be on intravenous ceftriaxone until 

identification and sensitivities are available.








(3) Dehydration


Is this a current diagnosis for this admission?: Yes   


Plan: 


The patient is likely volume depleted.  She was hypotensive.  The urinary tract 

infection likely also caused increased urine output.  She will receive IV fluids

and we will monitor her renal function and volume status.








(4) Hyponatremia


Is this a current diagnosis for this admission?: Yes   


Plan: 


Sodium levels should begin to correct with normal saline.  We will monitor 

closely.  Once fully hydrated the patient might benefit from chronic fluid 

restriction.








(5) Coronary artery disease


Qualifiers: 


   Coronary Disease-Associated Artery/Lesion type: native artery   Native vs. 

transplanted heart: native heart   Associated angina: without angina   Qualified

Code(s): I25.10 - Atherosclerotic heart disease of native coronary artery 

without angina pectoris   


Is this a current diagnosis for this admission?: Yes   


Plan: 


We will continue her aspirin and statin therapy.  We will add back her 

antihypertensive medications based on her blood pressure.








(6) Hypothyroidism, postsurgical


Is this a current diagnosis for this admission?: Yes   


Plan: 


Resume levothyroxine.  75 mcg Monday through Friday and 88 mcg on Saturday and 

Sunday








(7) Hypertension


Qualifiers: 


   Hypertension type: essential hypertension   Qualified Code(s): I10 - 

Essential (primary) hypertension   


Is this a current diagnosis for this admission?: Yes   


Plan: 


Usually on multiple medications.  Blood pressure was low on admission.  We will 

start with low-dose metoprolol.  As blood pressure improves gradually add back 

antihypertensive medications.








(8) Hypomagnesemia


Is this a current diagnosis for this admission?: Yes   


Plan: 


Supplement magnesium and monitor








(9) Hyperglycemia due to diabetes mellitus


Is this a current diagnosis for this admission?: Yes   


Plan: 


Controlled carbohydrate diet.  Accu-Cheks and sliding scale.  Continue home NPH 

regimen of 25 units in the morning and 20 units in the evening.








(10) COPD (chronic obstructive pulmonary disease) with chronic bronchitis


Is this a current diagnosis for this admission?: Yes   


Plan: 


Asymptomatic at this time.  Only uses rescue inhaler at home.








(11) Depression


Qualifiers: 


   Depression Type: unspecified   Qualified Code(s): F32.9 - Major depressive 

disorder, single episode, unspecified   


Is this a current diagnosis for this admission?: Yes   


Plan: 


Continue Celexa








(12) Hypotension


Qualifiers: 


   Hypotension type: hypotension due to hypovolemia   Qualified Code(s): I95.89 

- Other hypotension; E86.1 - Hypovolemia   


Is this a current diagnosis for this admission?: Yes   


Plan: 


Resolved with IV fluids.  Gradually resume antihypertensive medications as blood

pressure increases.








- Time


Time Spent with patient: 35 or more minutes


Medications reviewed and adjusted accordingly: Yes


Anticipated Discharge Disposition: Home, Self Care


Anticipated Discharge Timeframe: Unknown





- Inpatient Certification


Based on my medical assessment, after consideration of the patient's 

comorbidities, presenting symptoms, or acuity I expect that the services needed 

warrant INPATIENT care.: Yes


I certify that my determination is in accordance with my understanding of 

Medicare's requirements for reasonable and necessary INPATIENT services [42 CFR 

412.3e].: Yes


Medical Necessity: Need Close Monitoring Due to Risk of Patient Decompensation, 

Need For IV Fluids, Need For Continuous Telemetry Monitoring, Need for IV 

Antibiotics


Post Hospital Care: D/C Planner Documentation

## 2020-08-10 NOTE — RADIOLOGY REPORT (SQ)
EXAM DESCRIPTION:  CHEST SINGLE VIEW



IMAGES COMPLETED DATE/TIME:  8/10/2020 12:47 pm



REASON FOR STUDY:  A. fib with RVR and hypotension



COMPARISON:  7/31/2020



EXAM PARAMETERS:  NUMBER OF VIEWS: One view.

TECHNIQUE: Single frontal radiographic view of the chest acquired.

RADIATION DOSE: NA

LIMITATIONS: None.



FINDINGS:  LUNGS AND PLEURA: No opacities, masses or pneumothorax. No pleural effusion.

MEDIASTINUM AND HILAR STRUCTURES: No masses.  Contour normal.

HEART AND VASCULAR STRUCTURES:  Borderline cardiomegaly. Normal vasculature.

BONES: No acute findings.

HARDWARE: None in the chest.

OTHER: No other significant finding.



IMPRESSION:  1.  Borderline cardiomegaly.  No evidence for failure.

2.  No acute pulmonary findings.



TECHNICAL DOCUMENTATION:  JOB ID:  [id number]

 2011 [employer]- All Rights Reserved



Reading location - IP/workstation name: [email or web address]

## 2020-08-11 LAB
ADD MANUAL DIFF: NO
ALBUMIN SERPL-MCNC: 2.8 G/DL (ref 3.5–5)
ANION GAP SERPL CALC-SCNC: 6 MMOL/L (ref 5–19)
BASOPHILS # BLD AUTO: 0 10^3/UL (ref 0–0.2)
BASOPHILS NFR BLD AUTO: 0.5 % (ref 0–2)
BUN SERPL-MCNC: 17 MG/DL (ref 7–20)
CALCIUM: 7.8 MG/DL (ref 8.4–10.2)
CHLORIDE SERPL-SCNC: 97 MMOL/L (ref 98–107)
CO2 SERPL-SCNC: 25 MMOL/L (ref 22–30)
EOSINOPHIL # BLD AUTO: 0.4 10^3/UL (ref 0–0.6)
EOSINOPHIL NFR BLD AUTO: 5.2 % (ref 0–6)
ERYTHROCYTE [DISTWIDTH] IN BLOOD BY AUTOMATED COUNT: 15.3 % (ref 11.5–14)
GLUCOSE SERPL-MCNC: 82 MG/DL (ref 75–110)
HCT VFR BLD CALC: 29.2 % (ref 36–47)
HGB BLD-MCNC: 9.9 G/DL (ref 12–15.5)
LYMPHOCYTES # BLD AUTO: 0.6 10^3/UL (ref 0.5–4.7)
LYMPHOCYTES NFR BLD AUTO: 8.4 % (ref 13–45)
MCH RBC QN AUTO: 28.2 PG (ref 27–33.4)
MCHC RBC AUTO-ENTMCNC: 33.9 G/DL (ref 32–36)
MCV RBC AUTO: 83 FL (ref 80–97)
MONOCYTES # BLD AUTO: 0.3 10^3/UL (ref 0.1–1.4)
MONOCYTES NFR BLD AUTO: 3.9 % (ref 3–13)
NEUTROPHILS # BLD AUTO: 5.6 10^3/UL (ref 1.7–8.2)
NEUTS SEG NFR BLD AUTO: 82 % (ref 42–78)
PHOSPHATE SERPL-MCNC: 2.9 MG/DL (ref 2.5–4.5)
PLATELET # BLD: 204 10^3/UL (ref 150–450)
POTASSIUM SERPL-SCNC: 3.8 MMOL/L (ref 3.6–5)
RBC # BLD AUTO: 3.51 10^6/UL (ref 3.72–5.28)
TOTAL CELLS COUNTED % (AUTO): 100 %
WBC # BLD AUTO: 6.9 10^3/UL (ref 4–10.5)

## 2020-08-11 RX ADMIN — METOPROLOL TARTRATE PRN MG: 5 INJECTION, SOLUTION INTRAVENOUS at 02:58

## 2020-08-11 RX ADMIN — INSULIN LISPRO SCH: 100 INJECTION, SOLUTION INTRAVENOUS; SUBCUTANEOUS at 08:46

## 2020-08-11 RX ADMIN — PANTOPRAZOLE SODIUM SCH MG: 40 TABLET, DELAYED RELEASE ORAL at 06:04

## 2020-08-11 RX ADMIN — INSULIN LISPRO SCH UNIT: 100 INJECTION, SOLUTION INTRAVENOUS; SUBCUTANEOUS at 12:07

## 2020-08-11 RX ADMIN — Medication SCH ML: at 06:05

## 2020-08-11 RX ADMIN — APIXABAN SCH MG: 5 TABLET, FILM COATED ORAL at 10:07

## 2020-08-11 RX ADMIN — CITALOPRAM HYDROBROMIDE SCH MG: 20 TABLET ORAL at 10:06

## 2020-08-11 RX ADMIN — APIXABAN SCH MG: 5 TABLET, FILM COATED ORAL at 18:49

## 2020-08-11 RX ADMIN — INSULIN LISPRO SCH UNIT: 100 INJECTION, SOLUTION INTRAVENOUS; SUBCUTANEOUS at 21:10

## 2020-08-11 RX ADMIN — SODIUM CHLORIDE AND POTASSIUM CHLORIDE PRN MLS/HR: .9; .15 SOLUTION INTRAVENOUS at 10:15

## 2020-08-11 RX ADMIN — INSULIN HUMAN SCH UNIT: 100 INJECTION, SUSPENSION SUBCUTANEOUS at 21:11

## 2020-08-11 RX ADMIN — CETIRIZINE HYDROCHLORIDE SCH MG: 10 TABLET, FILM COATED ORAL at 10:06

## 2020-08-11 RX ADMIN — ACETAMINOPHEN PRN MG: 325 TABLET ORAL at 10:12

## 2020-08-11 RX ADMIN — CEFTRIAXONE SCH MLS/HR: 1 INJECTION, SOLUTION INTRAVENOUS at 21:10

## 2020-08-11 RX ADMIN — INSULIN LISPRO SCH UNIT: 100 INJECTION, SOLUTION INTRAVENOUS; SUBCUTANEOUS at 18:49

## 2020-08-11 RX ADMIN — Medication SCH TAB: at 10:06

## 2020-08-11 RX ADMIN — LEVOTHYROXINE SODIUM SCH MG: 75 TABLET ORAL at 06:13

## 2020-08-11 RX ADMIN — ATORVASTATIN CALCIUM SCH MG: 40 TABLET, FILM COATED ORAL at 21:09

## 2020-08-11 RX ADMIN — Medication SCH: at 13:44

## 2020-08-11 RX ADMIN — METOPROLOL TARTRATE SCH: 25 TABLET, FILM COATED ORAL at 00:36

## 2020-08-11 RX ADMIN — METOPROLOL TARTRATE SCH MG: 25 TABLET, FILM COATED ORAL at 21:09

## 2020-08-11 RX ADMIN — Medication SCH ML: at 21:10

## 2020-08-11 RX ADMIN — METOPROLOL TARTRATE SCH MG: 25 TABLET, FILM COATED ORAL at 10:07

## 2020-08-11 RX ADMIN — FOLIC ACID SCH MG: 1 TABLET ORAL at 10:06

## 2020-08-11 NOTE — EKG REPORT
SEVERITY:- ABNORMAL ECG -

ATRIAL FIBRILLATION, V-RATE 

CONSIDER ANTEROSEPTAL INFARCT

BORDERLINE T ABNORMALITIES, LATERAL LEADS

:

Confirmed by: Lizzie Duval 11-Aug-2020 07:58:04

## 2020-08-11 NOTE — PDOC PROGRESS REPORT
Subjective


Progress Note for:: 08/11/20


Subjective:: 





64 year old female well-known to me from her recent admission.  She has a 

history of coronary artery disease and was recently cardioverted after JENNIFER exam 

last week.  She states that she felt good when she got out of the hospital.  On 

Sunday she began to feel tired and she felt "off ".  When she woke up today she 

states that she still felt quite tired but thought if she took a shower she 

would feel better.  She was in the shower when she became lightheaded and "fe

ll".  She states she fell against the door which opened thankfully and did not 

break.  She had several sore areas but no significant injuries.  She was going 

to her primary care provider's office and was instructed to go to the hospital. 

In the emergency department she was found to be hypotensive, back in atrial 

fibrillation with rapid ventricular response with a sodium of 121, potassium 

3.5, magnesium 1.4 and a glucose of 232.  Her urinalysis was markedly positive 

suggesting infection.  The patient will receive IV fluids and antibiotics.  We 

will correct her electrolytes.  Her troponin was 0.08 and this could be due to 

the rapid ventricular response however I will obtain serial troponin studies.  

The patient will be admitted to [facility].








8/11/20-patient is comfortable in the bed communicating well.  Still 

tachycardic.  Serum sodium came up to 127.7.  Patient is receiving IV Rocephin 

for UTI.  Discussed with Dr. Rubio is going to come and see the patient.


Reason For Visit: 


ATRIAL FIBRILLATION WITH RAPID VENTRICULAR








Physical Exam


Vital Signs: 


                                        











Temp Pulse Resp BP Pulse Ox


 


 98.5 F   91   22 H  115/75   99 


 


 08/11/20 07:13  08/11/20 07:13  08/11/20 07:13  08/11/20 07:13  08/11/20 07:13








                                 Intake & Output











 08/10/20 08/11/20 08/12/20





 06:59 06:59 06:59


 


Intake Total  3250 


 


Output Total  1750 


 


Balance  1500 


 


Weight  73.1 kg 











General appearance: PRESENT: no acute distress, obese


Head exam: PRESENT: atraumatic


Eye exam: PRESENT: PERRLA


Mouth exam: PRESENT: moist, tongue midline


Teeth exam: PRESENT: poor dentation


Neck exam: ABSENT: carotid bruit, JVD, lymphadenopathy, thyromegaly


Respiratory exam: PRESENT: decreased breath sounds


Cardiovascular exam: PRESENT: irregular rhythm, tachycardia


GI/Abdominal exam: PRESENT: normal bowel sounds, soft.  ABSENT: distended, 

guarding, mass, organolmegaly, rebound, tenderness


Rectal exam: PRESENT: deferred


Extremities exam: PRESENT: full ROM.  ABSENT: calf tenderness, clubbing, pedal 

edema


Neurological exam: PRESENT: alert, awake, oriented to person, oriented to place,

oriented to time, oriented to situation, CN II-XII grossly intact.  ABSENT: 

motor sensory deficit


Psychiatric exam: PRESENT: appropriate affect, normal mood.  ABSENT: homicidal 

ideation, suicidal ideation





Results


Laboratory Results: 


                                        





                                 08/11/20 05:25 





                                 08/11/20 05:25 





                                        











  08/10/20 08/10/20 08/10/20





  13:15 13:41 13:41


 


WBC   10.3 


 


RBC   3.84 


 


Hgb   10.8 L 


 


Hct   31.9 L 


 


MCV   83 


 


MCH   28.0 


 


MCHC   33.7 


 


RDW   15.3 H 


 


Plt Count   231 


 


Seg Neutrophils %   89.7 H 


 


VBG pH    7.44 H


 


VBG pCO2    34.6 L


 


VBG HCO3    23.1


 


VBG Base Excess    -0.5


 


Sodium  121.2 L  


 


Potassium  3.5 L  


 


Chloride  88 L  


 


Carbon Dioxide  26  


 


Anion Gap  7  


 


BUN  21 H  


 


Creatinine  1.48 H  


 


Est GFR ( Amer)  43 L  


 


Glucose  232 H  


 


Calcium  8.2 L  


 


Phosphorus   


 


Magnesium  1.4 L  


 


Total Bilirubin  1.1  


 


AST  25  


 


Alkaline Phosphatase  82  


 


Total Protein  5.8 L  


 


Albumin  3.0 L  


 


Urine Color   


 


Urine Appearance   


 


Urine pH   


 


Ur Specific Gravity   


 


Urine Protein   


 


Urine Glucose (UA)   


 


Urine Ketones   


 


Urine Blood   


 


Urine Nitrite   


 


Ur Leukocyte Esterase   


 


Urine WBC (Auto)   


 


Urine RBC (Auto)   














  08/10/20 08/10/20 08/10/20





  14:20 17:36 17:36


 


WBC   


 


RBC   


 


Hgb   


 


Hct   


 


MCV   


 


MCH   


 


MCHC   


 


RDW   


 


Plt Count   


 


Seg Neutrophils %   


 


VBG pH   


 


VBG pCO2   


 


VBG HCO3   


 


VBG Base Excess   


 


Sodium    122.8 L


 


Potassium    3.5 L


 


Chloride    92 L


 


Carbon Dioxide    23


 


Anion Gap    8


 


BUN    19


 


Creatinine    1.17


 


Est GFR ( Amer)    56 L


 


Glucose    168 H


 


Calcium    7.8 L


 


Phosphorus   


 


Magnesium   1.4 L 


 


Total Bilirubin   


 


AST   


 


Alkaline Phosphatase   


 


Total Protein   


 


Albumin   


 


Urine Color  LINDSEY  


 


Urine Appearance  CLOUDY  


 


Urine pH  5.0  


 


Ur Specific Gravity  1.018  


 


Urine Protein  >=500 H  


 


Urine Glucose (UA)  50 H  


 


Urine Ketones  NEGATIVE  


 


Urine Blood  SMALL H  


 


Urine Nitrite  NEGATIVE  


 


Ur Leukocyte Esterase  MODERATE H  


 


Urine WBC (Auto)  >182  


 


Urine RBC (Auto)  52  














  08/11/20 08/11/20





  05:25 05:25


 


WBC  6.9 


 


RBC  3.51 L 


 


Hgb  9.9 L 


 


Hct  29.2 L 


 


MCV  83 


 


MCH  28.2 


 


MCHC  33.9 


 


RDW  15.3 H 


 


Plt Count  204 


 


Seg Neutrophils %  82.0 H 


 


VBG pH  


 


VBG pCO2  


 


VBG HCO3  


 


VBG Base Excess  


 


Sodium   127.7 L


 


Potassium   3.8


 


Chloride   97 L


 


Carbon Dioxide   25


 


Anion Gap   6


 


BUN   17


 


Creatinine   0.91


 


Est GFR (African Amer)   > 60


 


Glucose   82


 


Calcium   7.8 L


 


Phosphorus   2.9


 


Magnesium   2.0


 


Total Bilirubin  


 


AST  


 


Alkaline Phosphatase  


 


Total Protein  


 


Albumin   2.8 L


 


Urine Color  


 


Urine Appearance  


 


Urine pH  


 


Ur Specific Gravity  


 


Urine Protein  


 


Urine Glucose (UA)  


 


Urine Ketones  


 


Urine Blood  


 


Urine Nitrite  


 


Ur Leukocyte Esterase  


 


Urine WBC (Auto)  


 


Urine RBC (Auto)  








                                        











  08/10/20 08/10/20 08/10/20





  13:15 13:41 17:36


 


Creatine Kinase  73  


 


Troponin I   0.081  0.081














  08/10/20 08/11/20





  23:25 05:25


 


Creatine Kinase  


 


Troponin I  0.064  0.060











Impressions: 


                                        





Chest X-Ray  08/10/20 12:28


IMPRESSION:  1.  Borderline cardiomegaly.  No evidence for failure.


2.  No acute pulmonary findings.


 














Assessment and Plan





- Diagnosis


(1) Atrial fibrillation with RVR


Is this a current diagnosis for this admission?: Yes   


Plan: 


The patient is normally on bisoprolol 10 mg daily.  I will use small doses of 

metoprolol tartrate initially because of her low blood pressure.  PRN 

intravenous metoprolol is available as well.  She is already on apixaban.





8/11/2020-patient is on apixaban came in with A. fib with RVR patient recently 

had a cardioversion/JENNIFER Dr. Adams is going to evaluate the patient for further

management.  Presently patient is on metoprolol 25 mg p.o. every 12 hours and 

metoprolol 2.5 mg IV every 6 hours as needed.








(2) Urinary tract infection


Qualifiers: 


   Urinary tract infection type: site unspecified   Hematuria presence: with 

hematuria   Qualified Code(s): N39.0 - Urinary tract infection, site not 

specified; R31.9 - Hematuria, unspecified   


Is this a current diagnosis for this admission?: Yes   


Plan: 


Urine culture is pending.  The patient will be on intravenous ceftriaxone until 

identification and sensitivities are available.





8/11/20-urine culture came back positive for gram-positive cocci in chains.  

Plan is to continue IV ceftriaxone at this time.








(3) Hyponatremia


Is this a current diagnosis for this admission?: Yes   


Plan: 


Sodium levels should begin to correct with normal saline.  We will monitor 

closely.  Once fully hydrated the patient might benefit from chronic fluid 

restriction.





8/11/20-serum sodium came up to 127.7.  Patient is on normal saline at 125 cc/h 

with potassium supplementations plan is to decrease the rate to 75 cc/h.








(4) Coronary artery disease


Qualifiers: 


   Coronary Disease-Associated Artery/Lesion type: native artery   Native vs. 

transplanted heart: native heart   Associated angina: without angina   Qualified

Code(s): I25.10 - Atherosclerotic heart disease of native coronary artery 

without angina pectoris   


Is this a current diagnosis for this admission?: Yes   


Plan: 


We will continue her aspirin and statin therapy.  We will add back her 

antihypertensive medications based on her blood pressure.








(5) Hypertension


Qualifiers: 


   Hypertension type: essential hypertension   Qualified Code(s): I10 - 

Essential (primary) hypertension   


Is this a current diagnosis for this admission?: No   


Plan: 


Usually on multiple medications.  Blood pressure was low on admission.  We will 

start with low-dose metoprolol.  As blood pressure improves gradually add back 

antihypertensive medications.





8/11/2020-patient blood pressure today is 115/75.  Stable.  Plan is to continue 

metoprolol 25 mg p.o. twice daily at this time.








(6) Hyperglycemia due to diabetes mellitus


Is this a current diagnosis for this admission?: Yes   


Plan: 


Controlled carbohydrate diet.  Accu-Cheks and sliding scale.  Continue home NPH 

regimen of 25 units in the morning and 20 units in the evening.





8/11/2020-patient blood sugar is 82 this morning.  Plan is to decrease pH to 15 

units before meals and 15 units nightly.  Check for hemoglobin A1c.  Diet 

exercise complicated medications discussed with the patient.








(7) Hypomagnesemia


Is this a current diagnosis for this admission?: Yes   


Plan: 


Supplement magnesium and monitor





8/11/2020-serum magnesium today is 2.0.  Stable.








(8) COPD (chronic obstructive pulmonary disease) with chronic bronchitis


Is this a current diagnosis for this admission?: Yes   


Plan: 


Asymptomatic at this time.  Only uses rescue inhaler at home.








(9) Hypotension


Qualifiers: 


   Hypotension type: hypotension due to hypovolemia   Qualified Code(s): I95.89 

- Other hypotension; E86.1 - Hypovolemia   


Is this a current diagnosis for this admission?: Yes   


Plan: 


Resolved with IV fluids.  Gradually resume antihypertensive medications as blood

pressure increases.





8/11/2020-blood pressure today is 115/75.  Stable.  Plan is to decrease the IV 

fluids to 75 cc/h.








(10) Depression


Qualifiers: 


   Depression Type: unspecified   Qualified Code(s): F32.9 - Major depressive 

disorder, single episode, unspecified   


Is this a current diagnosis for this admission?: No   


Plan: 


Continue Celexa








- Time


Anticipated Discharge Disposition: Home, Self Care


Anticipated Discharge Timeframe: within 72 hours

## 2020-08-11 NOTE — PDOC CONSULTATION
Consultation


Consult Date: 20


Attending physician:: MATHIEU SPENCER


Provider Consulted: ZOE [place]


Consult reason:: Atrial fibrillation





History of Present Illness


Admission Date/PCP: 


  08/10/20 17:41





  HALLEY HOLT MD





Patient complains of: Palpitations, weakness


History of Present Illness: 


NADEEN BARRIOS is a 64 year old female


With the following active problems


1.  Atrial fibrillation


2.  Long-term systemic anticoagulation


3.  Coronary artery disease


4.  Systemic hypertension


5.  Dyslipidemia





Patient known to me from last week when she presented with new onset atrial 

fibrillation with rapid ventricular response that was difficult to rate control.

 We performed a transesophageal echocardiogram which showed preserved left 

ventricular ejection fraction and no left atrial appendage thrombus.  We 

proceeded with direct-current cardioversion which was successful in restoring 

sinus rhythm.  Patient subsequently went home and did not feel like her usual 

self and in the shower felt dizzy lightheaded and fell over.  Is not very clear 

if she had a syncopal episode but she recalls falling over.  No trauma is 

reported.  Subsequently upon evaluation in the emergency room she was found to 

be back in atrial fibrillation with rapid ventricular response and also was 

found to have a urinary tract infection.





Presently she continues to be in atrial fibrillation rapid ventricular response 

although minimally symptomatic.  She is continued systemic anticoagulation 

without any interruption and has tolerated this pretty well.





Past Medical History


Cardiac Medical History: Reports: Atrial Fibrillation, Coronary Artery Disease -

1 stent, Hyperlipidema, Hypertension


Pulmonary Medical History: Reports: Chronic Obstructive Pulmonary Disease (COPD)


Endocrine Medical History: Reports: Diabetes Mellitus Type 1 - Per the patient.,

Hypothyroidism


Malignancy Medical History: 


   Denies: Breast Cancer, Colorectal Cancer, Liver Cancer, Lung Cancer


GI Medical History: 


   Denies: Cirrhosis, Gastroesophageal Reflux Disease, Hiatal Hernia


Musculoskeltal Medical History: 


   Denies: Fibromyalgia


Skin Medical History: 


   Denies: Eczema, Psoriasis


Psychiatric Medical History: Reports: Depression


   Denies: Alcohol Dependency, Substance Abuse, Tobacco Dependency


Hematology: Reports: Anemia - Possible myelodysplasia


Infectious Medical History: Reports: None





Past Surgical History


Past Surgical History: Reports: Appendectomy, Cardiac Catheterization, Coronary 

Stent, Tubal Ligation





Social History


Lives with: Spouse/Significant other


Smoking Status: Never Smoker


Electronic Cigarette use?: No


Number of Years Smokin


Frequency of Alcohol Use: Rare


Hx Recreational Drug Use: No


Hx Prescription Drug Abuse: No





- Advance Directive


Resuscitation Status: Full Code





Family History


Family History: CAD, DM, Hypertension


Parental Family History Reviewed: No


Children Family History Reviewed: NA


Sibling(s) Family History Reviewed.: NA





Medication/Allergy


Home Medications: 








Amlodipine Besylate [Norvasc 5 mg Tablet] 5 mg PO DAILY 20 


Atorvastatin Calcium [Lipitor 20 mg Tablet] 20 mg PO QHS 20 


Citalopram Hydrobromide [Celexa] 10 mg PO DAILY 20 


Folic Acid [Folvite 1 mg Tablet] 1 mg PO DAILY 20 


Hydralazine HCl [Apresoline 25 mg Tablet] 25 mg PO Q12 20 


Insulin Lispro [Humalog Kwikpen U-100] 15 unit SQ TID 20 


Insulin NPH Human Isophane [Novolin N Flexpen] 20 unit SQ QPM 20 


Insulin NPH Human Isophane [Novolin N Flexpen] 25 unit SQ QAM 20 


Levothyroxine Sodium 88 mcg PO SUSA@0600 20 


Aspirin [Aspir-Low] 81 mg PO DAILY 20 


Bisoprolol Fumarate 10 mg PO DAILY 20 


Calcium Carbonate/Vitamin D3 [Calcium 600-D3 20Mcg(800 Unit)] 1 tab PO QHS 

20 


Cetirizine HCl [Zyrtec] 10 mg PO HSP PRN 20 


Levothyroxine Sodium [Levo-T] 75 mcg PO MOTUWETHFR@1000 20 


Lisinopril/Hydrochlorothiazide [Lisinopril-Hctz 20-12.5 mg Tab] 1 each PO DAILY 

20 


Multivit with Calcium,Iron,Min [Multiple Vitamins For Women] 1 each PO DAILY 

20 


Acetaminophen [Tylenol 325 mg Tablet] 650 mg PO Q4HP PRN  tablet 20 


Albuterol Sulfate [Proair HFA Inhalation Aerosol 8.5 gm MDI] 2 puff IH Q4HP PRN 

#1 hfa.aer.ad 20 


Pantoprazole Sodium [Protonix 40 mg Dr Tablet] 40 mg PO QAM #30 tablet.dr 

20 


Apixaban [Eliquis 5 mg Tablet] 5 mg PO BID  tablet 20 








Allergies/Adverse Reactions: 


                                        





Sulfa (Sulfonamide Antibiotics) Allergy (Verified 08/10/20 11:45)


   











Review of Systems


Constitutional: PRESENT: as per HPI, fatigue, weakness


Cardiovascular: PRESENT: as per HPI, dyspnea on exertion


Neurological: PRESENT: as per HPI, weakness





Physical Exam


Vital Signs: 


                                        











Temp Pulse Resp BP Pulse Ox


 


 98.0 F   117 H  20   116/60   100 


 


 20 11:15  20 14:00  20 11:15  20 11:15  20 11:15








                                 Intake & Output











 08/10/20 08/11/20 08/12/20





 06:59 06:59 06:59


 


Intake Total  3250 40


 


Output Total  1750 


 


Balance  1500 40


 


Weight  73.1 kg 











General appearance: PRESENT: no acute distress, cooperative, well-developed, 

well-nourished


Head exam: PRESENT: atraumatic


Eye exam: PRESENT: conjunctiva pink


Mouth exam: PRESENT: moist


Respiratory exam: PRESENT: symmetrical, unlabored


Cardiovascular exam: PRESENT: irregular rhythm, +S1, +S2, tachycardia


Pulses: PRESENT: normal radial pulses


GI/Abdominal exam: PRESENT: soft


Rectal exam: PRESENT: deferred


Neurological exam: PRESENT: alert, awake, oriented to person, oriented to place,

oriented to time, oriented to situation


Psychiatric exam: PRESENT: appropriate affect


Skin exam: PRESENT: dry, intact





Results


Laboratory Results: 


                                        





                                 20 05:25 





                                 20 05:25 





                                        











  08/10/20 08/11/20 08/11/20





  17:36 05:25 05:25


 


WBC   6.9 


 


RBC   3.51 L 


 


Hgb   9.9 L 


 


Hct   29.2 L 


 


MCV   83 


 


MCH   28.2 


 


MCHC   33.9 


 


RDW   15.3 H 


 


Plt Count   204 


 


Seg Neutrophils %   82.0 H 


 


Sodium  122.8 L   127.7 L


 


Potassium  3.5 L   3.8


 


Chloride  92 L   97 L


 


Carbon Dioxide  23   25


 


Anion Gap  8   6


 


BUN  19   17


 


Creatinine  1.17   0.91


 


Est GFR ( Amer)  56 L   > 60


 


Glucose  168 H   82


 


Calcium  7.8 L   7.8 L


 


Phosphorus    2.9


 


Magnesium    2.0


 


Albumin    2.8 L








                                        











  08/10/20 08/10/20 08/10/20





  13:15 13:41 17:36


 


Creatine Kinase  73  


 


Troponin I   0.081  0.081














  08/10/20 08/11/20





  23:25 05:25


 


Creatine Kinase  


 


Troponin I  0.064  0.060











EKG Comments: 





Twelve-lead EKG 2028.  Independently reviewed by me.


Atrial fibrillation with rapid ventricular response 120 bpm





8/10/2020 independently reviewed by me.


Atrial fibrillation, rapid ventricular response, ventricular rate is 146 bpm.





Chest x-ray 8/10/2020


Borderline cardiomegaly.  No acute pulmonary findings





Urine analysis 8/10/2020


Moderate leukocyte Estrace





Transesophageal echocardiogram 8/3/2020


Left ventricular ejection fraction 50 to 55%


RV is normal in size and function


Trace tricuspid regurgitation


No thrombus in the left atrial appendage


There is mild to moderate mitral regurgitation.





Transthoracic echocardiogram 2020


Left ventricular ejection fraction grossly normal


Mild MR mild to moderate TR


Impressions: 


                                        





Chest X-Ray  08/10/20 12:28


IMPRESSION:  1.  Borderline cardiomegaly.  No evidence for failure.


2.  No acute pulmonary findings.


 














Assessment & Plan





- Diagnosis


(1) Atrial fibrillation with RVR


Is this a current diagnosis for this admission?: Yes   


Plan: 


We will perform rate control for now.


Patient had good results with cardioversion initially so we will reattempt ca

rdioversion.  She likely went back into atrial fibrillation on account of 

urinary tract infection which probably acted as a trigger.


She has not interrupted her systemic anticoagulation so we can directly proceed 

with cardioversion








(2) Urinary tract infection


Qualifiers: 


   Urinary tract infection type: site unspecified   Hematuria presence: with 

hematuria   Qualified Code(s): N39.0 - Urinary tract infection, site not 

specified; R31.9 - Hematuria, unspecified   


Is this a current diagnosis for this admission?: Yes   


Plan: 


Patient receiving intravenous antibiotic therapy for urinary tract infection








- Notes


Notes: 





keep n.p.o. past midnight


Continue apixaban


We will plan on cardioversion tomorrow please keep n.p.o. past midnight

## 2020-08-12 LAB
ALBUMIN SERPL-MCNC: 3.2 G/DL (ref 3.5–5)
ANION GAP SERPL CALC-SCNC: 7 MMOL/L (ref 5–19)
APPEARANCE UR: CLEAR
APTT PPP: YELLOW S
BILIRUB UR QL STRIP: NEGATIVE
BUN SERPL-MCNC: 9 MG/DL (ref 7–20)
CALCIUM: 8.1 MG/DL (ref 8.4–10.2)
CHLORIDE SERPL-SCNC: 99 MMOL/L (ref 98–107)
CO2 SERPL-SCNC: 24 MMOL/L (ref 22–30)
ERYTHROCYTE [DISTWIDTH] IN BLOOD BY AUTOMATED COUNT: 15.2 % (ref 11.5–14)
GLUCOSE SERPL-MCNC: 72 MG/DL (ref 75–110)
GLUCOSE UR STRIP-MCNC: >=500 MG/DL
HCT VFR BLD CALC: 28.2 % (ref 36–47)
HGB BLD-MCNC: 9.6 G/DL (ref 12–15.5)
KETONES UR STRIP-MCNC: 20 MG/DL
MCH RBC QN AUTO: 28.3 PG (ref 27–33.4)
MCHC RBC AUTO-ENTMCNC: 34.2 G/DL (ref 32–36)
MCV RBC AUTO: 83 FL (ref 80–97)
NITRITE UR QL STRIP: NEGATIVE
PH UR STRIP: 6 [PH] (ref 5–9)
PHOSPHATE SERPL-MCNC: 2 MG/DL (ref 2.5–4.5)
PLATELET # BLD: 203 10^3/UL (ref 150–450)
POTASSIUM SERPL-SCNC: 4.1 MMOL/L (ref 3.6–5)
PROT UR STRIP-MCNC: 100 MG/DL
RBC # BLD AUTO: 3.4 10^6/UL (ref 3.72–5.28)
SP GR UR STRIP: 1.02
UROBILINOGEN UR-MCNC: NEGATIVE MG/DL (ref ?–2)
WBC # BLD AUTO: 6.3 10^3/UL (ref 4–10.5)

## 2020-08-12 PROCEDURE — 5A2204Z RESTORATION OF CARDIAC RHYTHM, SINGLE: ICD-10-PCS | Performed by: INTERNAL MEDICINE

## 2020-08-12 RX ADMIN — APIXABAN SCH MG: 5 TABLET, FILM COATED ORAL at 17:53

## 2020-08-12 RX ADMIN — INSULIN LISPRO SCH: 100 INJECTION, SOLUTION INTRAVENOUS; SUBCUTANEOUS at 17:59

## 2020-08-12 RX ADMIN — FOLIC ACID SCH: 1 TABLET ORAL at 11:10

## 2020-08-12 RX ADMIN — APIXABAN SCH: 5 TABLET, FILM COATED ORAL at 11:10

## 2020-08-12 RX ADMIN — METOPROLOL TARTRATE SCH MG: 25 TABLET, FILM COATED ORAL at 21:42

## 2020-08-12 RX ADMIN — Medication SCH: at 06:24

## 2020-08-12 RX ADMIN — Medication SCH ML: at 22:00

## 2020-08-12 RX ADMIN — OXYCODONE AND ACETAMINOPHEN PRN TAB: 5; 325 TABLET ORAL at 17:53

## 2020-08-12 RX ADMIN — ATORVASTATIN CALCIUM SCH MG: 40 TABLET, FILM COATED ORAL at 21:41

## 2020-08-12 RX ADMIN — Medication SCH ML: at 17:54

## 2020-08-12 RX ADMIN — INSULIN HUMAN SCH UNIT: 100 INJECTION, SUSPENSION SUBCUTANEOUS at 21:53

## 2020-08-12 RX ADMIN — INSULIN LISPRO SCH: 100 INJECTION, SOLUTION INTRAVENOUS; SUBCUTANEOUS at 17:57

## 2020-08-12 RX ADMIN — CETIRIZINE HYDROCHLORIDE SCH: 10 TABLET, FILM COATED ORAL at 11:11

## 2020-08-12 RX ADMIN — INSULIN HUMAN SCH: 100 INJECTION, SUSPENSION SUBCUTANEOUS at 11:08

## 2020-08-12 RX ADMIN — PANTOPRAZOLE SODIUM SCH MG: 40 TABLET, DELAYED RELEASE ORAL at 05:25

## 2020-08-12 RX ADMIN — INSULIN LISPRO SCH: 100 INJECTION, SOLUTION INTRAVENOUS; SUBCUTANEOUS at 11:05

## 2020-08-12 RX ADMIN — LEVOTHYROXINE SODIUM SCH MG: 75 TABLET ORAL at 05:25

## 2020-08-12 RX ADMIN — INSULIN LISPRO SCH UNIT: 100 INJECTION, SOLUTION INTRAVENOUS; SUBCUTANEOUS at 21:51

## 2020-08-12 RX ADMIN — LEVOFLOXACIN SCH: 500 TABLET, FILM COATED ORAL at 11:11

## 2020-08-12 RX ADMIN — METOPROLOL TARTRATE SCH: 25 TABLET, FILM COATED ORAL at 11:11

## 2020-08-12 RX ADMIN — ACETAMINOPHEN PRN MG: 325 TABLET ORAL at 00:51

## 2020-08-12 RX ADMIN — CITALOPRAM HYDROBROMIDE SCH: 20 TABLET ORAL at 11:10

## 2020-08-12 RX ADMIN — Medication SCH: at 11:10

## 2020-08-12 NOTE — PDOC PROGRESS REPORT
Subjective


Progress Note for:: 08/12/20


Subjective:: 





64 year old female well-known to me from her recent admission.  She has a 

history of coronary artery disease and was recently cardioverted after JENNIFER exam 

last week.  She states that she felt good when she got out of the hospital.  On 

Sunday she began to feel tired and she felt "off ".  When she woke up today she 

states that she still felt quite tired but thought if she took a shower she 

would feel better.  She was in the shower when she became lightheaded and "fe

ll".  She states she fell against the door which opened thankfully and did not 

break.  She had several sore areas but no significant injuries.  She was going 

to her primary care provider's office and was instructed to go to the hospital. 

In the emergency department she was found to be hypotensive, back in atrial 

fibrillation with rapid ventricular response with a sodium of 121, potassium 

3.5, magnesium 1.4 and a glucose of 232.  Her urinalysis was markedly positive 

suggesting infection.  The patient will receive IV fluids and antibiotics.  We 

will correct her electrolytes.  Her troponin was 0.08 and this could be due to 

the rapid ventricular response however I will obtain serial troponin studies.  

The patient will be admitted to IMCU.








8/11/20-patient is comfortable in the bed communicating well.  Still 

tachycardic.  Serum sodium came up to 127.7.  Patient is receiving IV Rocephin 

for UTI.  Discussed with Dr. Rubio is going to come and see the patient.





8/12/[id number]-fdnj-old female admitted with atrial for with rapid ventricular 

rate.  Patient has a recent cardioversion at that time found to have a normal 

ejection fraction no right atrial appendage thrombus seen.  Patient is scheduled

for cardioversion again this afternoon.  Plan is to continue Eliquis at this 

time and the patient is n.p.o. from midnight.


Reason For Visit: 


ATRIAL FIBRILLATION WITH RAPID VENTRICULAR








Physical Exam


Vital Signs: 


                                        











Temp Pulse Resp BP Pulse Ox


 


 98.1 F   90   16   127/85 H  98 


 


 08/12/20 07:29  08/12/20 07:29  08/12/20 07:29  08/12/20 07:29  08/12/20 07:29








                                 Intake & Output











 08/11/20 08/12/20 08/13/20





 06:59 06:59 06:59


 


Intake Total 3300 2384 


 


Output Total 1750 400 


 


Balance 1550 1984 


 


Weight 73.1 kg 74.6 kg 











General appearance: PRESENT: no acute distress, well-developed


Head exam: PRESENT: atraumatic


Eye exam: PRESENT: PERRLA


Ear exam: PRESENT: normal external ear exam


Mouth exam: PRESENT: neck supple


Teeth exam: PRESENT: poor dentation


Neck exam: ABSENT: carotid bruit, JVD, lymphadenopathy, thyromegaly


Respiratory exam: PRESENT: clear to auscultation steven.  ABSENT: rales, rhonchi, 

wheezes


Cardiovascular exam: PRESENT: irregular rhythm, tachycardia


Pulses: PRESENT: normal dorsalis pedis pul


GI/Abdominal exam: PRESENT: normal bowel sounds, soft.  ABSENT: distended, 

guarding, mass, organolmegaly, rebound, tenderness


Rectal exam: PRESENT: deferred


Extremities exam: PRESENT: full ROM.  ABSENT: calf tenderness, clubbing, pedal 

edema


Neurological exam: PRESENT: alert, awake, oriented to person, oriented to place,

oriented to time, oriented to situation, CN II-XII grossly intact.  ABSENT: 

motor sensory deficit


Psychiatric exam: PRESENT: appropriate affect, normal mood.  ABSENT: homicidal 

ideation, suicidal ideation





Results


Laboratory Results: 


                                        





                                 08/12/20 05:49 





                                 08/12/20 05:49 





                                        











  08/12/20 08/12/20





  05:49 05:49


 


WBC   6.3


 


RBC   3.40 L


 


Hgb   9.6 L


 


Hct   28.2 L


 


MCV   83


 


MCH   28.3


 


MCHC   34.2


 


RDW   15.2 H


 


Plt Count   203


 


Sodium  129.8 L 


 


Potassium  4.1 


 


Chloride  99 


 


Carbon Dioxide  24 


 


Anion Gap  7 


 


BUN  9 


 


Creatinine  0.68 


 


Est GFR (African Amer)  > 60 


 


Glucose  72 L 


 


Calcium  8.1 L 


 


Phosphorus  2.0 L 


 


Magnesium  1.7 


 


Albumin  3.2 L 








                                        











  08/10/20 08/10/20 08/10/20





  13:15 13:41 17:36


 


Creatine Kinase  73  


 


Troponin I   0.081  0.081














  08/10/20 08/11/20





  23:25 05:25


 


Creatine Kinase  


 


Troponin I  0.064  0.060











Impressions: 


                                        





Chest X-Ray  08/10/20 12:28


IMPRESSION:  1.  Borderline cardiomegaly.  No evidence for failure.


2.  No acute pulmonary findings.


 














Assessment and Plan





- Diagnosis


(1) Atrial fibrillation with RVR


Is this a current diagnosis for this admission?: Yes   


Plan: 


The patient is normally on bisoprolol 10 mg daily.  I will use small doses of 

metoprolol tartrate initially because of her low blood pressure.  PRN 

intravenous metoprolol is available as well.  She is already on apixaban.





8/11/2020-patient is on apixaban came in with A. fib with RVR patient recently 

had a cardioversion/JENNIFER Dr. Adams is going to evaluate the patient for further

management.  Presently patient is on metoprolol 25 mg p.o. every 12 hours and 

metoprolol 2.5 mg IV every 6 hours as needed.





8/12/2020-patient admitted with atrial fib with rapid ventricular rate on 

Eliquis.  Patient is going for cardioversion today.








(2) Urinary tract infection


Qualifiers: 


   Urinary tract infection type: site unspecified   Hematuria presence: with 

hematuria   Qualified Code(s): N39.0 - Urinary tract infection, site not 

specified; R31.9 - Hematuria, unspecified   


Is this a current diagnosis for this admission?: Yes   


Plan: 


Urine culture is pending.  The patient will be on intravenous ceftriaxone until 

identification and sensitivities are available.





8/11/20-urine culture came back positive for gram-positive cocci in chains.  

Plan is to continue IV ceftriaxone at this time.





8/12/2020-urine culture came back positive for E faecalis.  Presently on 

Rocephin IV plan is to switch medication to levofloxacin 40 mg p.o. daily.








(3) Hyponatremia


Is this a current diagnosis for this admission?: Yes   


Plan: 


Sodium levels should begin to correct with normal saline.  We will monitor 

closely.  Once fully hydrated the patient might benefit from chronic fluid 

restriction.





8/11/20-serum sodium came up to 127.7.  Patient is on normal saline at 125 cc/h 

with potassium supplementations plan is to decrease the rate to 75 cc/h.





8/12/2020-serum sodium is improving it is 129.8 today.  Patient is on normal 

saline at 75 cc/h.  Plan is to decrease the fluids to 50 cc/h from today.  

Because of hyponatremia may be secondary to medications.








(4) Coronary artery disease


Qualifiers: 


   Coronary Disease-Associated Artery/Lesion type: native artery   Native vs. 

transplanted heart: native heart   Associated angina: without angina   Qualified

Code(s): I25.10 - Atherosclerotic heart disease of native coronary artery witho

ut angina pectoris   


Is this a current diagnosis for this admission?: Yes   


Plan: 


We will continue her aspirin and statin therapy.  We will add back her 

antihypertensive medications based on her blood pressure.








(5) Hypertension


Qualifiers: 


   Hypertension type: essential hypertension   Qualified Code(s): I10 - 

Essential (primary) hypertension   


Is this a current diagnosis for this admission?: No   


Plan: 


Usually on multiple medications.  Blood pressure was low on admission.  We will 

start with low-dose metoprolol.  As blood pressure improves gradually add back 

antihypertensive medications.





8/11/2020-patient blood pressure today is 115/75.  Stable.  Plan is to continue 

metoprolol 25 mg p.o. twice daily at this time.





8/12/2020-blood pressure today is 122/60.  Stable.  Plan is to continue 

metoprolol 25 mg p.o. twice a day.








(6) Hyperglycemia due to diabetes mellitus


Is this a current diagnosis for this admission?: Yes   


Plan: 


Controlled carbohydrate diet.  Accu-Cheks and sliding scale.  Continue home NPH 

regimen of 25 units in the morning and 20 units in the evening.





8/11/2020-patient blood sugar is 82 this morning.  Plan is to decrease lantus to

15 units before meals and 15 units nightly.  Check for hemoglobin A1c.  Diet 

exercise complicated medications discussed with the patient.





8/12/2020-blood sugar this morning is 97.  Patient is receiving Lantus 15 units 

before meals and 15 units at night.  Hemoglobin A1c is 7.1.  Diet exercise weig

ht loss lifestyle modifications discussed with the patient again.








(7) Hypomagnesemia


Is this a current diagnosis for this admission?: Yes   


Plan: 


Supplement magnesium and monitor





8/11/2020-serum magnesium today is 2.0.  Stable.








(8) COPD (chronic obstructive pulmonary disease) with chronic bronchitis


Is this a current diagnosis for this admission?: Yes   


Plan: 


Asymptomatic at this time.  Only uses rescue inhaler at home.








(9) Hypotension


Qualifiers: 


   Hypotension type: hypotension due to hypovolemia   Qualified Code(s): I95.89 

- Other hypotension; E86.1 - Hypovolemia   


Is this a current diagnosis for this admission?: Yes   


Plan: 


Resolved with IV fluids.  Gradually resume antihypertensive medications as blood

pressure increases.





8/11/2020-blood pressure today is 115/75.  Stable.  Plan is to decrease the IV 

fluids to 75 cc/h.





8/12/2020-hypotension is resolving.  Latest blood pressure is 122/60.  Plan is 

to decrease the IV fluids to 50 cc/h from today.








(10) Depression


Qualifiers: 


   Depression Type: unspecified   Qualified Code(s): F32.9 - Major depressive 

disorder, single episode, unspecified   


Is this a current diagnosis for this admission?: No   


Plan: 


Continue Celexa








- Time


Anticipated Discharge Disposition: Home, Self Care


Anticipated Discharge Timeframe: within 48 hours

## 2020-08-12 NOTE — PROGRESS NOTE
Provider Note


Provider Note: 





DIRECT CURRENT CARDIOVERSION


Date : August 12, 2020


Procedure: Direct current cardioversion


Anesthesia: General anesthesia


Indication: Atrial fibrillation 





Clinical history: 64-year-old lady who developed new onset atrial fibrillation 

and was initially managed with rate control.  She was admitted to hospital last 

week.  At that time we performed transesophageal echocardiogram which showed 

preserved left ventricular ejection fraction and no left atrial appendage 

thrombus.  Cardioversion was performed and was successful.  Upon discharge she 

developed a urinary tract infection and subsequently was found to be in atrial 

fibrillation again.  Cardioversion was requested yet again.  Patient had been 

maintained on apixaban 5 mg twice daily post cardioversion and has not missed 

any doses-in fact I verbally affirmed with the patient.





PROCEDURE


The patient was brought to the holding area fasting and nonsedated state.  

Informed consent was obtained prior to the procedure.  General anesthesia was 

administered.  Please see anesthesia notes for medication details.


Patient's presenting rhythm was atrial fibrillation with rapid ventricular 

response.  The patient's EKG and vital signs were monitored throughout.  Once 

the patient was comfortably sedated a single direct current biphasic 200 J shock

was administered across defibrillator patches applied in anteroposterior fashion

across the chest.  This resulted in prompt restoration of sinus rhythm with 

frequent premature atrial complexes.  The patient tolerated the procedure well.





Conclusion


Successful conversion of atrial fibrillation to sinus rhythm





Plan


Continue systemic anticoagulation-apixaban 5 mg twice daily without interruption


Continue low-dose beta-blocker

## 2020-08-13 LAB
ADD MANUAL DIFF: NO
ALBUMIN SERPL-MCNC: 3.7 G/DL (ref 3.5–5)
ALP SERPL-CCNC: 113 U/L (ref 38–126)
ANION GAP SERPL CALC-SCNC: 8 MMOL/L (ref 5–19)
AST SERPL-CCNC: 33 U/L (ref 14–36)
BASOPHILS # BLD AUTO: 0.1 10^3/UL (ref 0–0.2)
BASOPHILS NFR BLD AUTO: 1 % (ref 0–2)
BILIRUB DIRECT SERPL-MCNC: 0.1 MG/DL (ref 0–0.4)
BILIRUB SERPL-MCNC: 0.5 MG/DL (ref 0.2–1.3)
BUN SERPL-MCNC: 12 MG/DL (ref 7–20)
CALCIUM: 8.5 MG/DL (ref 8.4–10.2)
CHLORIDE SERPL-SCNC: 98 MMOL/L (ref 98–107)
CO2 SERPL-SCNC: 24 MMOL/L (ref 22–30)
EOSINOPHIL # BLD AUTO: 0.3 10^3/UL (ref 0–0.6)
EOSINOPHIL NFR BLD AUTO: 5.5 % (ref 0–6)
ERYTHROCYTE [DISTWIDTH] IN BLOOD BY AUTOMATED COUNT: 15.5 % (ref 11.5–14)
GLUCOSE SERPL-MCNC: 142 MG/DL (ref 75–110)
HCT VFR BLD CALC: 29.7 % (ref 36–47)
HGB BLD-MCNC: 10 G/DL (ref 12–15.5)
LYMPHOCYTES # BLD AUTO: 1 10^3/UL (ref 0.5–4.7)
LYMPHOCYTES NFR BLD AUTO: 18.9 % (ref 13–45)
MCH RBC QN AUTO: 28.2 PG (ref 27–33.4)
MCHC RBC AUTO-ENTMCNC: 33.9 G/DL (ref 32–36)
MCV RBC AUTO: 83 FL (ref 80–97)
MONOCYTES # BLD AUTO: 0.3 10^3/UL (ref 0.1–1.4)
MONOCYTES NFR BLD AUTO: 6.4 % (ref 3–13)
NEUTROPHILS # BLD AUTO: 3.7 10^3/UL (ref 1.7–8.2)
NEUTS SEG NFR BLD AUTO: 68.2 % (ref 42–78)
PHOSPHATE SERPL-MCNC: 2.4 MG/DL (ref 2.5–4.5)
PLATELET # BLD: 215 10^3/UL (ref 150–450)
POTASSIUM SERPL-SCNC: 4.7 MMOL/L (ref 3.6–5)
PROT SERPL-MCNC: 7.5 G/DL (ref 6.3–8.2)
RBC # BLD AUTO: 3.56 10^6/UL (ref 3.72–5.28)
TOTAL CELLS COUNTED % (AUTO): 100 %
WBC # BLD AUTO: 5.4 10^3/UL (ref 4–10.5)

## 2020-08-13 RX ADMIN — PANTOPRAZOLE SODIUM SCH MG: 40 TABLET, DELAYED RELEASE ORAL at 05:58

## 2020-08-13 RX ADMIN — OXYCODONE AND ACETAMINOPHEN PRN TAB: 5; 325 TABLET ORAL at 00:20

## 2020-08-13 RX ADMIN — Medication SCH ML: at 17:14

## 2020-08-13 RX ADMIN — CETIRIZINE HYDROCHLORIDE SCH MG: 10 TABLET, FILM COATED ORAL at 10:26

## 2020-08-13 RX ADMIN — OXYCODONE AND ACETAMINOPHEN PRN TAB: 5; 325 TABLET ORAL at 13:44

## 2020-08-13 RX ADMIN — OXYCODONE AND ACETAMINOPHEN PRN TAB: 5; 325 TABLET ORAL at 21:23

## 2020-08-13 RX ADMIN — APIXABAN SCH MG: 5 TABLET, FILM COATED ORAL at 17:10

## 2020-08-13 RX ADMIN — FOLIC ACID SCH MG: 1 TABLET ORAL at 10:27

## 2020-08-13 RX ADMIN — INSULIN LISPRO SCH UNIT: 100 INJECTION, SOLUTION INTRAVENOUS; SUBCUTANEOUS at 22:31

## 2020-08-13 RX ADMIN — INSULIN HUMAN SCH UNIT: 100 INJECTION, SUSPENSION SUBCUTANEOUS at 08:41

## 2020-08-13 RX ADMIN — Medication SCH ML: at 05:59

## 2020-08-13 RX ADMIN — Medication SCH ML: at 21:26

## 2020-08-13 RX ADMIN — LEVOTHYROXINE SODIUM SCH MG: 75 TABLET ORAL at 05:58

## 2020-08-13 RX ADMIN — METOPROLOL TARTRATE PRN MG: 5 INJECTION, SOLUTION INTRAVENOUS at 08:05

## 2020-08-13 RX ADMIN — Medication SCH TAB: at 10:26

## 2020-08-13 RX ADMIN — INSULIN HUMAN SCH UNIT: 100 INJECTION, SUSPENSION SUBCUTANEOUS at 22:32

## 2020-08-13 RX ADMIN — INSULIN LISPRO SCH UNIT: 100 INJECTION, SOLUTION INTRAVENOUS; SUBCUTANEOUS at 12:42

## 2020-08-13 RX ADMIN — MAGNESIUM OXIDE TAB 400 MG (241.3 MG ELEMENTAL MG) SCH MG: 400 (241.3 MG) TAB at 17:09

## 2020-08-13 RX ADMIN — INSULIN LISPRO SCH UNIT: 100 INJECTION, SOLUTION INTRAVENOUS; SUBCUTANEOUS at 17:11

## 2020-08-13 RX ADMIN — MAGNESIUM OXIDE TAB 400 MG (241.3 MG ELEMENTAL MG) SCH MG: 400 (241.3 MG) TAB at 10:27

## 2020-08-13 RX ADMIN — CITALOPRAM HYDROBROMIDE SCH MG: 20 TABLET ORAL at 10:26

## 2020-08-13 RX ADMIN — ATORVASTATIN CALCIUM SCH MG: 40 TABLET, FILM COATED ORAL at 21:23

## 2020-08-13 RX ADMIN — APIXABAN SCH MG: 5 TABLET, FILM COATED ORAL at 10:25

## 2020-08-13 RX ADMIN — LEVOFLOXACIN SCH MG: 500 TABLET, FILM COATED ORAL at 10:26

## 2020-08-13 RX ADMIN — METOPROLOL TARTRATE SCH MG: 25 TABLET, FILM COATED ORAL at 21:23

## 2020-08-13 RX ADMIN — INSULIN LISPRO SCH UNIT: 100 INJECTION, SOLUTION INTRAVENOUS; SUBCUTANEOUS at 08:39

## 2020-08-13 NOTE — PDOC PROGRESS REPORT
Subjective


Progress Note for:: 08/13/20


Subjective:: 





64 year old female well-known to me from her recent admission.  She has a 

history of coronary artery disease and was recently cardioverted after JENNIFER exam 

last week.  She states that she felt good when she got out of the hospital.  On 

Sunday she began to feel tired and she felt "off ".  When she woke up today she 

states that she still felt quite tired but thought if she took a shower she 

would feel better.  She was in the shower when she became lightheaded and "fe

ll".  She states she fell against the door which opened thankfully and did not 

break.  She had several sore areas but no significant injuries.  She was going 

to her primary care provider's office and was instructed to go to the hospital. 

In the emergency department she was found to be hypotensive, back in atrial 

fibrillation with rapid ventricular response with a sodium of 121, potassium 

3.5, magnesium 1.4 and a glucose of 232.  Her urinalysis was markedly positive 

suggesting infection.  The patient will receive IV fluids and antibiotics.  We 

will correct her electrolytes.  Her troponin was 0.08 and this could be due to 

the rapid ventricular response however I will obtain serial troponin studies.  

The patient will be admitted to IMCU.








8/11/20-patient is comfortable in the bed communicating well.  Still 

tachycardic.  Serum sodium came up to 127.7.  Patient is receiving IV Rocephin 

for UTI.  Discussed with Dr. Atkinson is going to come and see the patient.





8/12/[id number]-wtak-old female admitted with atrial for with rapid ventricular 

rate.  Patient has a recent cardioversion at that time found to have a normal 

ejection fraction no right atrial appendage thrombus seen.  Patient is scheduled

for cardioversion again this afternoon.  Plan is to continue Eliquis at this 

time and the patient is n.p.o. from midnight.





8/13/20-patient has cardioversion yesterday twice, patient is back in A. fib 

with heart rate is around 124 this morning.  I spoke to Dr. Atkinson his 

recommendation is to adjust the beta-blockers at this time.  Plan is to continue

Eliquis at this time.


Reason For Visit: 


ATRIAL FIBRILLATION WITH RAPID VENTRICULAR








Physical Exam


Vital Signs: 


                                        











Temp Pulse Resp BP Pulse Ox


 


 98.2 F   144 H  19   145/89 H  98 


 


 08/13/20 07:35  08/13/20 07:35  08/13/20 07:35  08/13/20 07:35  08/13/20 07:35








                                 Intake & Output











 08/12/20 08/13/20 08/14/20





 06:59 06:59 06:59


 


Intake Total 2384 1860 


 


Output Total 400  


 


Balance 1984 1860 


 


Weight 74.6 kg 74.9 kg 











General appearance: PRESENT: no acute distress, well-developed


Head exam: PRESENT: atraumatic


Eye exam: PRESENT: PERRLA


Mouth exam: PRESENT: dry mucosa


Teeth exam: PRESENT: poor dentation


Neck exam: ABSENT: carotid bruit, JVD, lymphadenopathy, thyromegaly


Respiratory exam: PRESENT: decreased breath sounds


Cardiovascular exam: PRESENT: irregular rhythm, tachycardia


Pulses: PRESENT: normal dorsalis pedis pul


GI/Abdominal exam: PRESENT: normal bowel sounds, soft.  ABSENT: distended, 

guarding, mass, organolmegaly, rebound, tenderness


Rectal exam: PRESENT: deferred


Extremities exam: PRESENT: full ROM.  ABSENT: calf tenderness, clubbing, pedal 

edema


Neurological exam: PRESENT: alert, awake, oriented to person, oriented to place,

oriented to time, oriented to situation, CN II-XII grossly intact.  ABSENT: 

motor sensory deficit


Psychiatric exam: PRESENT: appropriate affect, normal mood.  ABSENT: homicidal 

ideation, suicidal ideation





Results


Laboratory Results: 


                                        





                                 08/13/20 06:39 





                                 08/13/20 06:39 





                                        











  08/12/20 08/13/20 08/13/20





  22:09 06:39 06:39


 


WBC    5.4


 


RBC    3.56 L


 


Hgb    10.0 L


 


Hct    29.7 L


 


MCV    83


 


MCH    28.2


 


MCHC    33.9


 


RDW    15.5 H


 


Plt Count    215


 


Seg Neutrophils %    68.2


 


Sodium   129.8 L 


 


Potassium   4.7 


 


Chloride   98 


 


Carbon Dioxide   24 


 


Anion Gap   8 


 


BUN   12 


 


Creatinine   0.77 


 


Est GFR ( Amer)   > 60 


 


Glucose   142 H 


 


Calcium   8.5 


 


Phosphorus   2.4 L 


 


Magnesium   1.6 


 


Total Bilirubin   0.5 


 


AST   33 


 


Alkaline Phosphatase   113 


 


Total Protein   7.5 


 


Albumin   3.7 


 


Urine Color  YELLOW  


 


Urine Appearance  CLEAR  


 


Urine pH  6.0  


 


Ur Specific Gravity  1.020  


 


Urine Protein  100 H  


 


Urine Glucose (UA)  >=500 H  


 


Urine Ketones  20 H  


 


Urine Blood  NEGATIVE  


 


Urine Nitrite  NEGATIVE  


 


Ur Leukocyte Esterase  MODERATE H  


 


Urine WBC (Auto)  7  


 


Urine RBC (Auto)  3  








                                        











  08/10/20 08/10/20 08/10/20





  13:15 13:41 17:36


 


Creatine Kinase  73  


 


Troponin I   0.081  0.081














  08/10/20 08/11/20





  23:25 05:25


 


Creatine Kinase  


 


Troponin I  0.064  0.060











Impressions: 


                                        





Chest X-Ray  08/10/20 12:28


IMPRESSION:  1.  Borderline cardiomegaly.  No evidence for failure.


2.  No acute pulmonary findings.


 














Assessment and Plan





- Diagnosis


(1) Atrial fibrillation with RVR


Is this a current diagnosis for this admission?: Yes   


Plan: 


The patient is normally on bisoprolol 10 mg daily.  I will use small doses of 

metoprolol tartrate initially because of her low blood pressure.  PRN 

intravenous metoprolol is available as well.  She is already on apixaban.





8/11/2020-patient is on apixaban came in with A. fib with RVR patient recently 

had a cardioversion/JENNIFER Dr. Adams is going to evaluate the patient for further

management.  Presently patient is on metoprolol 25 mg p.o. every 12 hours and 

metoprolol 2.5 mg IV every 6 hours as needed.





8/12/2020-patient admitted with atrial fib with rapid ventricular rate on 

Eliquis.  Patient is going for cardioversion today.





8/13/2020-patient is back in A. fib with RVR heart rate is around 124.  Plan is 

to continue metoprolol 2.5 mg IV every 6 as needed, dose of metoprolol is 

increased to 50 mg p.o. every 12 hours.  Dr. atkinson is notified.  Plan is to 

continue Eliquis at this time.








(2) Urinary tract infection


Qualifiers: 


   Urinary tract infection type: site unspecified   Hematuria presence: with 

hematuria   Qualified Code(s): N39.0 - Urinary tract infection, site not 

specified; R31.9 - Hematuria, unspecified   


Is this a current diagnosis for this admission?: Yes   


Plan: 


Urine culture is pending.  The patient will be on intravenous ceftriaxone until 

identification and sensitivities are available.





8/11/20-urine culture came back positive for gram-positive cocci in chains.  

Plan is to continue IV ceftriaxone at this time.





8/12/2020-urine culture came back positive for E faecalis.  Presently on 

Rocephin IV plan is to switch medication to levofloxacin 500 mg p.o. daily.





8/13/2020-urine culture is positive for Enterococcus faecalis on levofloxacin 

500 mg daily.








(3) Hyponatremia


Is this a current diagnosis for this admission?: Yes   


Plan: 


Sodium levels should begin to correct with normal saline.  We will monitor 

closely.  Once fully hydrated the patient might benefit from chronic fluid 

restriction.





8/11/20-serum sodium came up to 127.7.  Patient is on normal saline at 125 cc/h 

with potassium supplementations plan is to decrease the rate to 75 cc/h.





8/12/2020-serum sodium is improving it is 129.8 today.  Patient is on normal 

saline at 75 cc/h.  Plan is to decrease the fluids to 50 cc/h from today.  

Because of hyponatremia may be secondary to medications.





8/13/2020-serum sodium is 129.8.  Stable.  Presently on IV fluids 50 cc/h.  Plan

is to continue to for the labs on regular basis.








(4) Coronary artery disease


Qualifiers: 


   Coronary Disease-Associated Artery/Lesion type: native artery   Native vs. 

transplanted heart: native heart   Associated angina: without angina   Qualified

Code(s): I25.10 - Atherosclerotic heart disease of native coronary artery w

[facility] angina pectoris   


Is this a current diagnosis for this admission?: Yes   


Plan: 


We will continue her aspirin and statin therapy.  We will add back her 

antihypertensive medications based on her blood pressure.








(5) Hypertension


Qualifiers: 


   Hypertension type: essential hypertension   Qualified Code(s): I10 - 

Essential (primary) hypertension   


Is this a current diagnosis for this admission?: No   


Plan: 


Usually on multiple medications.  Blood pressure was low on admission.  We will 

start with low-dose metoprolol.  As blood pressure improves gradually add back 

antihypertensive medications.





8/11/2020-patient blood pressure today is 115/75.  Stable.  Plan is to continue 

metoprolol 25 mg p.o. twice daily at this time.





8/12/2020-blood pressure today is 122/60.  Stable.  Plan is to continue 

metoprolol 25 mg p.o. twice a day.





8/13/2020-blood pressure is 140/60.  Stable.  Metoprolol dose is increased to 50

mg p.o. twice daily and plan is to stop IV fluids from today.








(6) Hyperglycemia due to diabetes mellitus


Is this a current diagnosis for this admission?: Yes   


Plan: 


Controlled carbohydrate diet.  Accu-Cheks and sliding scale.  Continue home NPH 

regimen of 25 units in the morning and 20 units in the evening.





8/11/2020-patient blood sugar is 82 this morning.  Plan is to decrease lantus to

15 units before meals and 15 units nightly.  Check for hemoglobin A1c.  Diet 

exercise complicated medications discussed with the patient.





8/12/2020-blood sugar this morning is 97.  Patient is receiving Lantus 15 units 

before meals and 15 units at night.  Hemoglobin A1c is 7.1.  Diet exercise 

weight loss lifestyle modifications discussed with the patient again.





8/13/2020-latest blood sugar is 142.  Plan is to continue Lantus 15 units before

meals and 15 units at bedtime.








(7) Hypomagnesemia


Is this a current diagnosis for this admission?: Yes   


Plan: 


Supplement magnesium and monitor





8/11/2020-serum magnesium today is 2.0.  Stable.





1320-serum magnesium is 1.6 to start her on magnesium oxide 400 mg p.o. twice 

daily.








(8) COPD (chronic obstructive pulmonary disease) with chronic bronchitis


Is this a current diagnosis for this admission?: Yes   


Plan: 


Asymptomatic at this time.  Only uses rescue inhaler at home.








(9) Hypotension


Qualifiers: 


   Hypotension type: hypotension due to hypovolemia   Qualified Code(s): I95.89 

- Other hypotension; E86.1 - Hypovolemia   


Is this a current diagnosis for this admission?: Yes   


Plan: 


Resolved with IV fluids.  Gradually resume antihypertensive medications as blood

pressure increases.





8/11/2020-blood pressure today is 115/75.  Stable.  Plan is to decrease the IV 

fluids to 75 cc/h.





8/12/2020-hypotension is resolving.  Latest blood pressure is 122/60.  Plan is 

to decrease the IV fluids to 50 cc/h from today.








(10) Depression


Qualifiers: 


   Depression Type: unspecified   Qualified Code(s): F32.9 - Major depressive 

disorder, single episode, unspecified   


Is this a current diagnosis for this admission?: No   


Plan: 


Continue Celexa








- Time


Anticipated Discharge Disposition: Home, Self Care


Anticipated Discharge Timeframe: within 48 hours

## 2020-08-13 NOTE — EKG REPORT
SEVERITY:- ABNORMAL ECG -

ATRIAL FIBRILLATION

LOW VOLTAGE IN FRONTAL LEADS

CONSIDER LEFT VENTRICULAR HYPERTROPHY

:

Confirmed by: Ramiro Ta MD 13-Aug-2020 15:27:44

## 2020-08-13 NOTE — PDOC PROGRESS REPORT
Subjective


Progress Note for:: 08/13/20


Subjective:: 





Patient was cardioverted back to sinus rhythm yesterday.  This morning she went 

back into atrial fibrillation.  Beta-blocker dose was increased mildly.  Patient

did convert to sinus rhythm briefly with a 3.8-second pause but went back into 

atrial fibrillation.  She is relatively asymptomatic on both counts.  At the 

time of my evaluation she is comfortable and resting in bed with spouse by the 

bedside.  Continue to receive antibiotics for the treatment of urinary tract 

infection.


Reason For Visit: 


ATRIAL FIBRILLATION WITH RAPID VENTRICULAR








Physical Exam


Vital Signs: 


                                        











Temp Pulse Resp BP Pulse Ox


 


 97.6 F   82   18   153/77 H  99 


 


 08/13/20 12:03  08/13/20 14:00  08/13/20 12:03  08/13/20 12:03  08/13/20 12:03








                                 Intake & Output











 08/12/20 08/13/20 08/14/20





 06:59 06:59 06:59


 


Intake Total 2384 1860 240


 


Output Total 400  


 


Balance 1984 1860 240


 


Weight 74.6 kg 74.9 kg 74.9 kg











General appearance: PRESENT: no acute distress, cooperative, well-developed, 

well-nourished


Head exam: PRESENT: atraumatic, normocephalic


Eye exam: PRESENT: conjunctiva pink


Mouth exam: PRESENT: moist


Respiratory exam: PRESENT: clear to auscultation steven, symmetrical, unlabored


Cardiovascular exam: PRESENT: irregular rhythm, +S1, +S2


Pulses: PRESENT: normal radial pulses


GI/Abdominal exam: PRESENT: soft


Rectal exam: PRESENT: deferred


Neurological exam: PRESENT: alert, awake, oriented to person, oriented to place,

oriented to time, oriented to situation


Psychiatric exam: PRESENT: appropriate affect


Skin exam: PRESENT: dry, intact, normal color





Results


Laboratory Results: 


                                        





                                 08/13/20 06:39 





                                 08/13/20 06:39 





                                        











  08/12/20 08/13/20 08/13/20





  22:09 06:39 06:39


 


WBC    5.4


 


RBC    3.56 L


 


Hgb    10.0 L


 


Hct    29.7 L


 


MCV    83


 


MCH    28.2


 


MCHC    33.9


 


RDW    15.5 H


 


Plt Count    215


 


Seg Neutrophils %    68.2


 


Sodium   129.8 L 


 


Potassium   4.7 


 


Chloride   98 


 


Carbon Dioxide   24 


 


Anion Gap   8 


 


BUN   12 


 


Creatinine   0.77 


 


Est GFR ( Amer)   > 60 


 


Glucose   142 H 


 


Calcium   8.5 


 


Phosphorus   2.4 L 


 


Magnesium   1.6 


 


Total Bilirubin   0.5 


 


AST   33 


 


Alkaline Phosphatase   113 


 


Total Protein   7.5 


 


Albumin   3.7 


 


Urine Color  YELLOW  


 


Urine Appearance  CLEAR  


 


Urine pH  6.0  


 


Ur Specific Gravity  1.020  


 


Urine Protein  100 H  


 


Urine Glucose (UA)  >=500 H  


 


Urine Ketones  20 H  


 


Urine Blood  NEGATIVE  


 


Urine Nitrite  NEGATIVE  


 


Ur Leukocyte Esterase  MODERATE H  


 


Urine WBC (Auto)  7  


 


Urine RBC (Auto)  3  








                                        





08/10/20 14:20   Clean Catch Midstream   Urine Culture - Final


                            Enterococcus Faecalis(Group D)


                            Urogenital Brisa





                                        











  08/10/20 08/10/20 08/10/20





  13:15 13:41 17:36


 


Creatine Kinase  73  


 


Troponin I   0.081  0.081














  08/10/20 08/11/20





  23:25 05:25


 


Creatine Kinase  


 


Troponin I  0.064  0.060











EKG Comments: 





Review of telemetry shows 3.8-second pause postconversion to sinus rhythm.  

However patient later on developed atrial fibrillation again


We recommend continued measures for rate control especially given ongoing 

urinary tract infection


Would not pursue cardioversion during this admission


Continue systemic anticoagulation with apixaban without interruption


If the heart rate can be controlled in the low 100s I suspect she can be disc

harged in the next 24 hours.  Plan then would be to pursue outpatient 

cardioversion once the infection has subsided.


Impressions: 


                                        





Chest X-Ray  08/10/20 12:28


IMPRESSION:  1.  Borderline cardiomegaly.  No evidence for failure.


2.  No acute pulmonary findings.


 














Assessment & Plan





- Diagnosis


(1) Atrial fibrillation with RVR


Is this a current diagnosis for this admission?: Yes   





(2) Urinary tract infection


Qualifiers: 


   Urinary tract infection type: site unspecified   Hematuria presence: with he

maturia   Qualified Code(s): N39.0 - Urinary tract infection, site not spec

ified; R31.9 - Hematuria, unspecified   


Is this a current diagnosis for this admission?: Yes   


Plan: 


Continue antibiotic therapy.  Clinically improved.

## 2020-08-14 VITALS — SYSTOLIC BLOOD PRESSURE: 132 MMHG | DIASTOLIC BLOOD PRESSURE: 53 MMHG

## 2020-08-14 LAB
ERYTHROCYTE [DISTWIDTH] IN BLOOD BY AUTOMATED COUNT: 15.3 % (ref 11.5–14)
HCT VFR BLD CALC: 27.3 % (ref 36–47)
HGB BLD-MCNC: 9.3 G/DL (ref 12–15.5)
MCH RBC QN AUTO: 28.2 PG (ref 27–33.4)
MCHC RBC AUTO-ENTMCNC: 34.3 G/DL (ref 32–36)
MCV RBC AUTO: 82 FL (ref 80–97)
PLATELET # BLD: 190 10^3/UL (ref 150–450)
RBC # BLD AUTO: 3.31 10^6/UL (ref 3.72–5.28)
WBC # BLD AUTO: 5.4 10^3/UL (ref 4–10.5)

## 2020-08-14 RX ADMIN — PANTOPRAZOLE SODIUM SCH MG: 40 TABLET, DELAYED RELEASE ORAL at 05:51

## 2020-08-14 RX ADMIN — CITALOPRAM HYDROBROMIDE SCH MG: 20 TABLET ORAL at 10:16

## 2020-08-14 RX ADMIN — INSULIN HUMAN SCH UNIT: 100 INJECTION, SUSPENSION SUBCUTANEOUS at 10:17

## 2020-08-14 RX ADMIN — Medication SCH TAB: at 10:13

## 2020-08-14 RX ADMIN — APIXABAN SCH MG: 5 TABLET, FILM COATED ORAL at 10:14

## 2020-08-14 RX ADMIN — MAGNESIUM OXIDE TAB 400 MG (241.3 MG ELEMENTAL MG) SCH MG: 400 (241.3 MG) TAB at 10:14

## 2020-08-14 RX ADMIN — LEVOFLOXACIN SCH MG: 500 TABLET, FILM COATED ORAL at 10:13

## 2020-08-14 RX ADMIN — LEVOTHYROXINE SODIUM SCH MG: 75 TABLET ORAL at 05:50

## 2020-08-14 RX ADMIN — INSULIN LISPRO SCH: 100 INJECTION, SOLUTION INTRAVENOUS; SUBCUTANEOUS at 10:11

## 2020-08-14 RX ADMIN — FOLIC ACID SCH MG: 1 TABLET ORAL at 10:15

## 2020-08-14 RX ADMIN — METOPROLOL TARTRATE SCH MG: 25 TABLET, FILM COATED ORAL at 10:14

## 2020-08-14 RX ADMIN — CETIRIZINE HYDROCHLORIDE SCH MG: 10 TABLET, FILM COATED ORAL at 10:14

## 2020-08-14 RX ADMIN — Medication SCH ML: at 05:51

## 2020-08-14 NOTE — PDOC DISCHARGE SUMMARY
Impression





- Admit/DC Date/PCP


Admission Date/Primary Care Provider: 


  08/10/20 17:41





  HALLEY HOLT MD





Discharge Date: 08/14/20





- Discharge Diagnosis


(1) Atrial fibrillation with RVR


Is this a current diagnosis for this admission?: Yes   





(2) Urinary tract infection


Is this a current diagnosis for this admission?: Yes   





(3) Hyponatremia


Is this a current diagnosis for this admission?: Yes   





(4) Coronary artery disease


Is this a current diagnosis for this admission?: Yes   





(5) Hypertension


Is this a current diagnosis for this admission?: No   





(6) Hyperglycemia due to diabetes mellitus


Is this a current diagnosis for this admission?: Yes   





(7) Hypomagnesemia


Is this a current diagnosis for this admission?: Yes   





(8) COPD (chronic obstructive pulmonary disease) with chronic bronchitis


Is this a current diagnosis for this admission?: Yes   





(9) Hypotension


Is this a current diagnosis for this admission?: Yes   





(10) Depression


Is this a current diagnosis for this admission?: No   





- Assessment


Summary: 


(1) Atrial fibrillation with RVR


Is this a current diagnosis for this admission?: Yes   


Plan: 


The patient is normally on bisoprolol 10 mg daily.  I will use small doses of 

metoprolol tartrate initially because of her low blood pressure.  PRN 

intravenous metoprolol is available as well.  She is already on apixaban.





8/11/2020-patient is on apixaban came in with A. fib with RVR patient recently 

had a cardioversion/JENNIFER Dr. Adams is going to evaluate the patient for further

management.  Presently patient is on metoprolol 25 mg p.o. every 12 hours and 

metoprolol 2.5 mg IV every 6 hours as needed.





8/12/2020-patient admitted with atrial fib with rapid ventricular rate on 

Eliquis.  Patient is going for cardioversion today.





8/13/2020-patient is back in A. fib with RVR heart rate is around 124.  Plan is 

to continue metoprolol 2.5 mg IV every 6 as needed, dose of metoprolol is 

increased to 50 mg p.o. every 12 hours.  Dr. rubio is notified.  Plan is to 

continue Eliquis at this time.





8/14/2020-patient admitted with A. fib with RVR cardioversion was done yesterday

twice.  Patient converted to sinus rhythm last night going home on metoprolol 25

mg p.o. twice daily and Eliquis.  Dr. Rubio thinks trigger factor for A. fib with

RVR at this time is the urinary tract infection and the urine cultures came back

positive for Enterococcus faecalis discharged home on levofloxacin.








(2) Urinary tract infection


Qualifiers: 


   Urinary tract infection type: site unspecified   Hematuria presence: with 

hematuria   Qualified Code(s): N39.0 - Urinary tract infection, site not 

specified; R31.9 - Hematuria, unspecified   


Is this a current diagnosis for this admission?: Yes   


Plan: 


Urine culture is pending.  The patient will be on intravenous ceftriaxone until 

identification and sensitivities are available.





8/11/20-urine culture came back positive for gram-positive cocci in chains.  

Plan is to continue IV ceftriaxone at this time.





8/12/2020-urine culture came back positive for E faecalis.  Presently on 

Rocephin IV plan is to switch medication to levofloxacin 500 mg p.o. daily.





8/13/2020-urine culture is positive for Enterococcus faecalis on levofloxacin 

500 mg daily.





8/14/2020-prescription was given for levofloxacin 5 mg p.o. daily for 5 days.








(3) Hyponatremia


Is this a current diagnosis for this admission?: Yes   


Plan: 


Sodium levels should begin to correct with normal saline.  We will monitor 

closely.  Once fully hydrated the patient might benefit from chronic fluid 

restriction.





8/11/20-serum sodium came up to 127.7.  Patient is on normal saline at 125 cc/h 

with potassium supplementations plan is to decrease the rate to 75 cc/h.





8/12/2020-serum sodium is improving it is 129.8 today.  Patient is on normal 

saline at 75 cc/h.  Plan is to decrease the fluids to 50 cc/h from today.  

Because of hyponatremia may be secondary to medications.





8/13/2020-serum sodium is 129.8.  Stable.  Presently on IV fluids 50 cc/h.  Plan

is to continue to for the labs on regular basis.





8/14/2020-serum sodium is stable.  Patient is asymptomatic.  Advised to continue

the present management.








(4) Coronary artery disease


Qualifiers: 


   Coronary Disease-Associated Artery/Lesion type: native artery   Native vs. 

transplanted heart: native heart   Associated angina: without angina   Qualified

Code(s): I25.10 - Atherosclerotic heart disease of native coronary artery 

without angina pectoris   


Is this a current diagnosis for this admission?: Yes   


Plan: 


We will continue her aspirin and statin therapy.  We will add back her 

antihypertensive medications based on her blood pressure.








(5) Hypertension


Qualifiers: 


   Hypertension type: essential hypertension   Qualified Code(s): I10 - 

Essential (primary) hypertension   


Is this a current diagnosis for this admission?: No   


Plan: 


Usually on multiple medications.  Blood pressure was low on admission.  We will 

start with low-dose metoprolol.  As blood pressure improves gradually add back 

antihypertensive medications.





8/11/2020-patient blood pressure today is 115/75.  Stable.  Plan is to continue 

metoprolol 25 mg p.o. twice daily at this time.





8/12/2020-blood pressure today is 122/60.  Stable.  Plan is to continue 

metoprolol 25 mg p.o. twice a day.





8/13/2020-blood pressure is 140/60.  Stable.  Metoprolol dose is increased to 50

mg p.o. twice daily and plan is to stop IV fluids from today.








(6) Hyperglycemia due to diabetes mellitus


Is this a current diagnosis for this admission?: Yes   


Plan: 


Controlled carbohydrate diet.  Accu-Cheks and sliding scale.  Continue home NPH 

regimen of 25 units in the morning and 20 units in the evening.





8/11/2020-patient blood sugar is 82 this morning.  Plan is to decrease lantus to

15 units before meals and 15 units nightly.  Check for hemoglobin A1c.  Diet 

exercise complicated medications discussed with the patient.





8/12/2020-blood sugar this morning is 97.  Patient is receiving Lantus 15 units 

before meals and 15 units at night.  Hemoglobin A1c is 7.1.  Diet exercise 

weight loss lifestyle modifications discussed with the patient again.





8/13/2020-latest blood sugar is 142.  Plan is to continue Lantus 15 units before

meals and 15 units at bedtime.





8/14/2020-patient blood sugar this morning is 197.  Patient is advised to resume

her home medications at this time.








(7) Hypomagnesemia


Is this a current diagnosis for this admission?: Yes   


Plan: 


Supplement magnesium and monitor





8/11/2020-serum magnesium today is 2.0.  Stable.





1320-serum magnesium is 1.6 to start her on magnesium oxide 400 mg p.o. twice 

daily.








(8) COPD (chronic obstructive pulmonary disease) with chronic bronchitis


Is this a current diagnosis for this admission?: Yes   


Plan: 


Asymptomatic at this time.  Only uses rescue inhaler at home.








(9) Hypotension


Qualifiers: 


   Hypotension type: hypotension due to hypovolemia   Qualified Code(s): I95.89 

- Other hypotension; E86.1 - Hypovolemia   


Is this a current diagnosis for this admission?: Yes   


Plan: 


Resolved with IV fluids.  Gradually resume antihypertensive medications as blood

pressure increases.





8/11/2020-blood pressure today is 115/75.  Stable.  Plan is to decrease the IV 

fluids to 75 cc/h.





8/12/2020-hypotension is resolving.  Latest blood pressure is 122/60.  Plan is 

to decrease the IV fluids to 50 cc/h from today.








(10) Depression


Qualifiers: 


   Depression Type: unspecified   Qualified Code(s): F32.9 - Major depressive 

disorder, single episode, unspecified   


Is this a current diagnosis for this admission?: No   


Plan: 


Continue Celexa








- Additional Information


Resuscitation Status: Full Code


Discharge Diet: Diabetic


Discharge Activity: Activity As Tolerated


Referrals: 


HALLEY HOLT MD [Primary Care Provider] - 08/24/20 1:15 pm


ZOE MONTANA MD [ACTIVE STAFF] - 08/25/20 9:30 am


Prescriptions: 


Apixaban [Eliquis 5 mg Tablet] 5 mg PO BID 30 Days #60 tablet


Levofloxacin [Levaquin 500 mg Tablet] 500 mg PO DAILY 5 Days #5 tablet


Metoprolol Tartrate [Lopressor 25 mg Tablet] 25 mg PO Q12 30 Days #60 tablet


Home Medications: 








Atorvastatin Calcium [Lipitor 20 mg Tablet] 20 mg PO QHS 07/27/20 


Citalopram Hydrobromide [Celexa] 10 mg PO DAILY 07/27/20 


Folic Acid [Folvite 1 mg Tablet] 1 mg PO DAILY 07/27/20 


Insulin Lispro [Humalog Kwikpen U-100] 15 unit SQ TID 07/27/20 


Insulin NPH Human Isophane [Novolin N Flexpen] 20 unit SQ QPM 07/27/20 


Insulin NPH Human Isophane [Novolin N Flexpen] 25 unit SQ QAM 07/27/20 


Levothyroxine Sodium 88 mcg PO SUSA@0600 07/27/20 


Aspirin [Aspir-Low] 81 mg PO DAILY 07/28/20 


Calcium Carbonate/Vitamin D3 [Calcium 600-D3 20Mcg(800 Unit)] 1 tab PO QHS 

07/28/20 


Cetirizine HCl [Zyrtec] 10 mg PO HSP PRN 07/28/20 


Levothyroxine Sodium [Levo-T] 75 mcg PO MOTUWETHFR@1000 07/28/20 


Multivit with Calcium,Iron,Min [Multiple Vitamins For Women] 1 each PO DAILY 

07/28/20 


Acetaminophen [Tylenol 325 mg Tablet] 650 mg PO Q4HP PRN  tablet 07/30/20 


Albuterol Sulfate [Proair HFA Inhalation Aerosol 8.5 gm MDI] 2 puff IH Q4HP PRN 

#1 hfa.aer.ad 07/30/20 


Pantoprazole Sodium [Protonix 40 mg Dr Tablet] 40 mg PO QAM #30 tablet.dr 

07/30/20 


Apixaban [Eliquis 5 mg Tablet] 5 mg PO BID  tablet 08/14/20 


Apixaban [Eliquis 5 mg Tablet] 5 mg PO BID 30 Days #60 tablet 08/14/20 


Apixaban [Eliquis 5 mg Tablet] 5 mg PO BID 60 Days #30 tablet 08/14/20 


Atorvastatin Calcium [Lipitor 40 mg Tablet] 40 mg PO QHS  tablet 08/14/20 


Cetirizine HCl [Zyrtec 10 mg Tablet] 10 mg PO DAILY  tablet 08/14/20 


Citalopram Hydrobromide [Celexa 20 mg Tablet] 10 mg PO DAILY  tablet 08/14/20 


Folic Acid [Folvite 1 mg Tablet] 1 mg PO DAILY  tablet 08/14/20 


Levofloxacin [Levaquin 500 mg Tablet] 500 mg PO DAILY 5 Days #5 tablet 08/14/20 


Levothyroxine Sodium [Synthroid 0.075 mg Tablet] 0.075 mg PO MOTUWETHFR  tablet 

08/14/20 


Metoprolol Tartrate [Lopressor 25 mg Tablet] 25 mg PO Q12 30 Days #60 tablet 

08/14/20 











History of Present Illiness


History of Present Illness: 


NADEEN BARRIOS is a 64 year old female


 64 year old female well-known to me from her recent admission.  She has a 

history of coronary artery disease and was recently cardioverted after JENN[facility]ER exam 

last week.  She states that she felt good when she got out of the hospital.  On 

Sunday she began to feel tired and she felt "off ".  When she woke up today she 

states that she still felt quite tired but thought if she took a shower she 

would feel better.  She was in the shower when she became lightheaded and 

"fell".  She states she fell against the door which opened thankfully and did 

not break.  She had several sore areas but no significant injuries.  She was 

going to her primary care provider's office and was instructed to go to the 

hospital.  In the emergency department she was found to be hypotensive, back in 

atrial fibrillation with rapid ventricular response with a sodium of 121, 

potassium 3.5, magnesium 1.4 and a glucose of 232.  Her urinalysis was markedly 

positive suggesting infection.  The patient will receive IV fluids and 

antibiotics.  We will correct her electrolytes.  Her troponin was 0.08 and this 

could be due to the rapid ventricular response however I will obtain serial 

troponin studies.  The patient will be admitted to [facility].





Hospital Course


Hospital Course: 


64 year old female well-known to me from her recent admission.  She has a 

history of coronary artery disease and was recently cardioverted after JENNIFER exam 

last week.  She states that she felt good when she got out of the hospital.  On 

Sunday she began to feel tired and she felt "off ".  When she woke up today she 

states that she still felt quite tired but thought if she took a shower she 

would feel better.  She was in the shower when she became lightheaded and 

"fell".  She states she fell against the door which opened thankfully and did 

not break.  She had several sore areas but no significant injuries.  She was 

going to her primary care provider's office and was instructed to go to the 

hospital.  In the emergency department she was found to be hypotensive, back in 

atrial fibrillation with rapid ventricular response with a sodium of 121, p

otassium 3.5, magnesium 1.4 and a glucose of 232.  Her urinalysis was markedly 

positive suggesting infection.  The patient will receive IV fluids and 

antibiotics.  We will correct her electrolytes.  Her troponin was 0.08 and this 

could be due to the rapid ventricular response however I will obtain serial 

troponin studies.  The patient will be admitted to [facility].








8/11/20-patient is comfortable in the bed communicating well.  Still 

tachycardic.  Serum sodium came up to 127.7.  Patient is receiving IV Rocephin 

for UTI.  Discussed with Dr. Rubio is going to come and see the patient.





8/12/[id number]-cici-old female admitted with atrial for with rapid ventricular 

rate.  Patient has a recent cardioversion at that time found to have a normal 

ejection fraction no right atrial appendage thrombus seen.  Patient is scheduled

for cardioversion again this afternoon.  Plan is to continue Eliquis at this 

time and the patient is n.p.o. from midnight.





8/13/20-patient has cardioversion yesterday twice, patient is back in A. fib 

with heart rate is around 124 this morning.  I spoke to Dr. Rubio his 

recommendation is to adjust the beta-blockers at this time.  Plan is to continue

Eliquis at this time.





8/14/2020-patient is converted to sinus rhythm heart rate is 59 this morning.  

EKG shows first-degree heart block.  Dr. Rubio discussed the care with me his 

recommendation is to discharge the patient today and is going to follow-up with 

the patient in his office.  He also requested me to continue metoprolol and 

Eliquis at this time.





Physical Exam


Vital Signs: 


                                        











Temp Pulse Resp BP Pulse Ox


 


 98.4 F   58 L  16   132/53 H  99 


 


 08/14/20 11:28  08/14/20 11:28  08/14/20 11:28  08/14/20 11:28  08/14/20 11:28








                                 Intake & Output











 08/13/20 08/14/20 08/15/20





 06:59 06:59 06:59


 


Intake Total 1860 2075 


 


Balance 1860 2075 


 


Weight 74.9 kg 75.3 kg 











General appearance: PRESENT: no acute distress, obese


Head exam: PRESENT: normocephalic


Eye exam: PRESENT: PERRLA


Ear exam: PRESENT: normal external ear exam


Mouth exam: PRESENT: neck supple


Teeth exam: PRESENT: poor dentation


Neck exam: ABSENT: carotid bruit, JVD, lymphadenopathy, thyromegaly


Respiratory exam: PRESENT: decreased breath sounds


Cardiovascular exam: PRESENT: RRR.  ABSENT: diastolic murmur, rubs, systolic 

murmur


GI/Abdominal exam: PRESENT: normal bowel sounds, soft.  ABSENT: distended, 

guarding, mass, organolmegaly, rebound, tenderness


Rectal exam: PRESENT: deferred


Extremities exam: PRESENT: full ROM.  ABSENT: calf tenderness, clubbing, pedal 

edema


Neurological exam: PRESENT: alert, awake, oriented to person, oriented to place,

oriented to time, oriented to situation, CN II-XII grossly intact.  ABSENT: 

motor sensory deficit


Psychiatric exam: PRESENT: appropriate affect, normal mood.  ABSENT: homicidal 

ideation, suicidal ideation





Results


Laboratory Results: 


                                        











WBC  5.4 10^3/uL (4.0-10.5)   08/14/20  05:10    


 


RBC  3.31 10^6/uL (3.72-5.28)  L  08/14/20  05:10    


 


Hgb  9.3 g/dL (12.0-15.5)  L  08/14/20  05:10    


 


Hct  27.3 % (36.0-47.0)  L  08/14/20  05:10    


 


MCV  82 fl (80-97)   08/14/20  05:10    


 


MCH  28.2 pg (27.0-33.4)   08/14/20  05:10    


 


MCHC  34.3 g/dL (32.0-36.0)   08/14/20  05:10    


 


RDW  15.3 % (11.5-14.0)  H  08/14/20  05:10    


 


Plt Count  190 10^3/uL (150-450)   08/14/20  05:10    


 


Lymph % (Auto)  18.9 % (13-45)   08/13/20  06:39    


 


Mono % (Auto)  6.4 % (3-13)   08/13/20  06:39    


 


Eos % (Auto)  5.5 % (0-6)   08/13/20  06:39    


 


Baso % (Auto)  1.0 % (0-2)   08/13/20  06:39    


 


Absolute Neuts (auto)  3.7 10^3/uL (1.7-8.2)   08/13/20  06:39    


 


Absolute Lymphs (auto)  1.0 10^3/uL (0.5-4.7)   08/13/20  06:39    


 


Absolute Monos (auto)  0.3 10^3/uL (0.1-1.4)   08/13/20  06:39    


 


Absolute Eos (auto)  0.3 10^3/uL (0.0-0.6)   08/13/20  06:39    


 


Absolute Basos (auto)  0.1 10^3/uL (0.0-0.2)   08/13/20  06:39    


 


Seg Neutrophils %  68.2 % (42-78)   08/13/20  06:39    


 


VBG pH  7.44  (7.30-7.42)  H  08/10/20  13:41    


 


VBG pCO2  34.6 mmHg (35-63)  L  08/10/20  13:41    


 


VBG HCO3  23.1 mmol/L (20-32)   08/10/20  13:41    


 


VBG Base Excess  -0.5 mmol/L  08/10/20  13:41    


 


Sodium  129.8 mmol/L (137-145)  L  08/13/20  06:39    


 


Potassium  4.7 mmol/L (3.6-5.0)   08/13/20  06:39    


 


Chloride  98 mmol/L ()   08/13/20  06:39    


 


Carbon Dioxide  24 mmol/L (22-30)   08/13/20  06:39    


 


Anion Gap  8  (5-19)   08/13/20  06:39    


 


BUN  12 mg/dL (7-20)   08/13/20  06:39    


 


Creatinine  0.77 mg/dL (0.52-1.25)   08/13/20  06:39    


 


Est GFR ( Amer)  > 60  (>60)   08/13/20  06:39    


 


Est GFR (MDRD) Non-Af  > 60  (>60)   08/13/20  06:39    


 


Glucose  142 mg/dL ()  H  08/13/20  06:39    


 


POC Glucose  136 mg/dL ()  H  08/14/20  07:45    


 


Hemoglobin A1c %  7.1 % (4.7-6.0)  H  08/11/20  05:25    


 


Calcium  8.5 mg/dL (8.4-10.2)   08/13/20  06:39    


 


Phosphorus  2.4 mg/dL (2.5-4.5)  L  08/13/20  06:39    


 


Magnesium  1.6 mg/dL (1.6-2.3)   08/13/20  06:39    


 


Total Bilirubin  0.5 mg/dL (0.2-1.3)   08/13/20  06:39    


 


Direct Bilirubin  0.1 mg/dL (0.0-0.4)   08/13/20  06:39    


 


Neonat Total Bilirubin  Not Reportable   08/13/20  06:39    


 


Neonat Direct Bilirubin  Not Reportable   08/13/20  06:39    


 


Neonat Indirect Bili  Not Reportable   08/13/20  06:39    


 


AST  33 U/L (14-36)   08/13/20  06:39    


 


ALT  43 U/L (<35)  H  08/13/20  06:39    


 


Alkaline Phosphatase  113 U/L ()   08/13/20  06:39    


 


Creatine Kinase  73 U/L ()   08/10/20  13:15    


 


Troponin I  0.060 ng/mL  08/11/20  05:25    


 


Total Protein  7.5 g/dL (6.3-8.2)   08/13/20  06:39    


 


Albumin  3.7 g/dL (3.5-5.0)   08/13/20  06:39    


 


Urine Color  YELLOW   08/12/20  22:09    


 


Urine Appearance  CLEAR   08/12/20  22:09    


 


Urine pH  6.0  (5.0-9.0)   08/12/20  22:09    


 


Ur Specific Gravity  1.020   08/12/20  22:09    


 


Urine Protein  100 mg/dL (NEGATIVE)  H  08/12/20  22:09    


 


Urine Glucose (UA)  >=500 mg/dL (NEGATIVE)  H  08/12/20  22:09    


 


Urine Ketones  20 mg/dL (NEGATIVE)  H  08/12/20  22:09    


 


Urine Blood  NEGATIVE  (NEGATIVE)   08/12/20  22:09    


 


Urine Nitrite  NEGATIVE  (NEGATIVE)   08/12/20  22:09    


 


Urine Bilirubin  NEGATIVE  (NEGATIVE)   08/12/20  22:09    


 


Urine Urobilinogen  NEGATIVE mg/dL (<2.0)   08/12/20  22:09    


 


Ur Leukocyte Esterase  MODERATE  (NEGATIVE)  H  08/12/20  22:09    


 


Urine WBC (Auto)  7 /HPF  08/12/20  22:09    


 


Urine RBC (Auto)  3 /HPF  08/12/20  22:09    


 


U Hyaline Cast (Auto)  84 /LPF  08/10/20  14:20    


 


Urine Bacteria (Auto)  1+ /HPF  08/10/20  14:20    


 


Squamous Epi Cells Auto  <1 /HPF  08/12/20  22:09    


 


U Non-Squamous Epis Auto  4 /HPF  08/10/20  14:20    


 


Urine Mucus (Auto)  RARE /LPF  08/12/20  22:09    


 


Urine Ascorbic Acid  40  (NEGATIVE)  H  08/12/20  22:09    








                                        











  08/10/20 08/10/20 08/10/20





  13:41 17:36 23:25


 


Troponin I  0.081  0.081  0.064














  08/11/20





  05:25


 


Troponin I  0.060











Impressions: 


                                        





Chest X-Ray  08/10/20 12:28


IMPRESSION:  1.  Borderline cardiomegaly.  No evidence for failure.


2.  No acute pulmonary findings.


 














Plan


Time Spent: Greater than 30 Minutes





Stroke


Is this a Stroke Patient?: No





Acute Heart Failure





- **


Is this a Heart Failure Patient?: No

## 2020-08-14 NOTE — EKG REPORT
SEVERITY:- ABNORMAL ECG -

SINUS RHYTHM

VENTRICULAR PREMATURE COMPLEX

LOW VOLTAGE IN FRONTAL LEADS

CONSIDER LEFT VENTRICULAR HYPERTROPHY

:

Confirmed by: Ramiro Ta MD 14-Aug-2020 16:39:52